# Patient Record
Sex: MALE | Race: BLACK OR AFRICAN AMERICAN | Employment: UNEMPLOYED | URBAN - METROPOLITAN AREA
[De-identification: names, ages, dates, MRNs, and addresses within clinical notes are randomized per-mention and may not be internally consistent; named-entity substitution may affect disease eponyms.]

---

## 2017-01-02 PROBLEM — E11.9 TYPE 2 DIABETES MELLITUS WITHOUT COMPLICATION, WITHOUT LONG-TERM CURRENT USE OF INSULIN (HCC): Status: ACTIVE | Noted: 2017-01-02

## 2017-06-19 ENCOUNTER — HOSPITAL ENCOUNTER (INPATIENT)
Age: 47
LOS: 2 days | Discharge: HOME | DRG: 247 | End: 2017-06-21
Attending: INTERNAL MEDICINE | Admitting: INTERNAL MEDICINE
Payer: MEDICARE

## 2017-06-19 DIAGNOSIS — E78.5 HYPERLIPIDEMIA LDL GOAL <70: ICD-10-CM

## 2017-06-19 DIAGNOSIS — I10 BENIGN ESSENTIAL HYPERTENSION: ICD-10-CM

## 2017-06-19 PROBLEM — I21.3 STEMI (ST ELEVATION MYOCARDIAL INFARCTION) (HCC): Status: ACTIVE | Noted: 2017-06-19

## 2017-06-19 LAB
ABO + RH BLD: NORMAL
ALBUMIN SERPL BCP-MCNC: 3.7 G/DL (ref 3.5–5)
ALBUMIN/GLOB SERPL: 1 {RATIO} (ref 1.2–3.5)
ALP SERPL-CCNC: 103 U/L (ref 50–136)
ALT SERPL-CCNC: 20 U/L (ref 12–65)
ANION GAP BLD CALC-SCNC: 11 MMOL/L (ref 7–16)
AST SERPL W P-5'-P-CCNC: 23 U/L (ref 15–37)
BACTERIA SPEC CULT: ABNORMAL
BACTERIA SPEC CULT: ABNORMAL
BASOPHILS # BLD AUTO: 0 K/UL (ref 0–0.2)
BASOPHILS # BLD AUTO: 0 K/UL (ref 0–0.2)
BASOPHILS # BLD: 0 % (ref 0–2)
BASOPHILS # BLD: 0 % (ref 0–2)
BILIRUB SERPL-MCNC: 0.6 MG/DL (ref 0.2–1.1)
BLOOD GROUP ANTIBODIES SERPL: NORMAL
BUN SERPL-MCNC: 16 MG/DL (ref 6–23)
CALCIUM SERPL-MCNC: 8.3 MG/DL (ref 8.3–10.4)
CHLORIDE SERPL-SCNC: 111 MMOL/L (ref 98–107)
CO2 SERPL-SCNC: 22 MMOL/L (ref 21–32)
COLLECTION COMMENT, COLCM: NORMAL
CREAT SERPL-MCNC: 1.12 MG/DL (ref 0.8–1.5)
DIFFERENTIAL METHOD BLD: ABNORMAL
DIFFERENTIAL METHOD BLD: ABNORMAL
EOSINOPHIL # BLD: 0 K/UL (ref 0–0.8)
EOSINOPHIL # BLD: 0 K/UL (ref 0–0.8)
EOSINOPHIL NFR BLD: 0 % (ref 0.5–7.8)
EOSINOPHIL NFR BLD: 1 % (ref 0.5–7.8)
ERYTHROCYTE [DISTWIDTH] IN BLOOD BY AUTOMATED COUNT: 14 % (ref 11.9–14.6)
ERYTHROCYTE [DISTWIDTH] IN BLOOD BY AUTOMATED COUNT: 14.1 % (ref 11.9–14.6)
ERYTHROCYTE [DISTWIDTH] IN BLOOD BY AUTOMATED COUNT: 14.1 % (ref 11.9–14.6)
GLOBULIN SER CALC-MCNC: 3.8 G/DL (ref 2.3–3.5)
GLUCOSE BLD STRIP.AUTO-MCNC: 102 MG/DL (ref 65–100)
GLUCOSE BLD STRIP.AUTO-MCNC: 111 MG/DL (ref 65–100)
GLUCOSE SERPL-MCNC: 92 MG/DL (ref 65–100)
HCT VFR BLD AUTO: 48.4 % (ref 41.1–50.3)
HCT VFR BLD AUTO: 48.7 % (ref 41.1–50.3)
HCT VFR BLD AUTO: 49.2 % (ref 41.1–50.3)
HGB BLD-MCNC: 16.6 G/DL (ref 13.6–17.2)
HGB BLD-MCNC: 16.8 G/DL (ref 13.6–17.2)
HGB BLD-MCNC: 17.1 G/DL (ref 13.6–17.2)
IMM GRANULOCYTES # BLD: 0 K/UL (ref 0–0.5)
IMM GRANULOCYTES NFR BLD AUTO: 0 % (ref 0–5)
IMM GRANULOCYTES NFR BLD AUTO: 0 % (ref 0–5)
LYMPHOCYTES # BLD AUTO: 10 % (ref 13–44)
LYMPHOCYTES # BLD AUTO: 11 % (ref 13–44)
LYMPHOCYTES # BLD: 1.3 K/UL (ref 0.5–4.6)
LYMPHOCYTES # BLD: 1.3 K/UL (ref 0.5–4.6)
MCH RBC QN AUTO: 30 PG (ref 26.1–32.9)
MCH RBC QN AUTO: 30 PG (ref 26.1–32.9)
MCH RBC QN AUTO: 30.1 PG (ref 26.1–32.9)
MCHC RBC AUTO-ENTMCNC: 34.3 G/DL (ref 31.4–35)
MCHC RBC AUTO-ENTMCNC: 34.5 G/DL (ref 31.4–35)
MCHC RBC AUTO-ENTMCNC: 34.8 G/DL (ref 31.4–35)
MCV RBC AUTO: 86.6 FL (ref 79.6–97.8)
MCV RBC AUTO: 87 FL (ref 79.6–97.8)
MCV RBC AUTO: 87.5 FL (ref 79.6–97.8)
MONOCYTES # BLD: 0.5 K/UL (ref 0.1–1.3)
MONOCYTES # BLD: 0.6 K/UL (ref 0.1–1.3)
MONOCYTES NFR BLD AUTO: 4 % (ref 4–12)
MONOCYTES NFR BLD AUTO: 5 % (ref 4–12)
NEUTS SEG # BLD: 10.2 K/UL (ref 1.7–8.2)
NEUTS SEG # BLD: 10.6 K/UL (ref 1.7–8.2)
NEUTS SEG NFR BLD AUTO: 84 % (ref 43–78)
NEUTS SEG NFR BLD AUTO: 85 % (ref 43–78)
PLATELET # BLD AUTO: 1 K/UL (ref 150–450)
PLATELET # BLD AUTO: 1 K/UL (ref 150–450)
PLATELET # BLD AUTO: 5 K/UL (ref 150–450)
PLATELET COMMENTS,PCOM: ABNORMAL
PLATELET COMMENTS,PCOM: ABNORMAL
PMV BLD AUTO: ABNORMAL FL (ref 10.8–14.1)
POTASSIUM SERPL-SCNC: 3.7 MMOL/L (ref 3.5–5.1)
PROT SERPL-MCNC: 7.5 G/DL (ref 6.3–8.2)
RBC # BLD AUTO: 5.53 M/UL (ref 4.23–5.67)
RBC # BLD AUTO: 5.6 M/UL (ref 4.23–5.67)
RBC # BLD AUTO: 5.68 M/UL (ref 4.23–5.67)
RBC MORPH BLD: ABNORMAL
RBC MORPH BLD: ABNORMAL
SERVICE CMNT-IMP: ABNORMAL
SODIUM SERPL-SCNC: 144 MMOL/L (ref 136–145)
SPECIMEN EXP DATE BLD: NORMAL
TROPONIN I SERPL-MCNC: 3.15 NG/ML (ref 0.02–0.05)
TROPONIN I SERPL-MCNC: 5.82 NG/ML (ref 0.02–0.05)
WBC # BLD AUTO: 12.1 K/UL (ref 4.3–11.1)
WBC # BLD AUTO: 12.4 K/UL (ref 4.3–11.1)
WBC # BLD AUTO: 12.5 K/UL (ref 4.3–11.1)
WBC MORPH BLD: ABNORMAL
WBC MORPH BLD: ABNORMAL

## 2017-06-19 PROCEDURE — 74011250637 HC RX REV CODE- 250/637: Performed by: INTERNAL MEDICINE

## 2017-06-19 PROCEDURE — 99152 MOD SED SAME PHYS/QHP 5/>YRS: CPT

## 2017-06-19 PROCEDURE — 74011250636 HC RX REV CODE- 250/636: Performed by: INTERNAL MEDICINE

## 2017-06-19 PROCEDURE — 74011250637 HC RX REV CODE- 250/637: Performed by: NURSE PRACTITIONER

## 2017-06-19 PROCEDURE — C1725 CATH, TRANSLUMIN NON-LASER: HCPCS

## 2017-06-19 PROCEDURE — 77030013131 HC IV BLD ST ICUM -A

## 2017-06-19 PROCEDURE — 77030019605

## 2017-06-19 PROCEDURE — 30233R1 TRANSFUSION OF NONAUTOLOGOUS PLATELETS INTO PERIPHERAL VEIN, PERCUTANEOUS APPROACH: ICD-10-PCS | Performed by: INTERNAL MEDICINE

## 2017-06-19 PROCEDURE — C1887 CATHETER, GUIDING: HCPCS

## 2017-06-19 PROCEDURE — B2111ZZ FLUOROSCOPY OF MULTIPLE CORONARY ARTERIES USING LOW OSMOLAR CONTRAST: ICD-10-PCS | Performed by: INTERNAL MEDICINE

## 2017-06-19 PROCEDURE — 77030004558 HC CATH ANGI DX SUPR TORQ CARD -A

## 2017-06-19 PROCEDURE — C1876 STENT, NON-COA/NON-COV W/DEL: HCPCS

## 2017-06-19 PROCEDURE — 36430 TRANSFUSION BLD/BLD COMPNT: CPT

## 2017-06-19 PROCEDURE — 99153 MOD SED SAME PHYS/QHP EA: CPT

## 2017-06-19 PROCEDURE — 77030012468 HC VLV BLEEDBK CNTRL ABBT -B

## 2017-06-19 PROCEDURE — 74011000258 HC RX REV CODE- 258: Performed by: INTERNAL MEDICINE

## 2017-06-19 PROCEDURE — 74011250636 HC RX REV CODE- 250/636

## 2017-06-19 PROCEDURE — 74011000250 HC RX REV CODE- 250: Performed by: INTERNAL MEDICINE

## 2017-06-19 PROCEDURE — 87641 MR-STAPH DNA AMP PROBE: CPT | Performed by: INTERNAL MEDICINE

## 2017-06-19 PROCEDURE — C1769 GUIDE WIRE: HCPCS

## 2017-06-19 PROCEDURE — 75810000275 HC EMERGENCY DEPT VISIT NO LEVEL OF CARE: Performed by: EMERGENCY MEDICINE

## 2017-06-19 PROCEDURE — 84484 ASSAY OF TROPONIN QUANT: CPT | Performed by: NURSE PRACTITIONER

## 2017-06-19 PROCEDURE — C1757 CATH, THROMBECTOMY/EMBOLECT: HCPCS

## 2017-06-19 PROCEDURE — P9035 PLATELET PHERES LEUKOREDUCED: HCPCS | Performed by: INTERNAL MEDICINE

## 2017-06-19 PROCEDURE — 65620000000 HC RM CCU GENERAL

## 2017-06-19 PROCEDURE — 86900 BLOOD TYPING SEROLOGIC ABO: CPT | Performed by: INTERNAL MEDICINE

## 2017-06-19 PROCEDURE — C1894 INTRO/SHEATH, NON-LASER: HCPCS

## 2017-06-19 PROCEDURE — 027136Z DILATION OF CORONARY ARTERY, TWO ARTERIES WITH THREE DRUG-ELUTING INTRALUMINAL DEVICES, PERCUTANEOUS APPROACH: ICD-10-PCS | Performed by: INTERNAL MEDICINE

## 2017-06-19 PROCEDURE — 77030002996 HC SUT SLK J&J -A

## 2017-06-19 PROCEDURE — 80053 COMPREHEN METABOLIC PANEL: CPT | Performed by: NURSE PRACTITIONER

## 2017-06-19 PROCEDURE — 85027 COMPLETE CBC AUTOMATED: CPT | Performed by: NURSE PRACTITIONER

## 2017-06-19 PROCEDURE — 92928 PRQ TCAT PLMT NTRAC ST 1 LES: CPT

## 2017-06-19 PROCEDURE — 85347 COAGULATION TIME ACTIVATED: CPT

## 2017-06-19 PROCEDURE — 82962 GLUCOSE BLOOD TEST: CPT

## 2017-06-19 PROCEDURE — B2151ZZ FLUOROSCOPY OF LEFT HEART USING LOW OSMOLAR CONTRAST: ICD-10-PCS | Performed by: INTERNAL MEDICINE

## 2017-06-19 PROCEDURE — 77030013687 HC GD NDL BARD -B

## 2017-06-19 PROCEDURE — 36415 COLL VENOUS BLD VENIPUNCTURE: CPT | Performed by: NURSE PRACTITIONER

## 2017-06-19 PROCEDURE — 76937 US GUIDE VASCULAR ACCESS: CPT

## 2017-06-19 PROCEDURE — 93458 L HRT ARTERY/VENTRICLE ANGIO: CPT

## 2017-06-19 PROCEDURE — 4A023N7 MEASUREMENT OF CARDIAC SAMPLING AND PRESSURE, LEFT HEART, PERCUTANEOUS APPROACH: ICD-10-PCS | Performed by: INTERNAL MEDICINE

## 2017-06-19 PROCEDURE — 77030004559 HC CATH ANGI DX SUPT CARD -B

## 2017-06-19 PROCEDURE — 74011636320 HC RX REV CODE- 636/320: Performed by: INTERNAL MEDICINE

## 2017-06-19 PROCEDURE — C1760 CLOSURE DEV, VASC: HCPCS

## 2017-06-19 PROCEDURE — 85025 COMPLETE CBC W/AUTO DIFF WBC: CPT | Performed by: INTERNAL MEDICINE

## 2017-06-19 PROCEDURE — 92941 PRQ TRLML REVSC TOT OCCL AMI: CPT

## 2017-06-19 RX ORDER — FENTANYL CITRATE 50 UG/ML
25-75 INJECTION, SOLUTION INTRAMUSCULAR; INTRAVENOUS
Status: DISCONTINUED | OUTPATIENT
Start: 2017-06-19 | End: 2017-06-20 | Stop reason: ALTCHOICE

## 2017-06-19 RX ORDER — ATROPINE SULFATE 0.1 MG/ML
0.5 INJECTION INTRAVENOUS AS NEEDED
Status: DISCONTINUED | OUTPATIENT
Start: 2017-06-19 | End: 2017-06-21 | Stop reason: HOSPADM

## 2017-06-19 RX ORDER — MORPHINE SULFATE 2 MG/ML
2 INJECTION, SOLUTION INTRAMUSCULAR; INTRAVENOUS
Status: DISCONTINUED | OUTPATIENT
Start: 2017-06-19 | End: 2017-06-21 | Stop reason: HOSPADM

## 2017-06-19 RX ORDER — NITROGLYCERIN 0.4 MG/1
0.4 TABLET SUBLINGUAL
Status: DISCONTINUED | OUTPATIENT
Start: 2017-06-19 | End: 2017-06-21 | Stop reason: HOSPADM

## 2017-06-19 RX ORDER — SODIUM CHLORIDE 9 MG/ML
75 INJECTION, SOLUTION INTRAVENOUS CONTINUOUS
Status: DISCONTINUED | OUTPATIENT
Start: 2017-06-19 | End: 2017-06-20 | Stop reason: ALTCHOICE

## 2017-06-19 RX ORDER — MORPHINE SULFATE 10 MG/ML
1-5 INJECTION, SOLUTION INTRAMUSCULAR; INTRAVENOUS
Status: DISCONTINUED | OUTPATIENT
Start: 2017-06-19 | End: 2017-06-20 | Stop reason: ALTCHOICE

## 2017-06-19 RX ORDER — LORATADINE 10 MG/1
10 TABLET ORAL
Status: DISCONTINUED | OUTPATIENT
Start: 2017-06-19 | End: 2017-06-21 | Stop reason: HOSPADM

## 2017-06-19 RX ORDER — SODIUM CHLORIDE 0.9 % (FLUSH) 0.9 %
5-10 SYRINGE (ML) INJECTION AS NEEDED
Status: DISCONTINUED | OUTPATIENT
Start: 2017-06-19 | End: 2017-06-21 | Stop reason: HOSPADM

## 2017-06-19 RX ORDER — SODIUM CHLORIDE 0.9 % (FLUSH) 0.9 %
5-10 SYRINGE (ML) INJECTION EVERY 8 HOURS
Status: DISCONTINUED | OUTPATIENT
Start: 2017-06-19 | End: 2017-06-21 | Stop reason: HOSPADM

## 2017-06-19 RX ORDER — GUAIFENESIN 100 MG/5ML
324 LIQUID (ML) ORAL ONCE
Status: ACTIVE | OUTPATIENT
Start: 2017-06-19 | End: 2017-06-20

## 2017-06-19 RX ORDER — INSULIN LISPRO 100 [IU]/ML
INJECTION, SOLUTION INTRAVENOUS; SUBCUTANEOUS
Status: DISCONTINUED | OUTPATIENT
Start: 2017-06-19 | End: 2017-06-21 | Stop reason: HOSPADM

## 2017-06-19 RX ORDER — SODIUM CHLORIDE 9 MG/ML
250 INJECTION, SOLUTION INTRAVENOUS AS NEEDED
Status: DISCONTINUED | OUTPATIENT
Start: 2017-06-19 | End: 2017-06-21 | Stop reason: HOSPADM

## 2017-06-19 RX ORDER — GABAPENTIN 300 MG/1
300 CAPSULE ORAL
Status: DISCONTINUED | OUTPATIENT
Start: 2017-06-19 | End: 2017-06-21 | Stop reason: HOSPADM

## 2017-06-19 RX ORDER — GUAIFENESIN 100 MG/5ML
81 LIQUID (ML) ORAL DAILY
Status: DISCONTINUED | OUTPATIENT
Start: 2017-06-20 | End: 2017-06-21 | Stop reason: HOSPADM

## 2017-06-19 RX ORDER — LIDOCAINE HYDROCHLORIDE 20 MG/ML
2-10 INJECTION, SOLUTION INFILTRATION; PERINEURAL
Status: DISCONTINUED | OUTPATIENT
Start: 2017-06-19 | End: 2017-06-20 | Stop reason: ALTCHOICE

## 2017-06-19 RX ORDER — ONDANSETRON 2 MG/ML
4 INJECTION INTRAMUSCULAR; INTRAVENOUS AS NEEDED
Status: DISCONTINUED | OUTPATIENT
Start: 2017-06-19 | End: 2017-06-21 | Stop reason: HOSPADM

## 2017-06-19 RX ORDER — HEPARIN SODIUM 200 [USP'U]/100ML
3 INJECTION, SOLUTION INTRAVENOUS CONTINUOUS
Status: DISCONTINUED | OUTPATIENT
Start: 2017-06-19 | End: 2017-06-20 | Stop reason: ALTCHOICE

## 2017-06-19 RX ORDER — ATORVASTATIN CALCIUM 40 MG/1
80 TABLET, FILM COATED ORAL DAILY
Status: DISCONTINUED | OUTPATIENT
Start: 2017-06-20 | End: 2017-06-21 | Stop reason: HOSPADM

## 2017-06-19 RX ORDER — HEPARIN SODIUM 10000 [USP'U]/ML
1000-5000 INJECTION, SOLUTION INTRAVENOUS; SUBCUTANEOUS
Status: DISCONTINUED | OUTPATIENT
Start: 2017-06-19 | End: 2017-06-20 | Stop reason: ALTCHOICE

## 2017-06-19 RX ORDER — MIDAZOLAM HYDROCHLORIDE 1 MG/ML
.5-2 INJECTION, SOLUTION INTRAMUSCULAR; INTRAVENOUS
Status: DISCONTINUED | OUTPATIENT
Start: 2017-06-19 | End: 2017-06-20 | Stop reason: ALTCHOICE

## 2017-06-19 RX ORDER — ACETAMINOPHEN 325 MG/1
650 TABLET ORAL
Status: DISCONTINUED | OUTPATIENT
Start: 2017-06-19 | End: 2017-06-21 | Stop reason: HOSPADM

## 2017-06-19 RX ORDER — LOSARTAN POTASSIUM 50 MG/1
50 TABLET ORAL DAILY
Status: DISCONTINUED | OUTPATIENT
Start: 2017-06-20 | End: 2017-06-19

## 2017-06-19 RX ORDER — CARVEDILOL 25 MG/1
25 TABLET ORAL 2 TIMES DAILY WITH MEALS
Status: DISCONTINUED | OUTPATIENT
Start: 2017-06-19 | End: 2017-06-19

## 2017-06-19 RX ORDER — PRASUGREL 10 MG/1
60 TABLET, FILM COATED ORAL ONCE
Status: COMPLETED | OUTPATIENT
Start: 2017-06-19 | End: 2017-06-19

## 2017-06-19 RX ADMIN — HEPARIN SODIUM 3 UNITS/HR: 200 INJECTION, SOLUTION INTRAVENOUS at 13:22

## 2017-06-19 RX ADMIN — PRASUGREL HYDROCHLORIDE 60 MG: 10 TABLET, FILM COATED ORAL at 14:43

## 2017-06-19 RX ADMIN — LORATADINE 10 MG: 10 TABLET ORAL at 22:58

## 2017-06-19 RX ADMIN — ATROPINE SULFATE 0.5 MG: 0.1 INJECTION, SOLUTION INTRAVENOUS at 13:45

## 2017-06-19 RX ADMIN — ONDANSETRON 4 MG: 2 INJECTION INTRAMUSCULAR; INTRAVENOUS at 13:43

## 2017-06-19 RX ADMIN — MIDAZOLAM HYDROCHLORIDE 2 MG: 1 INJECTION, SOLUTION INTRAMUSCULAR; INTRAVENOUS at 13:27

## 2017-06-19 RX ADMIN — IOPAMIDOL 250 ML: 755 INJECTION, SOLUTION INTRAVENOUS at 14:45

## 2017-06-19 RX ADMIN — FENTANYL CITRATE 25 MCG: 50 INJECTION, SOLUTION INTRAMUSCULAR; INTRAVENOUS at 13:27

## 2017-06-19 RX ADMIN — GABAPENTIN 300 MG: 300 CAPSULE ORAL at 22:58

## 2017-06-19 RX ADMIN — HEPARIN SODIUM 3000 UNITS: 10000 INJECTION, SOLUTION INTRAVENOUS; SUBCUTANEOUS at 13:28

## 2017-06-19 RX ADMIN — HEPARIN SODIUM 2000 UNITS: 10000 INJECTION, SOLUTION INTRAVENOUS; SUBCUTANEOUS at 13:43

## 2017-06-19 RX ADMIN — Medication 10 ML: at 22:58

## 2017-06-19 RX ADMIN — MIDAZOLAM HYDROCHLORIDE 2 MG: 1 INJECTION, SOLUTION INTRAMUSCULAR; INTRAVENOUS at 14:42

## 2017-06-19 RX ADMIN — HEPARIN SODIUM 3000 UNITS: 10000 INJECTION, SOLUTION INTRAVENOUS; SUBCUTANEOUS at 14:01

## 2017-06-19 RX ADMIN — ABCIXIMAB 26 MG: 2 INJECTION, SOLUTION INTRAVENOUS at 13:43

## 2017-06-19 RX ADMIN — LIDOCAINE HYDROCHLORIDE 140 MG: 20 INJECTION, SOLUTION INFILTRATION; PERINEURAL at 13:30

## 2017-06-19 RX ADMIN — SODIUM CHLORIDE 75 ML/HR: 900 INJECTION, SOLUTION INTRAVENOUS at 19:27

## 2017-06-19 RX ADMIN — MORPHINE SULFATE 2 MG: 10 INJECTION INTRAMUSCULAR; INTRAVENOUS; SUBCUTANEOUS at 17:08

## 2017-06-19 RX ADMIN — DEXTROSE 0.12 MCG/KG/MIN: 5 SOLUTION INTRAVENOUS at 13:49

## 2017-06-19 RX ADMIN — CARVEDILOL 25 MG: 25 TABLET, FILM COATED ORAL at 17:17

## 2017-06-19 RX ADMIN — MORPHINE SULFATE 2 MG: 10 INJECTION INTRAMUSCULAR; INTRAVENOUS; SUBCUTANEOUS at 14:17

## 2017-06-19 RX ADMIN — MORPHINE SULFATE 2 MG: 10 INJECTION INTRAMUSCULAR; INTRAVENOUS; SUBCUTANEOUS at 14:41

## 2017-06-19 RX ADMIN — PROMETHAZINE HYDROCHLORIDE 25 MG: 25 INJECTION, SOLUTION INTRAMUSCULAR; INTRAVENOUS at 19:27

## 2017-06-19 RX ADMIN — MORPHINE SULFATE 2 MG: 10 INJECTION INTRAMUSCULAR; INTRAVENOUS; SUBCUTANEOUS at 14:07

## 2017-06-19 RX ADMIN — Medication 10 ML: at 14:00

## 2017-06-19 RX ADMIN — ONDANSETRON 4 MG: 2 INJECTION INTRAMUSCULAR; INTRAVENOUS at 18:56
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
## 2017-06-19 NOTE — PROGRESS NOTES
Came in to assess pt post cath site and found the patient to have blood saturated on the bobby. Dressing removed and manual pressure held until hemostasis achieved. Site redressed and sandbag placed to reinforce pressure.   Will continue to monitor

## 2017-06-19 NOTE — IP AVS SNAPSHOT
Jeremías Montilla 
 
 
 [address] [facility] 
[phone] Patient: Gianluca Velasquez. MRN: [id number] You are allergic to the following Allergen Reactions Lisinopril Swelling Ragweed Not Reported This Time Reopro (Abciximab) Other (comments) Thrombocytopenia Recent Documentation Height Weight BMI Smoking Status 1.905 m 105.9 kg 29.19 kg/m2 Current Every Day Smoker Unresulted Labs Order Current Status HIT PROFILE In process Emergency Contacts Name Discharge Info Relation Home Work Mobile Karlee Fox  Mother [14] [phone] Nancy Poole  Other Relative [6] [phone] About your hospitalization You were admitted on:  June 19, 2017 You last received care in the:  [facility] 3 TELEMETRY You were discharged on:  June 21, 2017 Unit phone number:  [phone] Why you were hospitalized Your primary diagnosis was:  Stemi (St Elevation Myocardial Infarction) (Hcc) Your diagnoses also included:  Benign Essential Hypertension, Diabetes (Hcc), Hyperlipidemia Ldl Goal <70, Thrombocytopenia (Hcc), Gastrointestinal Hemorrhage With Hematemesis Providers Seen During Your Hospitalizations Provider Role Specialty Primary office phone Aubrey Walters MD Attending Provider Cardiology [phone] Your Primary Care Physician (PCP) Primary Care Physician Office Phone Office Fax [facility] [phone] Follow-up Information Follow up With Details Comments Contact Info Rosi Kennedy MD   [facility] [id number] [phone] Anamaria Wallace MD On 6/26/2017 Follow up on June 26th at 10:00am [address] [place] [address]) [address] [place] [address] [address] Cardiology  A [facility] [address] 
[phone] Your Appointments  Monday June 26, 2017 10:00 AM EDT  
 TRANSITIONAL CARE MANAGEMENT with Mable Womack MD  
[facility] Cardiology ([address]) [address]  
[address] Uday Montero [address]  
[phone] Current Discharge Medication List  
  
START taking these medications Dose & Instructions Dispensing Information Comments Morning Noon Evening Bedtime  
 aspirin 81 mg chewable tablet Your next dose is:  Thursday morning Dose:  81 mg Take 1 Tab by mouth daily. Refills:  0  
     
   
   
   
  
 nitroglycerin 0.4 mg SL tablet Commonly known as:  NITROSTAT Notes to Patient:  As needed for chest pain and chest pressure Dose:  0.4 mg  
1 Tab by SubLINGual route every five (5) minutes as needed for Chest Pain. Quantity:  2 Bottle Refills:  4  
     
   
   
   
  
 pantoprazole 40 mg tablet Commonly known as:  PROTONIX Your next dose is: Tonight at dinner Dose:  40 mg Take 1 Tab by mouth Before breakfast and dinner. Quantity:  60 Tab Refills:  6  
     
   
   
  
   
  
 prasugrel 10 mg tablet Commonly known as:  EFFIENT Your next dose is:  Thursday morning Dose:  10 mg Take 1 Tab by mouth daily. Quantity:  30 Tab Refills:  6 CONTINUE these medications which have CHANGED Dose & Instructions Dispensing Information Comments Morning Noon Evening Bedtime  
 atorvastatin 80 mg tablet Commonly known as:  LIPITOR What changed:   
- medication strength 
- how much to take Your next dose is:  Thursday morning Dose:  80 mg Take 1 Tab by mouth daily. Quantity:  30 Tab Refills:  11  
     
   
   
   
  
 carvedilol 3.125 mg tablet Commonly known as:  Chato Pulse What changed:   
- medication strength 
- how much to take Your next dose is: Today with dinner Dose:  3.125 mg Take 1 Tab by mouth two (2) times daily (with meals). Quantity:  60 Tab Refills:  3 CONTINUE these medications which have NOT CHANGED Dose & Instructions Dispensing Information Comments Morning Noon Evening Bedtime  
 canagliflozin 300 mg tablet Commonly known as:  Tanner [place] Your next dose is:  Thursday morning Dose:  300 mg Take 1 Tab by mouth Daily (before breakfast). Quantity:  30 Tab Refills:  3  
     
   
   
   
  
 cetirizine 10 mg tablet Commonly known as:  ZYRTEC Notes to Patient: Tonight before bed Dose:  10 mg Take 1 Tab by mouth nightly. Quantity:  30 Tab Refills:  0  
     
   
   
   
  
  
 furosemide 80 mg tablet Commonly known as:  LASIX Your next dose is:  Thursday morning Dose:  80 mg Take 1 Tab by mouth daily. Quantity:  30 Tab Refills:  3  
     
   
   
   
  
 gabapentin 300 mg capsule Commonly known as:  NEURONTIN Your next dose is: Tonight before bed Dose:  300 mg Take 1 Cap by mouth nightly. Quantity:  30 Cap Refills:  1 SITagliptin-metFORMIN 100-1,000 mg Tm24 Commonly known as:  JANUMET XR Your next dose is:  Thursday morning Dose:  1 Tab Take 1 Tab by mouth daily. Quantity:  30 Tab Refills:  3 ZANTAC 150 mg tablet Generic drug:  raNITIdine Your next dose is: Today before dinner Dose:  150 mg Take 1 Tab by mouth two (2) times a day. Refills:  0 STOP taking these medications   
 amLODIPine 10 mg tablet Commonly known as:  Emily Valentin Notes to Patient:  STOP TAKING THIS MEDICATION ! ! !  
   
  
 losartan 50 mg tablet Commonly known as:  COZAAR Notes to Patient:  STOP TAKING THIS MEDICATION ! ! !  
   
  
  
  
Where to Get Your Medications Information on where to get these meds will be given to you by the nurse or doctor. ! Ask your nurse or doctor about these medications  
  atorvastatin 80 mg tablet  
 carvedilol 3.125 mg tablet nitroglycerin 0.4 mg SL tablet  
 pantoprazole 40 mg tablet  
 prasugrel 10 mg tablet Discharge Instructions Percutaneous Coronary Intervention: What to Expect at [facility] Your Recovery Percutaneous coronary intervention (PCI) is the name for procedures that are used to open a narrowed or blocked coronary artery. The two most common PCI procedures are coronary angioplasty and coronary stent placement. Your groin or arm may have a bruise and feel sore for a day or two after a percutaneous coronary intervention (PCI). You can do light activities around the house, but nothing strenuous for several days. This care sheet gives you a general idea about how long it will take for you to recover. But each person recovers at a different pace. Follow the steps below to get better as quickly as possible. How can you care for yourself at home? Activity · If the doctor gave you a sedative: ¨ For 24 hours, don't do anything that requires attention to detail. It takes time for the medicine's effects to completely wear off. ¨ For your safety, do not drive or operate any machinery that could be dangerous. Wait until the medicine wears off and you can think clearly and react easily. · Do not do strenuous exercise and do not lift, pull, or push anything heavy until your doctor says it is okay. This may be for a day or two. You can walk around the house and do light activity, such as cooking. · If the catheter was placed in your groin, try not to walk up stairs for the first couple of days. · If the catheter was placed in your arm near your wrist, do not bend your wrist deeply for the first couple of days. Be careful using your hand to get into and out of a chair or bed. · Carry your stent identification card with you at all times. · If your doctor recommends it, get more exercise. Walking is a good choice. Bit by bit, increase the amount you walk every day.  Try for at least 30 minutes on most days of the week. Diet · Drink plenty of fluids to help your body flush out the dye. If you have kidney, heart, or liver disease and have to limit fluids, talk with your doctor before you increase the amount of fluids you drink. · Keep eating a heart-healthy diet that has lots of fruits, vegetables, and whole grains. If you have not been eating this way, talk to your doctor. You also may want to talk to a dietitian. This expert can help you to learn about healthy foods and plan meals. Medicines · Your doctor will tell you if and when you can restart your medicines. He or she will also give you instructions about taking any new medicines. · If you take blood thinners, such as warfarin (Coumadin), clopidogrel (Plavix), or aspirin, be sure to talk to your doctor. He or she will tell you if and when to start taking those medicines again. Make sure that you understand exactly what your doctor wants you to do. · Your doctor will prescribe blood-thinning medicines. You will likely take aspirin plus another antiplatelet, such as clopidogrel (Plavix). It is very important that you take these medicines exactly as directed. These medicines help keep the coronary artery open and reduce your risk of a heart attack. · Call your doctor if you think you are having a problem with your medicine. Care of the catheter site · For 1 or 2 days, keep a bandage over the spot where the catheter was inserted. The bandage probably will fall off in this time. · Put ice or a cold pack on the area for 10 to 20 minutes at a time to help with soreness or swelling. Put a thin cloth between the ice and your skin. · You may shower 24 to 48 hours after the procedure, if your doctor okays it. Pat the incision dry. · Do not soak the catheter site until it is healed. Don't take a bath for 1 week, or until your doctor tells you it is okay. Follow-up care is a key part of your treatment and safety.  Be sure to make and go to all appointments, and call your doctor if you are having problems. It's also a good idea to know your test results and keep a list of the medicines you take. When should you call for help? Call 911 anytime you think you may need emergency care. For example, call if: 
· You passed out (lost consciousness). · You have severe trouble breathing. · You have sudden chest pain and shortness of breath, or you cough up blood. · You have symptoms of a heart attack, such as: ¨ Chest pain or pressure. ¨ Sweating. ¨ Shortness of breath. ¨ Nausea or vomiting. ¨ Pain that spreads from the chest to the neck, jaw, or one or both shoulders or arms. ¨ Dizziness or lightheadedness. ¨ A fast or uneven pulse. After calling 911, chew 1 adult-strength aspirin. Wait for an ambulance. Do not try to drive yourself. · You have been diagnosed with angina, and you have angina symptoms that do not go away with rest or are not getting better within 5 minutes after you take one dose of nitroglycerin. Call your doctor now or seek immediate medical care if: 
· You are bleeding from the area where the catheter was put in your artery. · You have a fast-growing, painful lump at the catheter site. · You have signs of infection, such as: 
¨ Increased pain, swelling, warmth, or redness. ¨ Red streaks leading from the catheter site. ¨ Pus draining from the catheter site. ¨ A fever. · Your leg or arm looks blue or feels cold, numb, or tingly. Watch closely for changes in your health, and be sure to contact your doctor if you have any problems. Where can you learn more? Go to http://delroy-bety.info/. Enter C549 in the search box to learn more about \"Percutaneous Coronary Intervention: What to Expect at Home. \" Current as of: January 13, 2017 Content Version: 11.3 © 1078-7347 [employer].  Care instructions adapted under license by [address] (which disclaims liability or warranty for this information). If you have questions about a medical condition or this instruction, always ask your healthcare professional. [address] any warranty or liability for your use of this information. Heart Attack: Care Instructions Your Care Instructions A heart attack (myocardial infarction, or MI) occurs when one or more of the coronary arteries, which supply the heart with oxygen-rich blood, is blocked. A blockage usually occurs when plaque inside the artery breaks open and a blood clot forms in the artery. After a heart attack, you may be worried about your future. Over the next several weeks, your heart will start to heal. Though it can be hard to break old habits, you can prevent another heart attack by making some lifestyle changes and by taking medicines. You may use this information for ideas about what to do at home to speed your recovery. Follow-up care is a key part of your treatment and safety. Be sure to make and go to all appointments, and call your doctor if you are having problems. It's also a good idea to know your test results and keep a list of the medicines you take. How can you care for yourself at home? Activity · Until your doctor says it is okay, do not do strenuous exercise. And do not lift, pull, or push anything heavy. Ask your doctor what types of activities are safe for you. · If your doctor has not set you up with a cardiac rehabilitation (rehab) program, talk to him or her about whether that is right for you. Cardiac rehab includes supervised exercise. It also includes help with diet and lifestyle changes and emotional support. It may reduce your risk of future heart problems. · Increase your activities slowly. Take short rest breaks when you get tired. · If your doctor recommends it, get more exercise.  Walking is a good choice. Bit by bit, increase the amount you walk every day. Try for at least 30 minutes on most days of the week. You also may want to swim, bike, or do other activities. Talk with your doctor before you start an exercise program to make sure it is safe for you. · Ask your doctor when you can drive, go back to work, and do other daily activities again. · You can have sex as soon as you feel ready for it. Often this means when you can easily walk around or climb stairs. Talk with your doctor if you have any concerns. If you are taking nitroglycerin, do not take erection-enhancing medicine such as sildenafil (Viagra), tadalafil (Cialis), or vardenafil (Levitra) . Lifestyle changes · Do not smoke. Smoking increases your risk of another heart attack. If you need help quitting, talk to your doctor about stop-smoking programs and medicines. These can increase your chances of quitting for good. · Eat a heart-healthy diet that is low in saturated fat and salt, and is full of fruits, vegetables and whole-grains. Eat at least two servings of fish each week. You may get more details about how to eat healthy. But these tips can help you get started. · Stay at a healthy weight, or lose weight if you need to. Medicines · Be safe with medicines. Take your medicines exactly as prescribed. Call your doctor if you think you are having a problem with your medicine. You will get more details on the specific medicines your doctor prescribes. Do not stop taking your medicine unless your doctor tells you to. Not taking your medicine might raise your risk of having another heart attack. · You may need several medicines to help lower your risk of another heart attack. These include: ¨ Blood pressure medicines such as angiotensin-converting enzyme (ACE) inhibitors, ARBs (angiotensin II receptor blockers), and beta-blockers. ¨ Cholesterol medicine called statins. ¨ Aspirin and other blood thinners. These prevent blood clots that can cause a heart attack. · If your doctor has given you nitroglycerin, keep it with you at all times. If you have angina symptoms, such as chest pain or pressure, sit down and rest. Take the first dose of nitroglycerin as directed. If symptoms get worse or are not getting better within 5 minutes, call 911 right away. Stay on the phone. The emergency  will tell you what to do. · Do not take any over-the-counter medicines, vitamins, or herbal products without talking to your doctor first. 
Staying healthy · Manage other health conditions such as high blood pressure and diabetes. · Avoid colds and flu. Get a pneumococcal vaccine shot. If you have had one before, ask your doctor whether you need another dose. Get the flu vaccine every year. If you must be around people with colds or flu, wash your hands often. · Be sure to tell your doctor about any angina symptoms you have had, even if they went away. Pay attention to your angina symptoms. Know what is typical for you and learn how to control it. Know when to call for help. · Talk to your family, friends, or a counselor about your feelings. It is normal to feel frightened, angry, hopeless, helpless, and even guilty. Talking openly about bad feelings can help you cope. If you have symptoms of depression, talk to your doctor. When should you call for help? Call 911 anytime you think you may need emergency care. For example, call if: 
· You have symptoms of a heart attack. These may include: ¨ Chest pain or pressure, or a strange feeling in the chest. 
¨ Sweating. ¨ Shortness of breath. ¨ Nausea or vomiting. ¨ Pain, pressure, or a strange feeling in the back, neck, jaw, or upper belly or in one or both shoulders or arms. ¨ Lightheadedness or sudden weakness. ¨ A fast or irregular heartbeat.  
After you call 911, the  may tell you to chew 1 adult-strength or 2 to 4 low-dose aspirin. Wait for an ambulance. Do not try to drive yourself. · You have angina symptoms (such as chest pain or pressure) that do not go away with rest or are not getting better within 5 minutes after you take a dose of nitroglycerin. · You passed out (lost consciousness). · You feel like you are having another heart attack. Call your doctor now or seek immediate medical care if: 
· You are having angina symptoms, such as chest pain or pressure, more often than usual, or the symptoms are different or worse than usual. 
· You have new or increased shortness of breath. · You are dizzy or lightheaded, or you feel like you may faint. Watch closely for changes in your health, and be sure to contact your doctor if you have any problems. Where can you learn more? Go to http://delroy-bety.info/. Enter [id number] in the search box to learn more about \"Heart Attack: Care Instructions. \" Current as of: August 8, 2016 Content Version: 11.3 © 1711-7680 [employer]. Care instructions adapted under license by [employer] (which disclaims liability or warranty for this information). If you have questions about a medical condition or this instruction, always ask your healthcare professional. [address] any warranty or liability for your use of this information. Reducing Heart Attack Risk With Daily Medicine: Care Instructions Your Care Instructions Heart disease is the number one cause of death. If you are at risk for heart disease, there are many medicines that can reduce your risk. These include: · ACE inhibitors. These are a type of blood pressure medicine. They can reduce the risk of heart attacks and strokes if you are at high risk. · Statin medicines. These lower cholesterol. They can also reduce the risk of heart disease and strokes. · Aspirin. It can help certain people lower their risk of a heart attack or stroke. · Beta-blocker medicines. These are a type of blood pressure and heart medicine. They can reduce the chance of early death if you have had a heart attack. All medicines can cause side effects. So it is important to understand the pros and cons of any medicine you take. It is also important to take your medicines exactly as your doctor tells you to. Follow-up care is a key part of your treatment and safety. Be sure to make and go to all appointments, and call your doctor if you are having problems. It's also a good idea to know your test results and keep a list of the medicines you take. ACE inhibitors ACE (angiotensin-converting enzyme) inhibitors are used for three main reasons. They lower blood pressure, protect the kidneys, and prevent heart attacks and strokes. Examples include benazepril (Lotensin), lisinopril (Prinivil, Zestril), and ramipril (Altace). Before you start taking an ACE inhibitor, make sure your doctor knows if: 
· You are taking a water pill (diuretic). · You are taking potassium pills or using salt substitutes. · You are pregnant or breastfeeding. · You have had a kidney transplant or other kidney problems. ACE inhibitors can cause side effects. Call your doctor right away if you have: · Trouble breathing. · Swelling in your face, head, neck, or tongue. · Dizziness or lightheadedness. · A dry cough. Statins Statins lower cholesterol. Examples include atorvastatin (Lipitor), lovastatin (Mevacor), pravastatin (Pravachol), and simvastatin (Zocor). Before you start taking a statin, make sure your doctor knows if: 
· You have had a kidney transplant or other kidney problems. · You have liver disease. · You take any other prescription medicine, over-the-counter medicine, vitamins, supplements, or herbal remedies. · You are pregnant or breastfeeding. Statins can cause side effects. Call your doctor right away if you have: · New, severe muscle aches. · Brown urine. Aspirin Taking an aspirin every day can lower your risk for a heart attack. A heart attack occurs when a blood vessel in the heart gets blocked. When this happens, oxygen can't get to the heart muscle, and part of the heart dies. Aspirin can help prevent blood clots that can block the blood vessels. Talk to your doctor before you start taking aspirin every day. He or she may recommend that you take one low-dose aspirin (81 mg) tablet each day, with a meal and a full glass of water. Taking aspirin isn't right for everyone, because it can cause serious bleeding. And you may not be able to use aspirin if you: 
· Have asthma. · Have an ulcer or other stomach problem. · Take some other medicine (called a blood thinner) that prevents blood clots. · Are allergic to aspirin. Before having a surgery or procedure, tell your doctor or dentist that you take aspirin. He or she will tell you if you should stop taking aspirin beforehand. Make sure that you understand exactly what your doctor wants you to do. Aspirin can cause side effects. Call your doctor right away if you have: · Unusual bleeding or bruising. · Nausea, vomiting, or heartburn. · Black or bloody stools. Beta-blockers Beta-blockers are used for three main reasons. They lower blood pressure, relieve angina symptoms (such as chest pain or pressure), and reduce the chances of a second heart attack. They include atenolol (Tenormin), carvedilol (Coreg), and metoprolol (Lopressor). Before you start taking a beta-blocker, make sure your doctor knows if you have: · Severe asthma or frequent asthma attacks. · A very slow pulse (less than 55 beats a minute). Beta-blockers can cause side effects. Call your doctor right away if you have: · Wheezing or trouble breathing. · Dizziness or lightheadedness. · Asthma that gets worse. When should you call for help? Call 911 anytime you think you may need emergency care. For example, call if: · You passed out (lost consciousness). Call your doctor now or seek immediate medical care if: 
· You are wheezing or have trouble breathing. · You have swelling in your face, head, neck, or tongue. · You are dizzy or lightheaded, or you feel like you may faint. · You have severe muscle pain, weakness, or brown urine. · You have vision problems. · You have new bruises or blood spots under your skin. · Your stools are black and tarlike or have streaks of blood. Watch closely for changes in your health, and be sure to contact your doctor if: 
· You have ringing in your ears. · You feel very tired. · You have gas, constipation, or an upset stomach. Where can you learn more? Go to http://delroy-bety.info/. Enter R428 in the search box to learn more about \"Reducing Heart Attack Risk With Daily Medicine: Care Instructions. \" Current as of: September 21, 2016 Content Version: 11.3 © 4370-4659 [employer]. Care instructions adapted under license by [employer] (which disclaims liability or warranty for this information). If you have questions about a medical condition or this instruction, always ask your healthcare professional. [address] any warranty or liability for your use of this information. Heart-Healthy Diet: Care Instructions Your Care Instructions A heart-healthy diet has lots of vegetables, fruits, nuts, beans, and whole grains, and is low in salt. It limits foods that are high in saturated fat, such as meats, cheeses, and fried foods. It may be hard to change your diet, but even small changes can lower your risk of heart attack and heart disease. Follow-up care is a key part of your treatment and safety. Be sure to make and go to all appointments, and call your doctor if you are having problems. It's also a good idea to know your test results and keep a list of the medicines you take. How can you care for yourself at home? Watch your portions · Learn what a serving is. A \"serving\" and a \"portion\" are not always the same thing. Make sure that you are not eating larger portions than are recommended. For example, a serving of pasta is ½ cup. A serving size of meat is 2 to 3 ounces. A 3-ounce serving is about the size of a deck of cards. Measure serving sizes until you are good at [place]" them. Keep in mind that restaurants often serve portions that are 2 or 3 times the size of one serving. · To keep your energy level up and keep you from feeling hungry, eat often but in smaller portions. · Eat only the number of calories you need to stay at a healthy weight. If you need to lose weight, eat fewer calories than your body burns (through exercise and other physical activity). Eat more fruits and vegetables · Eat a variety of fruit and vegetables every day. Dark green, deep orange, red, or yellow fruits and vegetables are especially good for you. Examples include spinach, carrots, peaches, and berries. · Keep carrots, celery, and other veggies handy for snacks. Buy fruit that is in season and store it where you can see it so that you will be tempted to eat it. · Cook dishes that have a lot of veggies in them, such as stir-fries and soups. Limit saturated and trans fat · Read food labels, and try to avoid saturated and trans fats. They increase your risk of heart disease. Trans fat is found in many processed foods such as cookies and crackers. · Use olive or canola oil when you cook. Try cholesterol-lowering spreads, such as Benecol or Take Control. · Bake, broil, grill, or steam foods instead of frying them. · Choose lean meats instead of high-fat meats such as hot dogs and sausages. Cut off all visible fat when you prepare meat. · Eat fish, skinless poultry, and meat alternatives such as soy products instead of high-fat meats.  Soy products, such as tofu, may be especially good for your heart. · Choose low-fat or fat-free milk and dairy products. Eat fish · Eat at least two servings of fish a week. Certain fish, such as salmon and tuna, contain omega-3 fatty acids, which may help reduce your risk of heart attack. Eat foods high in fiber · Eat a variety of grain products every day. Include whole-grain foods that have lots of fiber and nutrients. Examples of whole-grain foods include oats, whole wheat bread, and brown rice. · Buy whole-grain breads and cereals, instead of white bread or pastries. Limit salt and sodium · Limit how much salt and sodium you eat to help lower your blood pressure. · Taste food before you salt it. Add only a little salt when you think you need it. With time, your taste buds will adjust to less salt. · Eat fewer snack items, fast foods, and other high-salt, processed foods. Check food labels for the amount of sodium in packaged foods. · Choose low-sodium versions of canned goods (such as soups, vegetables, and beans). Limit sugar · Limit drinks and foods with added sugar. These include candy, desserts, and soda pop. Limit alcohol · Limit alcohol to no more than 2 drinks a day for men and 1 drink a day for women. Too much alcohol can cause health problems. When should you call for help? Watch closely for changes in your health, and be sure to contact your doctor if: 
· You would like help planning heart-healthy meals. Where can you learn more? Go to http://delroy-bety.info/. Enter V137 in the search box to learn more about \"Heart-Healthy Diet: Care Instructions. \" Current as of: April 3, 2017 Content Version: 11.3 © 6143-6644 [employer]. Care instructions adapted under license by [employer] (which disclaims liability or warranty for this information).  If you have questions about a medical condition or this instruction, always ask your healthcare professional. [employer] disclaims any warranty or liability for your use of this information. Discharge Orders Procedure Order Date Status Priority Quantity Spec Type Associated Dx REFERRAL TO CARDIAC REHAB 06/21/17 0825 Normal Routine 1    
 CBC WITH AUTOMATED DIFF 06/21/17 0848 Future Routine 1 Blood ACO Transitions of Care Introducing [employer] offers a voluntary care coordination program to provide high quality service and care to [facility] fee-for-service beneficiaries. Tatyana Abt was designed to help you enhance your health and well-being through the following services: ? Transitions of Care  support for individuals who are transitioning from one care setting to another (example: Hospital to home). ? Chronic and Complex Care Coordination  support for individuals and caregivers of those with serious or chronic illnesses or with more than one chronic (ongoing) condition and those who take a number of different medications. If you meet specific medical criteria, a [facility] [address] may call you directly to coordinate your care with your primary care physician and your other care providers. For questions about the [facility] programs, please, contact your physicians office. For general questions or additional information about Accountable Care Organizations: 
Please visit www.medicare.gov/acos. html or call 1-800-MEDICARE (8-944.328.5203) TTY users should call 3-985.555.6149. Subtext Announcement We are excited to announce that we are making your provider's discharge notes available to you in Subtext. You will see these notes when they are completed and signed by the physician that discharged you from your recent hospital stay.   If you have any questions or concerns about any information you see in Subtext, please call the [facility] Department where you were seen or reach out to your Primary Care Provider for more information about your plan of care. Introducing [facility]! Dequan Fowler introduces HelpMeNow patient portal. Now you can access parts of your medical record, email your doctor's office, and request medication refills online. 1. In your internet browser, go to [email or web address] 2. Click on the First Time User? Click Here link in the Sign In box. You will see the New Member Sign Up page. 3. Enter your HelpMeNow Access Code exactly as it appears below. You will not need to use this code after youve completed the sign-up process. If you do not sign up before the expiration date, you must request a new code. · HelpMeNow Access Code: [email or web address] Expires: 8/3/2017  8:23 AM 
 
4. Enter the last four digits of your Social Security Number (xxxx) and Date of Birth (mm/dd/yyyy) as indicated and click Submit. You will be taken to the next sign-up page. 5. Create a HelpMeNow ID. This will be your HelpMeNow login ID and cannot be changed, so think of one that is secure and easy to remember. 6. Create a HelpMeNow password. You can change your password at any time. 7. Enter your Password Reset Question and Answer. This can be used at a later time if you forget your password. 8. Enter your e-mail address. You will receive e-mail notification when new information is available in [address] 9. Click Sign Up. You can now view and download portions of your medical record. 10. Click the Download Summary menu link to download a portable copy of your medical information. If you have questions, please visit the Frequently Asked Questions section of the HelpMeNow website. Remember, HelpMeNow is NOT to be used for urgent needs. For medical emergencies, dial 911. Now available from your iPhone and Android! General Information Please provide this summary of care documentation to your next provider. Patient Signature:  ____________________________________________________________ Date:  ____________________________________________________________  
  
Ruma Iba Provider Signature:  ____________________________________________________________ Date:  ____________________________________________________________

## 2017-06-19 NOTE — PROGRESS NOTES
Spiritual Care visit. Crisis; stemi. Responded to stemi page. Patient taken directly to cath lab. Family was not present.     Visit by Perfecto Reyes M.Ed., Th.B. ,Staff

## 2017-06-19 NOTE — PROCEDURES
Brief Cardiac Procedure Note    Patient: Roby Bergman MRN: [id number]  SSN: [id number]    YOB: 1970  Age: 42 y.o. Sex: male      Date of Procedure: 6/19/2017     Pre-procedure Diagnosis: STEMI    Post-procedure Diagnosis: Coronary artery disease    Procedure: Left Heart Catheterization with PCI    Brief Description of Procedure: As above    Performed By: Terrence Solorio MD     Assistants: None    Anesthesia: Moderate Sedation    Estimated Blood Loss: Less than 10 mL      Specimens: None    Implants: None    Findings: Obstructive CAD. PCI of prox RCA with Ultra 5 x28 and 5 x 18. Post dilated to 5.5. dRCA Integrity 4 x 15 mm. Post dilated to 4.5. Complications: None    Recommendations: Continue medical therapy.     Signed By: Terrence Solorio MD     June 19, 2017

## 2017-06-19 NOTE — PROGRESS NOTES
TRANSFER - IN REPORT:    Verbal report received from Shanna) on [employer].  being received from cath lab(unit) for routine progression of care      Report consisted of patients Situation, Background, Assessment and   Recommendations(SBAR). Information from the following report(s) Kardex, OR Summary, Procedure Summary, MAR and Cardiac Rhythm sr was reviewed with the receiving nurse. Opportunity for questions and clarification was provided. Assessment completed upon patients arrival to unit and care assumed.

## 2017-06-19 NOTE — IP AVS SNAPSHOT
Current Discharge Medication List  
  
START taking these medications Dose & Instructions Dispensing Information Comments Morning Noon Evening Bedtime  
 aspirin 81 mg chewable tablet Your next dose is:  Thursday morning Dose:  81 mg Take 1 Tab by mouth daily. Refills:  0  
     
   
   
   
  
 nitroglycerin 0.4 mg SL tablet Commonly known as:  NITROSTAT Notes to Patient:  As needed for chest pain and chest pressure Dose:  0.4 mg  
1 Tab by SubLINGual route every five (5) minutes as needed for Chest Pain. Quantity:  2 Bottle Refills:  4  
     
   
   
   
  
 pantoprazole 40 mg tablet Commonly known as:  PROTONIX Your next dose is: Tonight at dinner Dose:  40 mg Take 1 Tab by mouth Before breakfast and dinner. Quantity:  60 Tab Refills:  6  
     
   
   
  
   
  
 prasugrel 10 mg tablet Commonly known as:  EFFIENT Your next dose is:  Thursday morning Dose:  10 mg Take 1 Tab by mouth daily. Quantity:  30 Tab Refills:  6 CONTINUE these medications which have CHANGED Dose & Instructions Dispensing Information Comments Morning Noon Evening Bedtime  
 atorvastatin 80 mg tablet Commonly known as:  LIPITOR What changed:   
- medication strength 
- how much to take Your next dose is:  Thursday morning Dose:  80 mg Take 1 Tab by mouth daily. Quantity:  30 Tab Refills:  11  
     
   
   
   
  
 carvedilol 3.125 mg tablet Commonly known as:  Taylor Delvalle What changed:   
- medication strength 
- how much to take Your next dose is: Today with dinner Dose:  3.125 mg Take 1 Tab by mouth two (2) times daily (with meals). Quantity:  60 Tab Refills:  3 CONTINUE these medications which have NOT CHANGED Dose & Instructions Dispensing Information Comments Morning Noon Evening Bedtime  
 canagliflozin 300 mg tablet Commonly known as:  Janelle Edouard Your next dose is:  Thursday morning Dose:  300 mg Take 1 Tab by mouth Daily (before breakfast). Quantity:  30 Tab Refills:  3  
     
   
   
   
  
 cetirizine 10 mg tablet Commonly known as:  ZYRTEC Notes to Patient: Tonight before bed Dose:  10 mg Take 1 Tab by mouth nightly. Quantity:  30 Tab Refills:  0  
     
   
   
   
  
  
 furosemide 80 mg tablet Commonly known as:  LASIX Your next dose is:  Thursday morning Dose:  80 mg Take 1 Tab by mouth daily. Quantity:  30 Tab Refills:  3  
     
   
   
   
  
 gabapentin 300 mg capsule Commonly known as:  NEURONTIN Your next dose is: Tonight before bed Dose:  300 mg Take 1 Cap by mouth nightly. Quantity:  30 Cap Refills:  1 SITagliptin-metFORMIN 100-1,000 mg Tm24 Commonly known as:  JANUMET XR Your next dose is:  Thursday morning Dose:  1 Tab Take 1 Tab by mouth daily. Quantity:  30 Tab Refills:  3 ZANTAC 150 mg tablet Generic drug:  raNITIdine Your next dose is: Today before dinner Dose:  150 mg Take 1 Tab by mouth two (2) times a day. Refills:  0 STOP taking these medications   
 amLODIPine 10 mg tablet Commonly known as:  Parvin Medici Notes to Patient:  STOP TAKING THIS MEDICATION ! ! !  
   
  
 losartan 50 mg tablet Commonly known as:  COZAAR Notes to Patient:  STOP TAKING THIS MEDICATION ! ! !  
   
  
  
  
Where to Get Your Medications Information on where to get these meds will be given to you by the nurse or doctor. ! Ask your nurse or doctor about these medications  
  atorvastatin 80 mg tablet  
 carvedilol 3.125 mg tablet  
 nitroglycerin 0.4 mg SL tablet  
 pantoprazole 40 mg tablet  
 prasugrel 10 mg tablet

## 2017-06-19 NOTE — PROGRESS NOTES
Bedside shift change report given to Laila Garcia  (oncoming nurse) by Mary Jane Crowder  (offgoing nurse). Report included the following information SBAR, Kardex, Procedure Summary, Intake/Output, MAR, Recent Results and Med Rec Status. Dual site assessment of right groin performed. No bleeding/hematoma noted. Pt reports slight chest discomfort, but states it is much better than prior to Cath. Family at bedside. Vs and cardiac monitoring in place. Education on sheath site care reinforced with pt. Vs and cardiac monitoring in place.

## 2017-06-19 NOTE — PROCEDURES
Sandhya Rib[address] 46       Name:  Marii Ram   MR#:  [id number]   :  1970   Account #:  [de-identified]   Date of Adm:  2017       DATE OF PROCEDURE: 2017    PROCEDURES: Left heart catheterization, selective coronary   arteriography, left ventriculogram via the right femoral   approach. INDICATION: Ongoing chest pain with inferior ST elevation. Emergent cardiac catheterization was arranged. TECHNICAL FACTORS: After informed consent was obtained, the   patient was brought emergently to the cardiac catheterization   lab. The right femoral area was prepped and draped in the usual   sterile fashion. Using [facility] ultrasound, a 6-Canadian venous   sheath was placed in the right common femoral vein in case a   pacemaker was needed given his inferior infarction, bradycardia,   and a 6-Canadian arterial sheath was placed in the right common   femoral artery. Damaris Fuelling catheter was used to selectively engage   the ostium of the left main coronary artery and a JR4 guide was   used to selectively engage the ostium of the right coronary   artery. Selective injections in various projections were   performed. The patient was then referred for PCI of his right   coronary artery. Please see procedure note for details. CONTRAST: Isovue, 250 mL. SEDATION: The patient received a total of 4 mg of Versed and 25   mcg of fentanyl, and Versed 6 mg of morphine. Sedation start   time was 1:24, with completion time of the procedure 2:38. Sedation was administered by Edwin Hurd RN, under my   supervision. HEMODYNAMIC RESULTS:   1. Aortic pressure was 102/73 with a mean of 84.   2. Left ventricular end-diastolic pressure was 13.   3. There was no significant gradient across the aortic valve. ANGIOGRAPHIC RESULTS:     1. Left main coronary artery is a large caliber vessel, 20%   distal stenosis. 2. LAD: Large caliber vessel.  Significant ectasia in the   proximal to mid vessel with 20% luminal irregularities. Distal   vessel contains a 50% eccentric stenosis. 3. First diagonal artery: Small caliber vessel. Again, has   ectasia in the mid segment, 60% ostial and proximal stenosis. 4. Second diagonal artery: Small caliber vessel. Ectatic in the   mid segment, 30% ostial stenosis. 5. Ramus intermediate: Medium caliber vessel. Significant   ectasia throughout the vessel with 20% to 30% luminal   irregularities. Distal vessel is very small caliber, but patent. 6. Left circumflex is a medium to large caliber nondominant   vessel. Diffuse ectasia throughout the vessel, 20% luminal   irregularities. 7. First obtuse marginal: A small to medium caliber vessel. Significantly ectatic in the proximal segment with 70% proximal   stenosis. 8. Second obtuse marginal artery: Medium to large caliber   vessel. Patent. 9. Right coronary artery: Large caliber vessel. Occluded in the   proximal segment. Extensive thrombus noted. 10. Left ventriculogram performed in LAI projection shows mildly   reduced LV systolic function, EF 12% and severe inferior   hypokinesis. CONCLUSION: Multivessel coronary artery disease with significant   coronary ectasia diffusely diseased arteries. The culprit of his   active inferior myocardial infarction is his occluded right   coronary artery. PLAN: Proceed with emergent PCI of the RCA. PERCUTANEOUS CORONARY INTERVENTION NOTE      LESION: Proximal RCA. Prestenosis 100% with ONOFRE 0 flow. Post-  stenosis 0% with ONOFRE-3 flow. TECHNICAL FACTORS: A JR4 guide was used to selectively engage   the ostium of the right coronary artery. The patient had   received intravenous heparin and ACT was documented to be 208. Additional 5000 units of heparin was given upon his arrival, an   additional 3000 units was given after ACT was resulted.  A   Whisper extra support wire was ultimately positioned across the   occlusion and a Priority 1 aspiration catheter was then utilized   for aspiration. Through this Reopro was administered   intracoronary. With this, there was restoration of ONOFRE-3 flow. TREK 2.5 x 15 mm balloon was then positioned and deployed to 12   atmospheres. Following predilatation, it was obvious the patient   had a very ectatic and large caliber right coronary artery with   a mcmahon's crook appearance. I did not feel also have adequate   support with a JR4 guide. Therefore, the wire and guide was   removed. The 6-Malawian sheath was exchanged for a 7-Malawian   system. A 7-Malawian AL1 guide was then used to selectively engage   the ostium of right coronary artery. With a moderate amount of   difficulty and a FineCross catheter, I was able to enter the   distal vessel with the Whisper extra support wire. Over the   [address] this was exchanged for an [employer] Star wire. At this   point, the proximal vessel was dilated with a 4.0 x 20 mm TREK   balloon to 12 atmospheres. Following this, a 5.0 x 28 mm Ultra   stent was positioned and deployed at 14 atmospheres. I was   concerned that this did not cover far enough left proximally to   avoid problems with torsion of the vessel once the wire was   pulled back and therefore, a second 5.0 x 18 mm Ultra bare metal   stent was positioned in the proximal vessel extending from the   prior stented segment. This deployed at 14 atmospheres and then   the stent balloon was advanced to cover the overlap with 16   atmospheres. At this point, a 5.5 x 20 mm aviator over-the-wire   balloon was positioned and deployed to 14 atmospheres for   appropriate postdilatation. There is excellent angiographic   result within the proximal right coronary artery. It was noted   the patient had a high-grade 80% mid RCA stent stenosis. At that   overall this was then addressed. At this point, an Integrity 4.0 x 15 mm stent was advanced, but   could not cross the prior stented proximal vessel.  A 2.5 x 15 mm   TREK balloon was then advanced and using this I was able to   telescope a GuideLiner into the mid RCA. Over this, the balloon   was deployed to 6 atmospheres for predilatation. The Integrity   4.0 x 15 mm bare metal stent was then positioned and deployed at   16 atmospheres. Following this, a 4.5 x 15 mm NC Quantum Drumore   was positioned and deployed to 16 atmospheres. Following   postdilatation, there was excellent angiographic result, no   residual stenosis. The wire was removed and AL1 catheter was   removed. At this point, a JR4 catheter was readvanced and used   to engage the right coronary artery to ensure the ostium had not   been affected by aggressive guide selection. This showed that   the artery was widely patent. At this point, the JR4 catheter   was removed. A right common femoral angiogram showed that this sheath was   contained in the right common femoral artery. There was no   significant atherosclerosis. An 8-Citizen of [place] Angio-Seal vascular   closure device was deployed by standard technique with excellent   hemostasis. No complications were encountered. CONCLUSIONS:    1. Successful complex percutaneous coronary intervention of the   right coronary artery due to the large vessel size and ectasia,   a 5.0 x 28 and 5.0 x 18 mm Ultra bare metal stents were placed   in the proximal right coronary artery and postdilated to 5.5 mm   in diameter. The mid vessel was then stented with an Integrity   4.0 x 15 mm bare metal stent, which was post-dilated to 4.5 mm   in diameter. 2. Successful deployment of an 8-Citizen of [place] Angio-Seal vascular   closure device. PLAN: Monitor the patient closely in the postprocedural setting.         MD SHERIDAN Quintero / Ngoc Diaz   D:  06/19/2017   17:54   T:  06/19/2017   18:23   Job #:  [id number]

## 2017-06-19 NOTE — PROGRESS NOTES
Came in room and found pt nauseated and diaphoretic. Pt chest pain about a 2 on the pain scale. Pulled up EKG 12 lead on the monitor and st segments were not any more elevated than upon presentation to the er or ccu. Pt then vomitted approximately 300 ml of blood tinge emesis. He then stated he felt better. Paged Dr. Simon Yeh. Repeating CBC and stopping reopro and call if platelet less than [phone],436. Sheath to remain in place for access per telephone order. Hr drops to 49 and [place] aware. Brandy Cheadle RN at bedside now ([phone]) and report given. Sheath assesed for hematoma and none present. Critical platelets results called from lab  Brandy Cheadle paging Dr. Simon Yeh. Awaiting orders.

## 2017-06-19 NOTE — ROUTINE PROCESS
TRANSFER - OUT REPORT:    STEMI  Dr. Kashif Curran  RFA/RFV access    Versed 4mg, morphine 6mg, fentanyl 25mcg, heparin 8000 units IV, zofran 4mg, effient 60mg 1/2 amp atropine  Reopro bolus and gtt @ 17ml/hr. Gtt to run for 12 hours per Dr. Kashif Curran  3 stents in RCA  Angioseal to RFA  6FR sheath RFV sutured in right groin. May pull sheath in 4 hours per Dr. Ray Zimmer report given to Loretta Hager RN(name) on Teofilo Smack.  being transferred to ccu(unit) for routine progression of care       Report consisted of patients Situation, Background, Assessment and   Recommendations(SBAR). Information from the following report(s) Procedure Summary was reviewed with the receiving nurse. Lines:       Opportunity for questions and clarification was provided.       Patient transported with:   [place]

## 2017-06-19 NOTE — H&P
[facility] CARDIOLOGY History &Physical                 Primary Cardiologist: WILLIAN    Primary Care Physician: Dr. Ana Ward    Admitting Physician: Dr. Chela Sandoval:     Patient is a 42 y.o. male with prior h/o HTN, HLP, and DM. He reports intermittent chest pain over last several weeks that got worse today. EMS arrived at [facility]. EKG in field STEMI and STEMI protocol initiated and sent to cath lab urgently for intervention. Past Medical History:   Diagnosis Date    Arthritis     Benign essential hypertension     CAD (coronary artery disease)     MI in 12/07, neg cath    Chronic pain     Dilated cardiomyopathy (HCC)     GERD (gastroesophageal reflux disease)     Heart failure (HCC)     Hyperlipidemia LDL goal <70     NIDDM (non-insulin dependent diabetes mellitus) 8/3/2009    Psychiatric disorder     depression and anxiety    Renal insufficiency       Past Surgical History:   Procedure Laterality Date    CARDIAC SURG PROCEDURE UNLIST      cardiac cath    HX ORTHOPAEDIC      right femur ORIF surg    HX WISDOM TEETH EXTRACTION        Allergies   Allergen Reactions    Lisinopril Swelling    Ragweed Not Reported This Time     Social History   Substance Use Topics    Smoking status: Current Every Day Smoker     Packs/day: 0.25     Types: Cigarettes    Smokeless tobacco: Never Used    Alcohol use No      FH:   Family History   Problem Relation Age of Onset    Heart Disease Father     Diabetes Mother     Hypertension Mother     Hypertension Father         Review of Systems   Constitution: Negative for diaphoresis, weakness and malaise/fatigue. HENT: Negative for congestion and headaches. Cardiovascular: Positive for chest pain. Negative for claudication, cyanosis, dyspnea on exertion, irregular heartbeat, leg swelling, near-syncope, orthopnea, palpitations, paroxysmal nocturnal dyspnea and syncope.    Respiratory: Negative for cough, shortness of breath and wheezing. Endocrine: Negative for cold intolerance and heat intolerance. Hematologic/Lymphatic: Does not bruise/bleed easily. Skin: Negative for nail changes. Neurological: Negative for dizziness. ROS      Objective: There were no vitals taken for this visit. Physical Exam:  General: Well Developed, Well Nourished, No Acute Distress  HEENT: pupils equal and round, no abnormalities noted  Neck: supple, no JVD, no carotid bruits  Heart: S1S2 with RRR without murmurs or gallops  Lungs: Clear throughout auscultation bilaterally without adventitious sounds  Abd: soft, nontender, nondistended, with good bowel sounds  Ext: warm, no edema, calves supple/nontender, pulses 2+ bilaterally  Skin: warm and dry  Psychiatric: Normal mood and affect  Neurologic: Alert and oriented X 3      ECG: STEMI in inferior leads    Data Review: ordered  No results for input(s): NA, K, MG, BUN, CREA, GLU, WBC, HGB, HCT, PLT, INR, CHOL, LDLC, HDL, TNIPOC, TROIQ, HGBEXT, HCTEXT, PLTEXT, HGBEXT, HCTEXT, PLTEXT in the last 72 hours. No lab exists for component: TGL, HDLC, INREXT, INREXT      CXR: ordered    Assessment/Plan:   Principal Problem:    STEMI (ST elevation myocardial infarction) ([facility] [address] (6/19/2017)- admit to CCU; Will continue DAPT,  high Intensity statin, echo. Continue home med BB and ARB if B/P improves (borderline BPs in cath lab). Active Problems:    Benign essential hypertension (8/3/2009)- mild hypotension; will follow       Diabetes ([facility] (8/3/2009)- hold metformin, add SSI coverage      Hyperlipidemia LDL goal <70 ()- see above changed home Lipitor dose to high intensity. Alisia Patrick NP  6/19/2017  1:34 PM     [facility] CARDIOLOGY     6/19/2017     2:56 PM    I have personally seen and examined Annabelle Still with  Madi Umaña NP.   I agree and confirm findings in history, physical exam, and assessment/plan as outlined above with following pertinent additions/exceptions:   Patient presents with chest pain and inferior STEMI. Multiple CV risk factors. Rates pain 10/10. Associated dyspnea and lightheadedness. PE:  CV: RRR  L: CTA bilaterally. E: normal femoral pulse. ASS/Plan:  Emergent LHC.   Has received IV heparin, ASA and NTG prior to arrival.       Lai Lozada MD

## 2017-06-20 PROBLEM — D69.6 THROMBOCYTOPENIA (HCC): Status: ACTIVE | Noted: 2017-06-20

## 2017-06-20 PROBLEM — K92.0 GASTROINTESTINAL HEMORRHAGE WITH HEMATEMESIS: Status: ACTIVE | Noted: 2017-06-20

## 2017-06-20 LAB
ACT BLD: 307 SECS (ref 70–128)
ANION GAP BLD CALC-SCNC: 11 MMOL/L (ref 7–16)
BLD PROD TYP BPU: NORMAL
BLD PROD TYP BPU: NORMAL
BPU ID: NORMAL
BPU ID: NORMAL
BUN SERPL-MCNC: 15 MG/DL (ref 6–23)
CALCIUM SERPL-MCNC: 8.4 MG/DL (ref 8.3–10.4)
CHLORIDE SERPL-SCNC: 113 MMOL/L (ref 98–107)
CHOLEST SERPL-MCNC: 168 MG/DL
CO2 SERPL-SCNC: 23 MMOL/L (ref 21–32)
CREAT SERPL-MCNC: 1.21 MG/DL (ref 0.8–1.5)
ERYTHROCYTE [DISTWIDTH] IN BLOOD BY AUTOMATED COUNT: 14.2 % (ref 11.9–14.6)
ERYTHROCYTE [DISTWIDTH] IN BLOOD BY AUTOMATED COUNT: 14.2 % (ref 11.9–14.6)
ERYTHROCYTE [DISTWIDTH] IN BLOOD BY AUTOMATED COUNT: 14.3 % (ref 11.9–14.6)
ERYTHROCYTE [DISTWIDTH] IN BLOOD BY AUTOMATED COUNT: 14.3 % (ref 11.9–14.6)
GLUCOSE BLD STRIP.AUTO-MCNC: 156 MG/DL (ref 65–100)
GLUCOSE BLD STRIP.AUTO-MCNC: 83 MG/DL (ref 65–100)
GLUCOSE BLD STRIP.AUTO-MCNC: 97 MG/DL (ref 65–100)
GLUCOSE BLD STRIP.AUTO-MCNC: 98 MG/DL (ref 65–100)
GLUCOSE SERPL-MCNC: 93 MG/DL (ref 65–100)
HCT VFR BLD AUTO: 43.4 % (ref 41.1–50.3)
HCT VFR BLD AUTO: 44.2 % (ref 41.1–50.3)
HCT VFR BLD AUTO: 44.6 % (ref 41.1–50.3)
HCT VFR BLD AUTO: 44.8 % (ref 41.1–50.3)
HDLC SERPL-MCNC: 32 MG/DL (ref 40–60)
HDLC SERPL: 5.3 {RATIO}
HGB BLD-MCNC: 14.6 G/DL (ref 13.6–17.2)
HGB BLD-MCNC: 14.7 G/DL (ref 13.6–17.2)
HGB BLD-MCNC: 15 G/DL (ref 13.6–17.2)
HGB BLD-MCNC: 15 G/DL (ref 13.6–17.2)
LDLC SERPL CALC-MCNC: 119.6 MG/DL
LIPID PROFILE,FLP: ABNORMAL
MCH RBC QN AUTO: 29.4 PG (ref 26.1–32.9)
MCH RBC QN AUTO: 29.5 PG (ref 26.1–32.9)
MCH RBC QN AUTO: 29.6 PG (ref 26.1–32.9)
MCH RBC QN AUTO: 30.1 PG (ref 26.1–32.9)
MCHC RBC AUTO-ENTMCNC: 33 G/DL (ref 31.4–35)
MCHC RBC AUTO-ENTMCNC: 33 G/DL (ref 31.4–35)
MCHC RBC AUTO-ENTMCNC: 33.5 G/DL (ref 31.4–35)
MCHC RBC AUTO-ENTMCNC: 34.6 G/DL (ref 31.4–35)
MCV RBC AUTO: 87.1 FL (ref 79.6–97.8)
MCV RBC AUTO: 88.2 FL (ref 79.6–97.8)
MCV RBC AUTO: 89.2 FL (ref 79.6–97.8)
MCV RBC AUTO: 89.5 FL (ref 79.6–97.8)
PLATELET # BLD AUTO: 66 K/UL (ref 150–450)
PLATELET # BLD AUTO: 68 K/UL (ref 150–450)
PLATELET # BLD AUTO: 72 K/UL (ref 150–450)
PLATELET # BLD AUTO: 82 K/UL (ref 150–450)
PMV BLD AUTO: 9.6 FL (ref 10.8–14.1)
PMV BLD AUTO: 9.8 FL (ref 10.8–14.1)
PMV BLD AUTO: 9.8 FL (ref 10.8–14.1)
PMV BLD AUTO: 9.9 FL (ref 10.8–14.1)
POTASSIUM SERPL-SCNC: 3.9 MMOL/L (ref 3.5–5.1)
RBC # BLD AUTO: 4.94 M/UL (ref 4.23–5.67)
RBC # BLD AUTO: 4.98 M/UL (ref 4.23–5.67)
RBC # BLD AUTO: 5 M/UL (ref 4.23–5.67)
RBC # BLD AUTO: 5.08 M/UL (ref 4.23–5.67)
SODIUM SERPL-SCNC: 147 MMOL/L (ref 136–145)
STATUS OF UNIT,%ST: NORMAL
STATUS OF UNIT,%ST: NORMAL
TRIGL SERPL-MCNC: 82 MG/DL (ref 35–150)
UNIT DIVISION, %UDIV: 0
UNIT DIVISION, %UDIV: 0
VLDLC SERPL CALC-MCNC: 16.4 MG/DL (ref 6–23)
WBC # BLD AUTO: 10.4 K/UL (ref 4.3–11.1)
WBC # BLD AUTO: 9.3 K/UL (ref 4.3–11.1)
WBC # BLD AUTO: 9.4 K/UL (ref 4.3–11.1)
WBC # BLD AUTO: 9.9 K/UL (ref 4.3–11.1)

## 2017-06-20 PROCEDURE — 74011250636 HC RX REV CODE- 250/636: Performed by: INTERNAL MEDICINE

## 2017-06-20 PROCEDURE — 74011250637 HC RX REV CODE- 250/637: Performed by: INTERNAL MEDICINE

## 2017-06-20 PROCEDURE — 80061 LIPID PANEL: CPT | Performed by: NURSE PRACTITIONER

## 2017-06-20 PROCEDURE — 80048 BASIC METABOLIC PNL TOTAL CA: CPT | Performed by: NURSE PRACTITIONER

## 2017-06-20 PROCEDURE — 74011250637 HC RX REV CODE- 250/637: Performed by: NURSE PRACTITIONER

## 2017-06-20 PROCEDURE — 74011636637 HC RX REV CODE- 636/637: Performed by: NURSE PRACTITIONER

## 2017-06-20 PROCEDURE — 82962 GLUCOSE BLOOD TEST: CPT

## 2017-06-20 PROCEDURE — 36415 COLL VENOUS BLD VENIPUNCTURE: CPT | Performed by: INTERNAL MEDICINE

## 2017-06-20 PROCEDURE — 65620000000 HC RM CCU GENERAL

## 2017-06-20 PROCEDURE — 74011250636 HC RX REV CODE- 250/636: Performed by: NURSE PRACTITIONER

## 2017-06-20 PROCEDURE — C8929 TTE W OR WO FOL WCON,DOPPLER: HCPCS

## 2017-06-20 PROCEDURE — 85027 COMPLETE CBC AUTOMATED: CPT | Performed by: INTERNAL MEDICINE

## 2017-06-20 PROCEDURE — 74011000250 HC RX REV CODE- 250: Performed by: INTERNAL MEDICINE

## 2017-06-20 PROCEDURE — 86022 PLATELET ANTIBODIES: CPT | Performed by: INTERNAL MEDICINE

## 2017-06-20 RX ORDER — MUPIROCIN 20 MG/G
OINTMENT TOPICAL EVERY 12 HOURS
Status: DISCONTINUED | OUTPATIENT
Start: 2017-06-20 | End: 2017-06-21 | Stop reason: HOSPADM

## 2017-06-20 RX ORDER — PRASUGREL 10 MG/1
10 TABLET, FILM COATED ORAL DAILY
Status: DISCONTINUED | OUTPATIENT
Start: 2017-06-20 | End: 2017-06-21 | Stop reason: HOSPADM

## 2017-06-20 RX ORDER — ALPRAZOLAM 0.5 MG/1
1 TABLET ORAL
Status: DISCONTINUED | OUTPATIENT
Start: 2017-06-20 | End: 2017-06-21 | Stop reason: HOSPADM

## 2017-06-20 RX ORDER — MAG HYDROX/ALUMINUM HYD/SIMETH 200-200-20
30 SUSPENSION, ORAL (FINAL DOSE FORM) ORAL
Status: DISCONTINUED | OUTPATIENT
Start: 2017-06-20 | End: 2017-06-21 | Stop reason: HOSPADM

## 2017-06-20 RX ORDER — PANTOPRAZOLE SODIUM 40 MG/1
40 TABLET, DELAYED RELEASE ORAL 2 TIMES DAILY
Status: DISCONTINUED | OUTPATIENT
Start: 2017-06-20 | End: 2017-06-21 | Stop reason: HOSPADM

## 2017-06-20 RX ADMIN — PANTOPRAZOLE SODIUM 40 MG: 40 TABLET, DELAYED RELEASE ORAL at 17:20

## 2017-06-20 RX ADMIN — LORATADINE 10 MG: 10 TABLET ORAL at 22:09

## 2017-06-20 RX ADMIN — Medication 10 ML: at 22:09

## 2017-06-20 RX ADMIN — SODIUM CHLORIDE 75 ML/HR: 900 INJECTION, SOLUTION INTRAVENOUS at 01:02

## 2017-06-20 RX ADMIN — ALUMINUM HYDROXIDE, MAGNESIUM HYDROXIDE, AND SIMETHICONE 30 ML: 200; 200; 20 SUSPENSION ORAL at 16:49

## 2017-06-20 RX ADMIN — PANTOPRAZOLE SODIUM 40 MG: 40 TABLET, DELAYED RELEASE ORAL at 09:22

## 2017-06-20 RX ADMIN — INSULIN LISPRO 2 UNITS: 100 INJECTION, SOLUTION INTRAVENOUS; SUBCUTANEOUS at 22:08

## 2017-06-20 RX ADMIN — ASPIRIN 81 MG 81 MG: 81 TABLET ORAL at 09:22

## 2017-06-20 RX ADMIN — SODIUM CHLORIDE 75 ML/HR: 900 INJECTION, SOLUTION INTRAVENOUS at 07:12

## 2017-06-20 RX ADMIN — MUPIROCIN: 20 OINTMENT TOPICAL at 21:00

## 2017-06-20 RX ADMIN — PERFLUTREN 1 ML: 6.52 INJECTION, SUSPENSION INTRAVENOUS at 10:47

## 2017-06-20 RX ADMIN — Medication 10 ML: at 16:50

## 2017-06-20 RX ADMIN — Medication 2 MG: at 16:49

## 2017-06-20 RX ADMIN — ALPRAZOLAM 1 MG: 0.5 TABLET ORAL at 17:20

## 2017-06-20 RX ADMIN — ATORVASTATIN CALCIUM 80 MG: 40 TABLET, FILM COATED ORAL at 09:22

## 2017-06-20 RX ADMIN — MUPIROCIN: 20 OINTMENT TOPICAL at 12:21

## 2017-06-20 RX ADMIN — GABAPENTIN 300 MG: 300 CAPSULE ORAL at 22:09

## 2017-06-20 RX ADMIN — Medication 10 ML: at 07:12

## 2017-06-20 RX ADMIN — PRASUGREL HYDROCHLORIDE 10 MG: 10 TABLET, FILM COATED ORAL at 09:22

## 2017-06-20 NOTE — PROGRESS NOTES
Verbal and bedside report received from [address]. Pt alert and oriented, denies pain or distress. Tolerating clear liquids. Sinus bradycardia- rate in 50s- on monitor. Call light within reach, activity restrictions and reportable sx reviewed.

## 2017-06-20 NOTE — PROGRESS NOTES
Pt's venous sheath pulled at 1100. Manual pressure for 5 minutes, no bleeding noted. Pt now up, walked to sink, gave self a hibiclens bath and brushed his teeth. Pt with VSS, no syncope/shortness of breath/distress with ambulation. He is now eating lunch and tolerating well. Pt encouraged to continue to call for assistance when he needs to get up so that we can help him with management of monitor cables.

## 2017-06-20 NOTE — PROGRESS NOTES
[facility] CARDIOLOGY PROGRESS NOTE           6/20/2017 6:59 AM    Admit Date: 6/19/2017      Subjective:   Patient with profound thrombocytopenia after Reopro administration. Required 2 units of platelets. Last Platelet count 67B. Had bloody emiesis last night. Hgb stable this AM. He denies complaints. No chest pain or dyspnea. BP low. ROS:  Cardiovascular:  As noted above  Pulmonary:  No dyspnea  Neuro: No weakness   GI: Bloody emesis last night. Denies nausea currently. Heme:  No history of thrombocytopenia. Objective:      Vitals:    06/20/17 0401 06/20/17 0431 06/20/17 0501 06/20/17 0531   BP: 106/67 113/63 98/61 101/60   Pulse: (!) 58 (!) 57 (!) 55 (!) 58   Resp:  15 16 14   Temp:       SpO2: 99% 100% 99% 100%   Weight:           Physical Exam:  General-No Acute Distress  Neck- supple, no JVD  CV- regular rate and rhythm no MRG  Lung- clear bilaterally  Abd- soft, nontender, nondistended  Ext- no edema bilaterally. Right femoral venous sheath in place. Skin- warm and dry  Neur:  A and oreinted X3. No weakness   Psych:  Normal mood and affect. Data Review:   Recent Labs      06/20/17   0445  06/20/17   0150  06/19/17   1954   06/19/17   1747   NA  147*   --    --    --   144   K  3.9   --    --    --   3.7   BUN  15   --    --    --   16   CREA  1.21   --    --    --   1.12   GLU  93   --    --    --   92   WBC   --   10.4  12.5*   < >  12.1*   HGB   --   14.7  16.6   < >  17.1   HCT   --   44.6  48.4   < >  49.2   PLT   --   82*  1*   < >  5*   TRIGL  82   --    --    --    --    HDL  32*   --    --    --    --     < > = values in this interval not displayed. Lab Results   Component Value Date/Time    TROIQ 5.82 ([facility]) 06/19/2017 07:54 PM    TROIQ 3.15 ([facility]) 06/19/2017 05:47 PM       Assessment/Plan:     Principal Problem:    STEMI (ST elevation myocardial infarction) ([facility] [address] (6/19/2017)  S/P complex PCI of RCA. POD # 1. Echo today. ASA and Effient. Active Problems:    Benign essential hypertension (8/3/2009)  BP low. Holding home medications. Will need to follow BP and restat B-blocker as stabilized. Diabetes ([facility] [address] (8/3/2009)  SSI. Hyperlipidemia LDL goal <70 ()  On Lipitor 80 mg PO QHS. Thrombocytopenia ([facility] [address] (6/20/2017)  Profound. Likely due to reopro. Reopro stopped. Check CBC q 6 hours. Check HIT panel. Gastrointestinal hemorrhage with hematemesis (6/20/2017)  No active bleeding with correction of thrombocytopenia. Start Protonix BID. GI consult in case recurrence. Hgb stable. CBC every 6 hours.                  Delia Juan MD  6/20/2017 6:59 AM

## 2017-06-20 NOTE — PROGRESS NOTES
Pt reporting nausea, heartburn, and restlessness. VSS. Mylanta given with some immediate relief of reflux sx. Morphine also given which seemed to ease the pain further. No rhythm changes noted. Pt now in sinus rhythm in the 60s-70s.

## 2017-06-20 NOTE — PROGRESS NOTES
Discussed pt lab results with Dr. Medardo Harding. Will transfuse 2 units platelets and recheck CBC one hour after transfusion completion. If platelets still below 25,000 will transfuse two more units. CBC checks Q6H afterwards.

## 2017-06-20 NOTE — INTERDISCIPLINARY ROUNDS
Interdisciplinary team rounds were held 6/20/2017 with the following team members:Care Management, Nursing, Nurse Practitioner, Nutrition, Pastoral Care, Pharmacy, Physical Therapy, Physician, Respiratory Therapy, Wound Care and Clinical Coordinator and the patient. Plan of care discussed. See clinical pathway and/or care plan for interventions and desired outcomes.

## 2017-06-20 NOTE — PROGRESS NOTES
LEAPFROG PROTOCOL NOTE    Santino Lindsey.  6/19/2017    The patient is currently in the critical care setting managed by Dr. Lorena Joel with inferior MI/RCA occlusion s/p PCI with bare metal stents x 3. The patient's chart is reviewed and the patient is discussed with the staff. Patient is currently hemodynamically stable. Patient has no needs identified for Intensivist management in the critical care setting at this time. Please notify us if can be of assistance. No charge billed to the patient. Thank you.     Vandana Yun MD

## 2017-06-20 NOTE — CONSULTS
[facility] Consult Note       Primary GI Physician: Dr. Deanna Castañeda    Referring Physician: dr. Poppy Oglesby Date:  6/20/2017    Admit Date:  6/19/2017    Chief Complaint:  hematemesis    Subjective:     History of Present Illness:  Patient is a 42 y.o. male with PMH of CAD s/p stents, Dilated cardiomyopathy, CHF, DM2, GERD, who is seen in consultation at the request of Dr. Jenny Thurman for hematemsis. Pt reports long hx (since childhood) of GERD with substernal burning several times a week resulting in burning chest pain relieved with Zantac 150mg (uses prn due to cost). He had EGD 2010 by Dr. Naya Torrez with grade B esophagitis but without barretts. He was admitted 6/19 with inferior MI/RCA occlusion s/p PCI with bare metal stents x 3. He developed profound thrombocytopenia after Reopro administration and Required 2 units of platelets. Plt count dropped to 1k and is now 72k. Had bloody emiesis last night x1 but hgb stable. He denies diarrhea, constipation, melena, or further N/V. He denies  chest pain or dyspnea currently. PMH:  Past Medical History:   Diagnosis Date    Arthritis     Benign essential hypertension     CAD (coronary artery disease)     MI in 12/07, neg cath    Chronic pain     Dilated cardiomyopathy (HCC)     GERD (gastroesophageal reflux disease)     Heart failure (HCC)     Hyperlipidemia LDL goal <70     NIDDM (non-insulin dependent diabetes mellitus) 8/3/2009    Psychiatric disorder     depression and anxiety    Renal insufficiency        PSH:  Past Surgical History:   Procedure Laterality Date    CARDIAC SURG PROCEDURE UNLIST      cardiac cath    HX ORTHOPAEDIC      right femur ORIF surg    HX WISDOM TEETH EXTRACTION         Allergies: Allergies   Allergen Reactions    Lisinopril Swelling    Ragweed Not Reported This Time       Home Medications:  Prior to Admission medications    Medication Sig Start Date End Date Taking?  Authorizing Provider   furosemide (LASIX) 80 mg tablet Take 1 Tab by mouth daily. 5/8/17   Jonnie Mendoza MD   carvedilol (COREG) 25 mg tablet Take 1 Tab by mouth two (2) times daily (with meals). 5/8/17   Jonnie Mendoza MD   SITagliptin-metFORMIN (JANUMET XR) 100-1,000 mg TM24 Take 1 Tab by mouth daily. 5/8/17   Jonnie Mendoza MD   losartan (COZAAR) 50 mg tablet Take 1 Tab by mouth daily. 5/8/17   Jonnie Mendoza MD   amLODIPine (NORVASC) 10 mg tablet Take 1 Tab by mouth daily. 5/8/17   Jonnie Mendoza MD   atorvastatin (LIPITOR) 10 mg tablet Take 1 Tab by mouth daily. 5/8/17   Jonnie Mendoza MD   canagliflozin [facility]) 300 mg tablet Take 1 Tab by mouth Daily (before breakfast). 5/8/17   Jonnie Mendoza MD   gabapentin (NEURONTIN) 300 mg capsule Take 1 Cap by mouth nightly. 5/8/17   Jonnie Mendoza MD   cetirizine (ZYRTEC) 10 mg tablet Take 1 Tab by mouth nightly.  3/28/15   WAYNE Black       Hospital Medications:  Current Facility-Administered Medications   Medication Dose Route Frequency    pantoprazole (PROTONIX) tablet 40 mg  40 mg Oral BID    prasugrel (EFFIENT) tablet 10 mg  10 mg Oral DAILY    mupirocin (BACTROBAN) 2 % ointment   Both Nostrils Q12H    gabapentin (NEURONTIN) capsule 300 mg  300 mg Oral QHS    loratadine (CLARITIN) tablet 10 mg  10 mg Oral QHS    sodium chloride (NS) flush 5-10 mL  5-10 mL IntraVENous Q8H    sodium chloride (NS) flush 5-10 mL  5-10 mL IntraVENous PRN    aspirin chewable tablet 81 mg  81 mg Oral DAILY    nitroglycerin (NITROSTAT) tablet 0.4 mg  0.4 mg SubLINGual Q5MIN PRN    morphine injection 2 mg  2 mg IntraVENous Q4H PRN    acetaminophen (TYLENOL) tablet 650 mg  650 mg Oral Q4H PRN    insulin lispro (HUMALOG) injection   SubCUTAneous AC&HS    atorvastatin (LIPITOR) tablet 80 mg  80 mg Oral DAILY    midazolam (VERSED) injection 0.5-2 mg  0.5-2 mg IntraVENous ONCE PRN    lidocaine (XYLOCAINE) 20 mg/mL (2 %) injection  mg  2-10 mL IntraDERMal Multiple  heparin (PF) 2 units/ml in NS infusion  3 Units/hr IntraarTERial CONTINUOUS    fentaNYL citrate (PF) injection 25-75 mcg  25-75 mcg IntraVENous Multiple    heparin (porcine) 10,000 unit/mL injection 1,000-5,000 Units  1,000-5,000 Units IntraVENous Multiple    atropine injection 0.5 mg  0.5 mg IntraVENous PRN    morphine injection 1-5 mg  1-5 mg IntraVENous Multiple    ondansetron (ZOFRAN) injection 4 mg  4 mg IntraVENous PRN    0.9% sodium chloride infusion  75 mL/hr IntraVENous CONTINUOUS    promethazine (PHENERGAN) with saline injection 25 mg  25 mg IntraVENous Q6H PRN    0.9% sodium chloride infusion 250 mL  250 mL IntraVENous PRN       Social History:  Social History   Substance Use Topics    Smoking status: Current Every Day Smoker     Packs/day: 0.25     Types: Cigarettes    Smokeless tobacco: Never Used    Alcohol use No       Pt denies any history of drug use, blood transfusions, or tattoos. Family History:  Family History   Problem Relation Age of Onset    Heart Disease Father     Diabetes Mother     Hypertension Mother     Hypertension Father        Review of Systems:  A detailed 10 system ROS is obtained, with pertinent positives as listed above. All others are negative. Diet:  NPO    Objective:     Physical Exam:  Vitals:  Visit Vitals    /60    Pulse (!) 58    Temp 98.1 °F (36.7 °C)    Resp 14    Wt 110.2 kg (242 lb 15.2 oz)    SpO2 100%    BMI 30.37 kg/m2     Gen:  Pt is alert, cooperative, no acute distress  Skin:  Extremities and face reveal no rashes. Tattoos noted. HEENT: Sclerae anicteric. Extra-occular muscles are intact. No oral ulcers. No abnormal pigmentation of the lips. The neck is supple. Cardiovascular: Regular rate and rhythm. No murmurs, gallops, or rubs. Respiratory:  Comfortable breathing with no accessory muscle use. Clear breath sounds anteriorly with no wheezes, rales, or rhonchi. GI:  Abdomen nondistended, soft, and nontender.   Normal active bowel sounds. No enlargement of the liver or spleen. No masses palpable. Rectal:  Deferred  Musculoskeletal:  No pitting edema of the lower legs. Neurological:  Gross memory appears intact. Patient is alert and oriented. Psychiatric:  Mood appears appropriate with judgement intact. Laboratory:    Recent Labs      06/20/17   0717  06/20/17   0445  06/20/17   0150  06/19/17   1954  06/19/17   1845  06/19/17   1747   WBC  9.9   --   10.4  12.5*  12.4*  12.1*   HGB  14.6   --   14.7  16.6  16.8  17.1   HCT  44.2   --   44.6  48.4  48.7  49.2   PLT  72*   --   82*  1*  1*  5*   MCV  89.5   --   89.2  87.5  87.0  86.6   NA   --   147*   --    --    --   144   K   --   3.9   --    --    --   3.7   CL   --   113*   --    --    --   111*   CO2   --   23   --    --    --   22   BUN   --   15   --    --    --   16   CREA   --   1.21   --    --    --   1.12   CA   --   8.4   --    --    --   8.3   GLU   --   93   --    --    --   92   AP   --    --    --    --    --   103   SGOT   --    --    --    --    --   23   ALT   --    --    --    --    --   20   TBILI   --    --    --    --    --   0.6   ALB   --    --    --    --    --   3.7   TP   --    --    --    --    --   7.5          Assessment:     Principal Problem:    STEMI (ST elevation myocardial infarction) ([facility] (6/19/2017)    Active Problems:    Benign essential hypertension (8/3/2009)      Diabetes [facility] (8/3/2009)      Hyperlipidemia LDL goal <70 ()      Thrombocytopenia (HCC) (6/20/2017)      Gastrointestinal hemorrhage with hematemesis (6/20/2017)    43 yo male with long hx of GERD and hx of grade B esophagitis on EGD 2010 who was admitted with inferior MI with subsequent stent placement. Developed severe thombocytopenia with Reopro which has been discontinued. S/p transfusion of 2 units platelets and plt count now 72k. Reports one episode hematemesis without drop in hgb or further issues.     Plan:     - continue protonix 40mg BID for now (pt states this increased his BP in the past)  - at time of discharge, will need prescription of Zantac 150mg BID  - will sign off and  schedule outpatient follow up. Please call sooner if needed. Antoni Dsouza      Patient is seen and examined in collaboration with Dr. Scott Abraham. Assessment and plan as per Dr. Uziel Gracia. Will avoid endoscopy unless he rebleeds  Call if needed    PT SEEN AND EXAMINED AND PLAN DISCUSSED AND IMPLEMENTED.   Yadiel Paniagua MD

## 2017-06-21 VITALS
TEMPERATURE: 98.7 F | RESPIRATION RATE: 16 BRPM | SYSTOLIC BLOOD PRESSURE: 128 MMHG | BODY MASS INDEX: 29.03 KG/M2 | HEIGHT: 75 IN | WEIGHT: 233.5 LBS | HEART RATE: 75 BPM | OXYGEN SATURATION: 99 % | DIASTOLIC BLOOD PRESSURE: 88 MMHG

## 2017-06-21 LAB
ANION GAP BLD CALC-SCNC: 10 MMOL/L (ref 7–16)
BASOPHILS # BLD AUTO: 0 K/UL (ref 0–0.2)
BASOPHILS # BLD: 1 % (ref 0–2)
BUN SERPL-MCNC: 14 MG/DL (ref 6–23)
CALCIUM SERPL-MCNC: 8 MG/DL (ref 8.3–10.4)
CHLORIDE SERPL-SCNC: 112 MMOL/L (ref 98–107)
CO2 SERPL-SCNC: 22 MMOL/L (ref 21–32)
CREAT SERPL-MCNC: 1.06 MG/DL (ref 0.8–1.5)
DIFFERENTIAL METHOD BLD: ABNORMAL
EOSINOPHIL # BLD: 0.3 K/UL (ref 0–0.8)
EOSINOPHIL NFR BLD: 4 % (ref 0.5–7.8)
ERYTHROCYTE [DISTWIDTH] IN BLOOD BY AUTOMATED COUNT: 14 % (ref 11.9–14.6)
GLUCOSE BLD STRIP.AUTO-MCNC: 83 MG/DL (ref 65–100)
GLUCOSE SERPL-MCNC: 81 MG/DL (ref 65–100)
HCT VFR BLD AUTO: 43.9 % (ref 41.1–50.3)
HGB BLD-MCNC: 14.6 G/DL (ref 13.6–17.2)
IMM GRANULOCYTES # BLD: 0 K/UL (ref 0–0.5)
IMM GRANULOCYTES NFR BLD AUTO: 0.2 % (ref 0–5)
LYMPHOCYTES # BLD AUTO: 33 % (ref 13–44)
LYMPHOCYTES # BLD: 2.9 K/UL (ref 0.5–4.6)
MAGNESIUM SERPL-MCNC: 2.3 MG/DL (ref 1.8–2.4)
MCH RBC QN AUTO: 29.3 PG (ref 26.1–32.9)
MCHC RBC AUTO-ENTMCNC: 33.3 G/DL (ref 31.4–35)
MCV RBC AUTO: 88 FL (ref 79.6–97.8)
MONOCYTES # BLD: 1 K/UL (ref 0.1–1.3)
MONOCYTES NFR BLD AUTO: 12 % (ref 4–12)
NEUTS SEG # BLD: 4.4 K/UL (ref 1.7–8.2)
NEUTS SEG NFR BLD AUTO: 50 % (ref 43–78)
PLATELET # BLD AUTO: 69 K/UL (ref 150–450)
PMV BLD AUTO: 10 FL (ref 10.8–14.1)
POTASSIUM SERPL-SCNC: 3.5 MMOL/L (ref 3.5–5.1)
RBC # BLD AUTO: 4.99 M/UL (ref 4.23–5.67)
SODIUM SERPL-SCNC: 144 MMOL/L (ref 136–145)
WBC # BLD AUTO: 8.6 K/UL (ref 4.3–11.1)

## 2017-06-21 PROCEDURE — 36415 COLL VENOUS BLD VENIPUNCTURE: CPT | Performed by: INTERNAL MEDICINE

## 2017-06-21 PROCEDURE — 83735 ASSAY OF MAGNESIUM: CPT | Performed by: INTERNAL MEDICINE

## 2017-06-21 PROCEDURE — 82962 GLUCOSE BLOOD TEST: CPT

## 2017-06-21 PROCEDURE — 80048 BASIC METABOLIC PNL TOTAL CA: CPT | Performed by: INTERNAL MEDICINE

## 2017-06-21 PROCEDURE — 74011250637 HC RX REV CODE- 250/637: Performed by: INTERNAL MEDICINE

## 2017-06-21 PROCEDURE — 85025 COMPLETE CBC W/AUTO DIFF WBC: CPT | Performed by: INTERNAL MEDICINE

## 2017-06-21 PROCEDURE — 74011250637 HC RX REV CODE- 250/637: Performed by: NURSE PRACTITIONER

## 2017-06-21 RX ORDER — CARVEDILOL 3.12 MG/1
3.12 TABLET ORAL 2 TIMES DAILY WITH MEALS
Status: DISCONTINUED | OUTPATIENT
Start: 2017-06-21 | End: 2017-06-21 | Stop reason: HOSPADM

## 2017-06-21 RX ORDER — RANITIDINE 150 MG/1
150 TABLET, FILM COATED ORAL 2 TIMES DAILY
Status: SHIPPED | COMMUNITY
Start: 2017-06-21 | End: 2017-07-12

## 2017-06-21 RX ORDER — ATORVASTATIN CALCIUM 80 MG/1
80 TABLET, FILM COATED ORAL DAILY
Qty: 30 TAB | Refills: 11 | Status: SHIPPED | OUTPATIENT
Start: 2017-06-21 | End: 2018-01-12 | Stop reason: SDUPTHER

## 2017-06-21 RX ORDER — PANTOPRAZOLE SODIUM 40 MG/1
40 TABLET, DELAYED RELEASE ORAL
Qty: 60 TAB | Refills: 6 | Status: SHIPPED | OUTPATIENT
Start: 2017-06-21 | End: 2018-01-12 | Stop reason: SDUPTHER

## 2017-06-21 RX ORDER — NITROGLYCERIN 0.4 MG/1
0.4 TABLET SUBLINGUAL
Qty: 2 BOTTLE | Refills: 4 | Status: SHIPPED | OUTPATIENT
Start: 2017-06-21 | End: 2019-11-05 | Stop reason: SDUPTHER

## 2017-06-21 RX ORDER — GUAIFENESIN 100 MG/5ML
81 LIQUID (ML) ORAL DAILY
Status: SHIPPED | COMMUNITY
Start: 2017-06-21

## 2017-06-21 RX ORDER — PRASUGREL 10 MG/1
10 TABLET, FILM COATED ORAL DAILY
Qty: 30 TAB | Refills: 6 | Status: SHIPPED | OUTPATIENT
Start: 2017-06-21 | End: 2018-01-12 | Stop reason: SDUPTHER

## 2017-06-21 RX ORDER — CARVEDILOL 3.12 MG/1
3.12 TABLET ORAL 2 TIMES DAILY WITH MEALS
Qty: 60 TAB | Refills: 3 | Status: SHIPPED | OUTPATIENT
Start: 2017-06-21 | End: 2017-09-12 | Stop reason: SDUPTHER

## 2017-06-21 RX ADMIN — MUPIROCIN: 20 OINTMENT TOPICAL at 09:03

## 2017-06-21 RX ADMIN — ATORVASTATIN CALCIUM 80 MG: 40 TABLET, FILM COATED ORAL at 09:02

## 2017-06-21 RX ADMIN — CARVEDILOL 3.12 MG: 3.12 TABLET, FILM COATED ORAL at 09:03

## 2017-06-21 RX ADMIN — Medication 10 ML: at 06:25

## 2017-06-21 RX ADMIN — ASPIRIN 81 MG 81 MG: 81 TABLET ORAL at 09:03

## 2017-06-21 RX ADMIN — PANTOPRAZOLE SODIUM 40 MG: 40 TABLET, DELAYED RELEASE ORAL at 09:03

## 2017-06-21 RX ADMIN — PRASUGREL HYDROCHLORIDE 10 MG: 10 TABLET, FILM COATED ORAL at 09:03

## 2017-06-21 NOTE — PROGRESS NOTES
Patient arrived to room 317, heart monitor applied, NSR noted on monitor, VSS. Pt oriented to room, educated on call bell, instructed to call for assistance - call bell within reach. Dual skin assessment completed with secondary RN: R groin site with dressing intact, sacrum intact, all other skin noted to be intact - no breakdown noted.

## 2017-06-21 NOTE — PROGRESS NOTES
TRANSFER - OUT REPORT:    [facility]  [facility] to [address] [place] [address] for routine progression of care       Report consisted of patients Situation, Background, Assessment and   Recommendations(SBAR). Information from the following report(s) SBAR, Kardex, Procedure Summary, Intake/Output, MAR, Recent Results, Med Rec Status and Cardiac Rhythm of NSR was reviewed with the receiving nurse. Lines:   Peripheral IV 06/19/17 Left Arm (Active)   Site Assessment Clean, dry, & intact 6/21/2017  3:00 AM   Phlebitis Assessment 0 6/21/2017  3:00 AM   Infiltration Assessment 0 6/21/2017  3:00 AM   Dressing Status Clean, dry, & intact 6/21/2017  3:00 AM   Dressing Type Transparent;Tape 6/21/2017  3:00 AM   Hub Color/Line Status Capped; Patent 6/21/2017  3:00 AM        Opportunity for questions and clarification was provided.       Patient transported with:   Monitor  Tech

## 2017-06-21 NOTE — DISCHARGE SUMMARY
[facility] Cardiology Discharge Summary     Patient ID:  Annabelle [place]  [id number]  42 y.o.  1970    Admit date: 6/19/2017    Discharge date:  6/21/2017    Admitting Physician: Jeronimo Peterson MD     Discharge Physician: WAYNE Gonzalez/Dr. Rebecca Alfaro    Admission Diagnoses: STEMI (ST elevation myocardial infarction) [facility])    Discharge Diagnoses:   Patient Active Problem List    Diagnosis Date Noted    Thrombocytopenia ([facility] [address] 06/20/2017    Gastrointestinal hemorrhage with hematemesis 06/20/2017    STEMI (ST elevation myocardial infarction) ([facility] [address] 06/19/2017    Type 2 diabetes mellitus without complication, without long-term current use of insulin ([facility] [address] 01/02/2017    Hyperlipidemia LDL goal <70     Chronic pain     Benign essential hypertension 08/03/2009    Diabetes ([facility] [address] 08/03/2009    GERD (gastroesophageal reflux disease) 08/03/2009       Cardiology Procedures this admission:  Left heart catheterization with PCI  EchoCardiogram  Consults: GI    Hospital Course: Patient presented to the emergency department of [facility] via EMS with complaints of chest pain. He had noted intermittent chest pain over the past several weeks that got worse the day of presentation. EMS was summoned. EKG showed ST elevation. STEMI was called and he was transported emergently to [facility]. He was taken to the cath lab for emergent cardiac catheterization. Culprit was found to be an occluded proximal RCA that was stented with a 5 x 28mm Ultra BMS and a 5 x 18mm Ultra BMS with 0% residual stenosis. He was noted to have an 80% stenosis of the mid-distal RCA that was stented with a 4 x 15mm Integrity BMS with 0% residual stenosis. He tolerated the procedure well. He was monitored closely in the CCU. An echocardiogram was performed with report as follows:   -  Left ventricle: The ventricle was mildly dilated. Systolic function was normal. Ejection fraction was estimated in the range of 50 % to 55 %.  There were no regional wall motion abnormalities. There was mild hypokinesis of the entire inferior wall(s). Wall thickness was at the upper limits of normal.  -  Right ventricle: The size was at the upper limits of normal.  -  Left atrium: The atrium was mildly dilated. -  Right atrium: The atrium was mildly dilated. -  Mitral valve: There was mild regurgitation. He developed severe thrombocytopenia on Reopro. He was transfused with 2 units of platelets. He had one episode of hematemesis. His Hgb remained stable. He was seen by GI. He was started on BID PPI. EGD was not felt warranted unless he had recurrent hematemesis or drop in Hgb. He felt well and denied any further chest pain. His home meds were held due to low BP. He was transferred to telemetry for further monitoring. His Hgb and platelet count remained stable. The morning of 6/21/2017 patient was up feeling well without any complaints of chest pain or shortness of breath. Patient's right groin cath site was clean, dry and intact without hematoma or bruit. Patient's labs were stable. Patient was seen and examined by Dr. Susanne Toscano and determined stable and ready for discharge. Patient was instructed on the importance of medication compliance including taking aspirin and Effient everyday without missing a dose. After receiving drug eluting stents, the patient will need to be on dual anti-platelet therapy for at least 1 month. For maximized medical therapy of CAD, patient will continue use of BB and statin as well. ARB was held during admission due to low BP. This may be resumed on outpatient basis if BP allows. The patient will have close transitional care follow up with [address] [place] [address] [address] Linda Camacho on June 26th at 10:00am [facility]) and has been referred to cardiac rehab. **He will have repeat CBC prior to follow up appointment. [facility] will [facility] as directed.      DISPOSITION: The patient is being discharged home in stable condition on a low saturated fat, low cholesterol and low salt diet. The patient is instructed to advance activities as tolerated to the limit of fatigue or shortness of breath. The patient is instructed to avoid all heavy lifting, straining, stooping or squatting for 3-5 days. The patient is instructed to watch the cath site for bleeding/oozing; if seen, the patient is instructed to apply firm pressure with a clean cloth and call [facility] at [phone]. The patient is instructed to watch for signs of infection which include: increasing area of redness, fever/hot to touch or purulent drainage at the catheterization site. The patient is instructed not to soak in a bathtub for 7-10 days, but is cleared to shower. The patient is instructed to call the office or return to the ER for immediate evaluation for any shortness of breath or chest pain not relieved by NTG. Discharge Exam:   Visit Vitals    /88 (BP 1 Location: Right arm, BP Patient Position: At rest)    Pulse 75    Temp 98.7 °F (37.1 °C)    Resp 16    Ht 6' 3\" (1.905 m)    Wt 105.9 kg (233 lb 8 oz)    SpO2 99%    BMI 29.19 kg/m2       Patient has been seen by Dr. Lissette Whipple: see his progress note for exam details.     Recent Results (from the past 24 hour(s))   GLUCOSE, POC    Collection Time: 06/20/17  8:38 AM   Result Value Ref Range    Glucose (POC) 97 65 - 100 mg/dL   GLUCOSE, POC    Collection Time: 06/20/17 11:43 AM   Result Value Ref Range    Glucose (POC) 98 65 - 100 mg/dL   CBC W/O DIFF    Collection Time: 06/20/17  1:01 PM   Result Value Ref Range    WBC 9.3 4.3 - 11.1 K/uL    RBC 5.08 4.23 - 5.67 M/uL    HGB 15.0 13.6 - 17.2 g/dL    HCT 44.8 41.1 - 50.3 %    MCV 88.2 79.6 - 97.8 FL    MCH 29.5 26.1 - 32.9 PG    MCHC 33.5 31.4 - 35.0 g/dL    RDW 14.3 11.9 - 14.6 %    PLATELET 68 (L) 397 - 450 K/uL    MPV 9.6 (L) 10.8 - 14.1 FL   GLUCOSE, POC    Collection Time: 06/20/17  4:54 PM   Result Value Ref Range    Glucose (POC) 83 65 - 100 mg/dL   CBC W/O DIFF    Collection Time: 06/20/17  7:02 PM   Result Value Ref Range    WBC 9.4 4.3 - 11.1 K/uL    RBC 4.98 4.23 - 5.67 M/uL    HGB 15.0 13.6 - 17.2 g/dL    HCT 43.4 41.1 - 50.3 %    MCV 87.1 79.6 - 97.8 FL    MCH 30.1 26.1 - 32.9 PG    MCHC 34.6 31.4 - 35.0 g/dL    RDW 14.2 11.9 - 14.6 %    PLATELET 66 (L) 020 - 450 K/uL    MPV 9.8 (L) 10.8 - 14.1 FL   GLUCOSE, POC    Collection Time: 06/20/17 10:08 PM   Result Value Ref Range    Glucose (POC) 156 (H) 65 - 758 mg/dL   METABOLIC PANEL, BASIC    Collection Time: 06/21/17  3:35 AM   Result Value Ref Range    Sodium 144 136 - 145 mmol/L    Potassium 3.5 3.5 - 5.1 mmol/L    Chloride 112 (H) 98 - 107 mmol/L    CO2 22 21 - 32 mmol/L    Anion gap 10 7 - 16 mmol/L    Glucose 81 65 - 100 mg/dL    BUN 14 6 - 23 MG/DL    Creatinine 1.06 0.8 - 1.5 MG/DL    GFR est AA >60 >60 ml/min/1.73m2    GFR est non-AA >60 >60 ml/min/1.73m2    Calcium 8.0 (L) 8.3 - 10.4 MG/DL   MAGNESIUM    Collection Time: 06/21/17  3:35 AM   Result Value Ref Range    Magnesium 2.3 1.8 - 2.4 mg/dL   CBC WITH AUTOMATED DIFF    Collection Time: 06/21/17  3:35 AM   Result Value Ref Range    WBC 8.6 4.3 - 11.1 K/uL    RBC 4.99 4.23 - 5.67 M/uL    HGB 14.6 13.6 - 17.2 g/dL    HCT 43.9 41.1 - 50.3 %    MCV 88.0 79.6 - 97.8 FL    MCH 29.3 26.1 - 32.9 PG    MCHC 33.3 31.4 - 35.0 g/dL    RDW 14.0 11.9 - 14.6 %    PLATELET 69 (L) 168 - 450 K/uL    MPV 10.0 (L) 10.8 - 14.1 FL    DF AUTOMATED      NEUTROPHILS 50 43 - 78 %    LYMPHOCYTES 33 13 - 44 %    MONOCYTES 12 4.0 - 12.0 %    EOSINOPHILS 4 0.5 - 7.8 %    BASOPHILS 1 0.0 - 2.0 %    IMMATURE GRANULOCYTES 0.2 0.0 - 5.0 %    ABS. NEUTROPHILS 4.4 1.7 - 8.2 K/UL    ABS. LYMPHOCYTES 2.9 0.5 - 4.6 K/UL    ABS. MONOCYTES 1.0 0.1 - 1.3 K/UL    ABS. EOSINOPHILS 0.3 0.0 - 0.8 K/UL    ABS. BASOPHILS 0.0 0.0 - 0.2 K/UL    ABS. IMM.  GRANS. 0.0 0.0 - 0.5 K/UL   GLUCOSE, POC    Collection Time: 06/21/17  7:27 AM   Result Value Ref Range    Glucose (POC) 83 65 - 100 mg/dL         Patient Instructions:   Current Discharge Medication List      START taking these medications    Details   aspirin 81 mg chewable tablet Take 1 Tab by mouth daily. prasugrel (EFFIENT) 10 mg tablet Take 1 Tab by mouth daily. Qty: 30 Tab, Refills: 6      pantoprazole (PROTONIX) 40 mg tablet Take 1 Tab by mouth Before breakfast and dinner. Qty: 60 Tab, Refills: 6      nitroglycerin (NITROSTAT) 0.4 mg SL tablet 1 Tab by SubLINGual route every five (5) minutes as needed for Chest Pain. Qty: 2 Bottle, Refills: 4         CONTINUE these medications which have CHANGED    Details   carvedilol (COREG) 3.125 mg tablet Take 1 Tab by mouth two (2) times daily (with meals). Qty: 60 Tab, Refills: 3    Associated Diagnoses: Benign essential hypertension      atorvastatin (LIPITOR) 80 mg tablet Take 1 Tab by mouth daily. Qty: 30 Tab, Refills: 11    Associated Diagnoses: Hyperlipidemia LDL goal <70         CONTINUE these medications which have NOT CHANGED    Details   raNITIdine (ZANTAC) 150 mg tablet Take 1 Tab by mouth two (2) times a day. furosemide (LASIX) 80 mg tablet Take 1 Tab by mouth daily. Qty: 30 Tab, Refills: 3    Associated Diagnoses: Benign essential hypertension      SITagliptin-metFORMIN (JANUMET XR) 100-1,000 mg TM24 Take 1 Tab by mouth daily. Qty: 30 Tab, Refills: 3    Associated Diagnoses: Type 2 diabetes mellitus without complication, without long-term current use of insulin ([facility])      canagliflozin (INVOKANA) 300 mg tablet Take 1 Tab by mouth Daily (before breakfast). Qty: 30 Tab, Refills: 3    Associated Diagnoses: Type 2 diabetes mellitus without complication, without long-term current use of insulin ([facility])      gabapentin (NEURONTIN) 300 mg capsule Take 1 Cap by mouth nightly. Qty: 30 Cap, Refills: 1    Associated Diagnoses: Other chronic pain      cetirizine (ZYRTEC) 10 mg tablet Take 1 Tab by mouth nightly.   Qty: 30 Tab, Refills: 0         STOP taking these medications losartan (COZAAR) 50 mg tablet Comments:   Reason for Stopping:         amLODIPine (NORVASC) 10 mg tablet Comments:   Reason for Stopping:                 Signed:  Elana Kraus PA-C  6/21/2017  8:34 AM

## 2017-06-21 NOTE — PROGRESS NOTES
Bedside shift change report given to Servnado Poole RN (oncoming nurse) by Max Sanchez RN (offgoing nurse). Report included the following information SBAR, Kardex, Procedure Summary, Intake/Output, MAR, Recent Results, Med Rec Status and Cardiac Rhythm of NSR.

## 2017-06-21 NOTE — PROGRESS NOTES
TRANSFER - IN REPORT:    Verbal report received from Martha Madison RN on Kaykay Betancur. being received from CCU for routine progression of care      Report consisted of patients Situation, Background, Assessment and Recommendations(SBAR). Information from the following report(s) SBAR, Kardex, Procedure Summary, Intake/Output, MAR and Recent Results was reviewed with the receiving nurse. Opportunity for questions and clarification was provided. Assessment completed upon patients arrival to unit and care assumed.

## 2017-06-21 NOTE — PROGRESS NOTES
[facility] CARDIOLOGY PROGRESS NOTE           6/21/2017 6:59 AM    Admit Date: 6/19/2017      Subjective:   Patient feels well. No chest pain or dyspnea. Platelet count stable. ROS:  Cardiovascular:  As noted above  Pulmonary:  No dyspnea      Objective:      Vitals:    06/20/17 2300 06/21/17 0300 06/21/17 0626 06/21/17 0730   BP: 119/66 108/69  128/88   Pulse: 65 63  75   Resp: 18 16  16   Temp: 98.9 °F (37.2 °C) 98.9 °F (37.2 °C)  98.7 °F (37.1 °C)   SpO2: 100% 100%  99%   Weight:   110.1 kg (242 lb 11.6 oz) 105.9 kg (233 lb 8 oz)   Height:    6' 3\" (1.905 m)       Physical Exam:  General-No Acute Distress  Neck- supple, no JVD  CV- regular rate and rhythm no MRG  Lung- clear bilaterally  Abd- soft, nontender, nondistended  Ext- no edema bilaterally. Right femoral venous sheath in place. Skin- warm and dry  Neur:  A and oreinted X3. No weakness         Data Review:   Recent Labs      06/21/17   0335  06/20/17   1902   06/20/17   0445   NA  144   --    --   147*   K  3.5   --    --   3.9   MG  2.3   --    --    --    BUN  14   --    --   15   CREA  1.06   --    --   1.21   GLU  81   --    --   93   WBC  8.6  9.4   < >   --    HGB  14.6  15.0   < >   --    HCT  43.9  43.4   < >   --    PLT  69*  66*   < >   --    TRIGL   --    --    --   82   HDL   --    --    --   32*    < > = values in this interval not displayed. No results found for: ANGELITO Sands    Assessment/Plan:     Principal Problem:    STEMI (ST elevation myocardial infarction) ([facility] [address] (6/19/2017)  S/P complex PCI of RCA. POD # 2. ASA and Effient. Active Problems:    Benign essential hypertension (8/3/2009)  BP low. Holding home medications. Coreg 3.125 mg BID. Diabetes ([facility] [address] (8/3/2009)  SSI. Hyperlipidemia LDL goal <70 ()  On Lipitor 80 mg PO QHS. Thrombocytopenia ([facility] [address] (6/20/2017)  Stable. Due to reopro. HIT panel pending. Will evaluate at follow up.        Gastrointestinal hemorrhage with hematemesis (6/20/2017)  No active bleeding with correction of thrombocytopenia. NO recurrence. Outpatient GI followup. Protonix 40 a day at discharge.                   Eden Pastor MD  6/21/2017 6:59 AM

## 2017-06-22 ENCOUNTER — PATIENT OUTREACH (OUTPATIENT)
Dept: CASE MANAGEMENT | Age: 47
End: 2017-06-22

## 2017-06-22 LAB
HEPARIN INDUCED PLT,XHIPA: NEGATIVE
HIT INTERPRETATION,XINTPR: NEGATIVE
HIT PROFILE,XHITT: NORMAL
OPTICAL DENSITY READ,XHITAO: 0.2 ABS

## 2017-06-22 NOTE — PROGRESS NOTES
This note will not be viewable in [address] [facility]  Initial outreach attempt unsuccessful. Left message for call back. Will attempt 2nd outreach within 24 hrs.

## 2017-06-23 ENCOUNTER — HOSPITAL ENCOUNTER (OUTPATIENT)
Dept: LAB | Age: 47
Discharge: HOME | End: 2017-06-23
Payer: MEDICARE

## 2017-06-23 ENCOUNTER — PATIENT OUTREACH (OUTPATIENT)
Dept: CASE MANAGEMENT | Age: 47
End: 2017-06-23

## 2017-06-23 LAB
BASOPHILS # BLD AUTO: 0 K/UL (ref 0–0.2)
BASOPHILS # BLD: 0 % (ref 0–2)
DIFFERENTIAL METHOD BLD: ABNORMAL
EOSINOPHIL # BLD: 0.2 K/UL (ref 0–0.8)
EOSINOPHIL NFR BLD: 3 % (ref 0.5–7.8)
ERYTHROCYTE [DISTWIDTH] IN BLOOD BY AUTOMATED COUNT: 13.7 % (ref 11.9–14.6)
HCT VFR BLD AUTO: 45.9 % (ref 41.1–50.3)
HGB BLD-MCNC: 16.1 G/DL (ref 13.6–17.2)
IMM GRANULOCYTES # BLD: 0 K/UL (ref 0–0.5)
IMM GRANULOCYTES NFR BLD AUTO: 0.3 % (ref 0–5)
LYMPHOCYTES # BLD AUTO: 25 % (ref 13–44)
LYMPHOCYTES # BLD: 2 K/UL (ref 0.5–4.6)
MCH RBC QN AUTO: 30 PG (ref 26.1–32.9)
MCHC RBC AUTO-ENTMCNC: 35.1 G/DL (ref 31.4–35)
MCV RBC AUTO: 85.6 FL (ref 79.6–97.8)
MONOCYTES # BLD: 0.6 K/UL (ref 0.1–1.3)
MONOCYTES NFR BLD AUTO: 7 % (ref 4–12)
NEUTS SEG # BLD: 5 K/UL (ref 1.7–8.2)
NEUTS SEG NFR BLD AUTO: 65 % (ref 43–78)
PLATELET # BLD AUTO: 102 K/UL (ref 150–450)
PMV BLD AUTO: 9.6 FL (ref 10.8–14.1)
RBC # BLD AUTO: 5.36 M/UL (ref 4.23–5.67)
WBC # BLD AUTO: 7.8 K/UL (ref 4.3–11.1)

## 2017-06-23 PROCEDURE — 36415 COLL VENOUS BLD VENIPUNCTURE: CPT | Performed by: PHYSICIAN ASSISTANT

## 2017-06-23 PROCEDURE — 85025 COMPLETE CBC W/AUTO DIFF WBC: CPT | Performed by: PHYSICIAN ASSISTANT

## 2017-06-23 NOTE — PROGRESS NOTES
This note will not be viewable in [address] [facility]  Left message to call back. 2nd outreach attempt unsuccessful. Will schedule 3rd outreach attempt within 5 business days.

## 2017-06-28 ENCOUNTER — PATIENT OUTREACH (OUTPATIENT)
Dept: CASE MANAGEMENT | Age: 47
End: 2017-06-28

## 2017-07-06 ENCOUNTER — APPOINTMENT (OUTPATIENT)
Dept: GENERAL RADIOLOGY | Age: 47
End: 2017-07-06
Attending: EMERGENCY MEDICINE
Payer: MEDICARE

## 2017-07-06 ENCOUNTER — HOSPITAL ENCOUNTER (EMERGENCY)
Age: 47
Discharge: HOME | End: 2017-07-06
Attending: STUDENT IN AN ORGANIZED HEALTH CARE EDUCATION/TRAINING PROGRAM
Payer: MEDICARE

## 2017-07-06 VITALS
SYSTOLIC BLOOD PRESSURE: 119 MMHG | TEMPERATURE: 98.5 F | HEIGHT: 75 IN | OXYGEN SATURATION: 98 % | RESPIRATION RATE: 16 BRPM | WEIGHT: 237 LBS | HEART RATE: 76 BPM | DIASTOLIC BLOOD PRESSURE: 86 MMHG | BODY MASS INDEX: 29.47 KG/M2

## 2017-07-06 DIAGNOSIS — R07.9 CHEST PAIN, UNSPECIFIED TYPE: Primary | ICD-10-CM

## 2017-07-06 DIAGNOSIS — R77.8 ELEVATED TROPONIN: ICD-10-CM

## 2017-07-06 LAB
ALBUMIN SERPL BCP-MCNC: 3.7 G/DL (ref 3.5–5)
ALBUMIN/GLOB SERPL: 0.9 {RATIO} (ref 1.2–3.5)
ALP SERPL-CCNC: 107 U/L (ref 50–136)
ALT SERPL-CCNC: 31 U/L (ref 12–65)
ANION GAP BLD CALC-SCNC: 8 MMOL/L (ref 7–16)
AST SERPL W P-5'-P-CCNC: 22 U/L (ref 15–37)
ATRIAL RATE: 75 BPM
BASOPHILS # BLD AUTO: 0.1 K/UL (ref 0–0.2)
BASOPHILS # BLD: 1 % (ref 0–2)
BILIRUB SERPL-MCNC: 0.4 MG/DL (ref 0.2–1.1)
BUN SERPL-MCNC: 17 MG/DL (ref 6–23)
CALCIUM SERPL-MCNC: 8.5 MG/DL (ref 8.3–10.4)
CALCULATED P AXIS, ECG09: 52 DEGREES
CALCULATED R AXIS, ECG10: -24 DEGREES
CALCULATED T AXIS, ECG11: -42 DEGREES
CHLORIDE SERPL-SCNC: 107 MMOL/L (ref 98–107)
CO2 SERPL-SCNC: 26 MMOL/L (ref 21–32)
CREAT SERPL-MCNC: 1.75 MG/DL (ref 0.8–1.5)
DIAGNOSIS, 93000: NORMAL
DIFFERENTIAL METHOD BLD: ABNORMAL
EOSINOPHIL # BLD: 0.3 K/UL (ref 0–0.8)
EOSINOPHIL NFR BLD: 3 % (ref 0.5–7.8)
ERYTHROCYTE [DISTWIDTH] IN BLOOD BY AUTOMATED COUNT: 13.7 % (ref 11.9–14.6)
GLOBULIN SER CALC-MCNC: 3.9 G/DL (ref 2.3–3.5)
GLUCOSE SERPL-MCNC: 60 MG/DL (ref 65–100)
HCT VFR BLD AUTO: 47.1 % (ref 41.1–50.3)
HGB BLD-MCNC: 16.2 G/DL (ref 13.6–17.2)
IMM GRANULOCYTES # BLD: 0 K/UL (ref 0–0.5)
IMM GRANULOCYTES NFR BLD AUTO: 0.2 % (ref 0–5)
LYMPHOCYTES # BLD AUTO: 38 % (ref 13–44)
LYMPHOCYTES # BLD: 3.7 K/UL (ref 0.5–4.6)
MCH RBC QN AUTO: 29.9 PG (ref 26.1–32.9)
MCHC RBC AUTO-ENTMCNC: 34.4 G/DL (ref 31.4–35)
MCV RBC AUTO: 87.1 FL (ref 79.6–97.8)
MONOCYTES # BLD: 0.6 K/UL (ref 0.1–1.3)
MONOCYTES NFR BLD AUTO: 6 % (ref 4–12)
NEUTS SEG # BLD: 5 K/UL (ref 1.7–8.2)
NEUTS SEG NFR BLD AUTO: 52 % (ref 43–78)
P-R INTERVAL, ECG05: 176 MS
PLATELET # BLD AUTO: 256 K/UL (ref 150–450)
PMV BLD AUTO: 8.9 FL (ref 10.8–14.1)
POTASSIUM SERPL-SCNC: 3.6 MMOL/L (ref 3.5–5.1)
PROT SERPL-MCNC: 7.6 G/DL (ref 6.3–8.2)
Q-T INTERVAL, ECG07: 380 MS
QRS DURATION, ECG06: 90 MS
QTC CALCULATION (BEZET), ECG08: 424 MS
RBC # BLD AUTO: 5.41 M/UL (ref 4.23–5.67)
SODIUM SERPL-SCNC: 141 MMOL/L (ref 136–145)
TROPONIN I BLD-MCNC: 0.12 NG/ML (ref 0–0.08)
TROPONIN I SERPL-MCNC: 0.69 NG/ML (ref 0.02–0.05)
TROPONIN I SERPL-MCNC: 0.74 NG/ML (ref 0.02–0.05)
VENTRICULAR RATE, ECG03: 75 BPM
WBC # BLD AUTO: 9.7 K/UL (ref 4.3–11.1)

## 2017-07-06 PROCEDURE — 84484 ASSAY OF TROPONIN QUANT: CPT | Performed by: EMERGENCY MEDICINE

## 2017-07-06 PROCEDURE — 74011250637 HC RX REV CODE- 250/637: Performed by: STUDENT IN AN ORGANIZED HEALTH CARE EDUCATION/TRAINING PROGRAM

## 2017-07-06 PROCEDURE — 80053 COMPREHEN METABOLIC PANEL: CPT | Performed by: EMERGENCY MEDICINE

## 2017-07-06 PROCEDURE — 93005 ELECTROCARDIOGRAM TRACING: CPT | Performed by: STUDENT IN AN ORGANIZED HEALTH CARE EDUCATION/TRAINING PROGRAM

## 2017-07-06 PROCEDURE — 99285 EMERGENCY DEPT VISIT HI MDM: CPT | Performed by: STUDENT IN AN ORGANIZED HEALTH CARE EDUCATION/TRAINING PROGRAM

## 2017-07-06 PROCEDURE — 93005 ELECTROCARDIOGRAM TRACING: CPT | Performed by: EMERGENCY MEDICINE

## 2017-07-06 PROCEDURE — 71020 XR CHEST PA LAT: CPT

## 2017-07-06 PROCEDURE — 85025 COMPLETE CBC W/AUTO DIFF WBC: CPT | Performed by: EMERGENCY MEDICINE

## 2017-07-06 RX ORDER — GUAIFENESIN 100 MG/5ML
324 LIQUID (ML) ORAL
Status: COMPLETED | OUTPATIENT
Start: 2017-07-06 | End: 2017-07-06

## 2017-07-06 RX ADMIN — ASPIRIN 81 MG 324 MG: 81 TABLET ORAL at 18:15

## 2017-07-06 NOTE — Clinical Note
Please arrange follow-up with your primary cardiologist as discussed. Return immediately if your symptoms begin to worsen or you have any concerns or questions.

## 2017-07-06 NOTE — ED TRIAGE NOTES
Patient complains of sharp pains to left side of chest that began today. States he had stents placed 2 weeks ago.

## 2017-07-06 NOTE — ED PROVIDER NOTES
Patient is a 42 y.o. male presenting with chest pain. The history is provided by the patient. No  was used. Chest Pain (Angina)    This is a new problem. The current episode started 3 to 5 hours ago. The problem has not changed since onset. The problem occurs constantly. The pain is associated with normal activity. The pain is present in the left side. The pain is at a severity of 3/10. The pain is mild. The quality of the pain is described as intermittent and sharp. The pain does not radiate. Associated symptoms include back pain. Pertinent negatives include no abdominal pain, no claudication, no cough, no diaphoresis, no dizziness, no exertional chest pressure, no fever, no headaches, no hemoptysis, no irregular heartbeat, no leg pain, no lower extremity edema, no malaise/fatigue, no nausea, no near-syncope, no numbness, no orthopnea, no palpitations, no PND, no shortness of breath, no sputum production, no vomiting and no weakness. He has tried nothing for the symptoms. The treatment provided no relief. Risk factors include cardiac disease, dyslipidemia, smoking/tobacco exposure, male gender and hypertension. His past medical history is significant for HTN. Procedural history includes cardiac catheterization, echocardiogram and cardiac stents.        Past Medical History:   Diagnosis Date    Arthritis     Benign essential hypertension     CAD (coronary artery disease)     MI in 12/07, 6/2017    Chronic pain     Dilated cardiomyopathy (HCC)     GERD (gastroesophageal reflux disease)     Heart failure (HCC)     Hyperlipidemia LDL goal <70     NIDDM (non-insulin dependent diabetes mellitus) 8/3/2009    Psychiatric disorder     depression and anxiety    Renal insufficiency        Past Surgical History:   Procedure Laterality Date    CARDIAC SURG PROCEDURE UNLIST      cardiac cath    HX CORONARY STENT PLACEMENT  06/2017    RCA    HX ORTHOPAEDIC      right femur ORIF surg    HX WISDOM TEETH EXTRACTION           Family History:   Problem Relation Age of Onset    Heart Disease Father     Hypertension Father     Diabetes Mother     Hypertension Mother        Social History     Social History    Marital status: SINGLE     Spouse name: N/A    Number of children: N/A    Years of education: N/A     Occupational History    Cook Retired     Disability     Social History Main Topics    Smoking status: Former Smoker     Packs/day: 0.25     Types: Cigarettes     Quit date: 6/19/2017    Smokeless tobacco: Never Used    Alcohol use No    Drug use: No      Comment: occasional; quit cocainein 2007 - used for 10 years    Sexual activity: Not on file     Other Topics Concern    Not on file     Social History Narrative         ALLERGIES: Lisinopril; Ragweed; and Reopro [abciximab]    Review of Systems   Constitutional: Negative for chills, diaphoresis, fever and malaise/fatigue. HENT: Negative for congestion, sneezing and sore throat. Eyes: Negative for visual disturbance. Respiratory: Negative for cough, hemoptysis, sputum production, chest tightness, shortness of breath and wheezing. Cardiovascular: Positive for chest pain. Negative for palpitations, orthopnea, claudication, leg swelling, PND and near-syncope. Gastrointestinal: Negative for abdominal pain, blood in stool, diarrhea, nausea and vomiting. Endocrine: Negative for polyuria. Genitourinary: Negative for difficulty urinating, dysuria, flank pain, hematuria and urgency. Musculoskeletal: Positive for back pain. Negative for myalgias, neck pain and neck stiffness. Skin: Negative for color change and rash. Neurological: Negative for dizziness, syncope, speech difficulty, weakness, light-headedness, numbness and headaches. Psychiatric/Behavioral: Negative for behavioral problems. All other systems reviewed and are negative.       Vitals:    07/06/17 1709   BP: 104/79   Pulse: 76   Resp: 16   Temp: 98.3 °F (36.8 °C) SpO2: 97%   Weight: 107.5 kg (237 lb)   Height: 6' 3\" (1.905 m)            Physical Exam   Constitutional: He is oriented to person, place, and time. He appears well-developed and well-nourished. No distress. Alert and oriented to person, place and time. No acute distress. Speaks in clear, fluent sentences. HENT:   Head: Normocephalic and atraumatic. Nose: Nose normal.   Eyes: Conjunctivae and EOM are normal. Pupils are equal, round, and reactive to light. Neck: Normal range of motion. Neck supple. No JVD present. No tracheal deviation present. Cardiovascular: Normal rate, regular rhythm, S1 normal, S2 normal, normal heart sounds and intact distal pulses. Exam reveals no gallop, no distant heart sounds and no friction rub. No murmur heard. Pulmonary/Chest: Effort normal and breath sounds normal. No accessory muscle usage or stridor. No tachypnea and no bradypnea. No respiratory distress. He has no decreased breath sounds. He has no wheezes. He has no rhonchi. He has no rales. He exhibits no tenderness. Abdominal: Soft. Normal appearance. He exhibits no distension and no mass. There is no hepatosplenomegaly, splenomegaly or hepatomegaly. There is no tenderness. There is no rigidity, no rebound, no guarding, no CVA tenderness, no tenderness at McBurney's point and negative Rayo's sign. Musculoskeletal: Normal range of motion. He exhibits no edema, tenderness or deformity. Neurological: He is alert and oriented to person, place, and time. No cranial nerve deficit. Skin: Skin is warm and dry. No rash noted. He is not diaphoretic. Psychiatric: He has a normal mood and affect. His behavior is normal.   Nursing note and vitals reviewed. MDM  Number of Diagnoses or Management Options  Diagnosis management comments: EKG obtained on arrival shows a normal sinus rhythm with rate of 75 beats a minute. There are deep T-wave inversions in leads 2 and 3 aVF.   T-wave inversions in V5 and 6 as well.  T waves appear slightly peaked in V2. There are no recent EKGs viewable and patient's chart aside from a tracing obtained in June 2014. Due to the changes listed above is new since this tracing. Of note patient recently underwent cardiac stent placement for an inferior wall MI on 6/19/2017. Elevated troponin noted. Spoke with Dr. Lali Tijerina of the on-call cardiology service who performed patient's calf on 6/20/2017. Discussed EKG findings and elevation of patient's troponin, Dr. Zaid Sarabia agrees that this is likely the progression of the patient's known inferior wall STEMI, recommends observation and repeat 3 hour troponin.   Repeat EKG appears unchanged with comparison to EKG obtained on arrival.       Amount and/or Complexity of Data Reviewed  Clinical lab tests: ordered and reviewed  Tests in the radiology section of CPT®: ordered and reviewed  Tests in the medicine section of CPT®: ordered and reviewed  Independent visualization of images, tracings, or specimens: yes    Risk of Complications, Morbidity, and/or Mortality  Presenting problems: moderate  Diagnostic procedures: low  Management options: moderate    Patient Progress  Patient progress: stable    ED Course       Procedures

## 2017-07-07 LAB
ATRIAL RATE: 74 BPM
CALCULATED P AXIS, ECG09: 60 DEGREES
CALCULATED R AXIS, ECG10: -28 DEGREES
CALCULATED T AXIS, ECG11: -44 DEGREES
DIAGNOSIS, 93000: NORMAL
P-R INTERVAL, ECG05: 178 MS
Q-T INTERVAL, ECG07: 366 MS
QRS DURATION, ECG06: 88 MS
QTC CALCULATION (BEZET), ECG08: 406 MS
VENTRICULAR RATE, ECG03: 74 BPM

## 2017-07-07 NOTE — DISCHARGE INSTRUCTIONS
Chest Pain: Care Instructions  Your Care Instructions  There are many things that can cause chest pain. Some are not serious and will get better on their own in a few days. But some kinds of chest pain need more testing and treatment. Your doctor may have recommended a follow-up visit in the next 8 to 12 hours. If you are not getting better, you may need more tests or treatment. Even though your doctor has released you, you still need to watch for any problems. The doctor carefully checked you, but sometimes problems can develop later. If you have new symptoms or if your symptoms do not get better, get medical care right away. If you have worse or different chest pain or pressure that lasts more than 5 minutes or you passed out (lost consciousness), call 911 or seek other emergency help right away. A medical visit is only one step in your treatment. Even if you feel better, you still need to do what your doctor recommends, such as going to all suggested follow-up appointments and taking medicines exactly as directed. This will help you recover and help prevent future problems. How can you care for yourself at home? · Rest until you feel better. · Take your medicine exactly as prescribed. Call your doctor if you think you are having a problem with your medicine. · Do not drive after taking a prescription pain medicine. When should you call for help? Call 911 if:  · You passed out (lost consciousness). · You have severe difficulty breathing. · You have symptoms of a heart attack. These may include:  ¨ Chest pain or pressure, or a strange feeling in your chest.  ¨ Sweating. ¨ Shortness of breath. ¨ Nausea or vomiting. ¨ Pain, pressure, or a strange feeling in your back, neck, jaw, or upper belly or in one or both shoulders or arms. ¨ Lightheadedness or sudden weakness. ¨ A fast or irregular heartbeat.   After you call 911, the  may tell you to chew 1 adult-strength or 2 to 4 low-dose aspirin. Wait for an ambulance. Do not try to drive yourself. Call your doctor today if:  · You have any trouble breathing. · Your chest pain gets worse. · You are dizzy or lightheaded, or you feel like you may faint. · You are not getting better as expected. · You are having new or different chest pain. Where can you learn more? Go to http://delroy-bety.info/. Enter A120 in the search box to learn more about \"Chest Pain: Care Instructions. \"  Current as of: March 20, 2017  Content Version: 11.3  © 1028-0900 [employer]. Care instructions adapted under license by [employer] (which disclaims liability or warranty for this information). If you have questions about a medical condition or this instruction, always ask your healthcare professional. [address] any warranty or liability for your use of this information.

## 2017-08-01 ENCOUNTER — HOSPITAL ENCOUNTER (OUTPATIENT)
Dept: CARDIAC REHAB | Age: 47
Discharge: HOME | End: 2017-08-01

## 2017-08-08 ENCOUNTER — HOSPITAL ENCOUNTER (OUTPATIENT)
Dept: CARDIAC REHAB | Age: 47
Discharge: HOME | End: 2017-08-08
Payer: MEDICARE

## 2017-08-08 VITALS — HEIGHT: 75 IN | BODY MASS INDEX: 30.04 KG/M2 | WEIGHT: 241.6 LBS

## 2017-08-08 PROCEDURE — 93798 PHYS/QHP OP CAR RHAB W/ECG: CPT

## 2017-08-10 ENCOUNTER — HOSPITAL ENCOUNTER (OUTPATIENT)
Dept: CARDIAC REHAB | Age: 47
Discharge: HOME | End: 2017-08-10
Payer: MEDICARE

## 2017-08-10 PROCEDURE — 93798 PHYS/QHP OP CAR RHAB W/ECG: CPT

## 2017-08-15 ENCOUNTER — APPOINTMENT (OUTPATIENT)
Dept: CARDIAC REHAB | Age: 47
End: 2017-08-15
Payer: MEDICARE

## 2017-08-17 ENCOUNTER — APPOINTMENT (OUTPATIENT)
Dept: CARDIAC REHAB | Age: 47
End: 2017-08-17
Payer: MEDICARE

## 2017-08-22 ENCOUNTER — HOSPITAL ENCOUNTER (OUTPATIENT)
Dept: CARDIAC REHAB | Age: 47
End: 2017-08-22
Payer: MEDICARE

## 2017-08-24 ENCOUNTER — HOSPITAL ENCOUNTER (OUTPATIENT)
Dept: CARDIAC REHAB | Age: 47
End: 2017-08-24
Payer: MEDICARE

## 2017-08-29 ENCOUNTER — APPOINTMENT (OUTPATIENT)
Dept: CARDIAC REHAB | Age: 47
End: 2017-08-29
Payer: MEDICARE

## 2017-08-31 ENCOUNTER — APPOINTMENT (OUTPATIENT)
Dept: CARDIAC REHAB | Age: 47
End: 2017-08-31
Payer: MEDICARE

## 2017-12-20 ENCOUNTER — HOSPITAL ENCOUNTER (EMERGENCY)
Age: 47
Discharge: HOME | End: 2017-12-20
Attending: EMERGENCY MEDICINE
Payer: MEDICARE

## 2017-12-20 VITALS
HEIGHT: 75 IN | RESPIRATION RATE: 20 BRPM | SYSTOLIC BLOOD PRESSURE: 134 MMHG | BODY MASS INDEX: 31.08 KG/M2 | HEART RATE: 80 BPM | TEMPERATURE: 98.4 F | DIASTOLIC BLOOD PRESSURE: 78 MMHG | OXYGEN SATURATION: 96 % | WEIGHT: 250 LBS

## 2017-12-20 DIAGNOSIS — K21.9 GASTROESOPHAGEAL REFLUX DISEASE WITHOUT ESOPHAGITIS: Primary | ICD-10-CM

## 2017-12-20 LAB
ALBUMIN SERPL-MCNC: 3.7 G/DL (ref 3.5–5)
ALBUMIN/GLOB SERPL: 0.9 {RATIO} (ref 1.2–3.5)
ALP SERPL-CCNC: 111 U/L (ref 50–136)
ALT SERPL-CCNC: 37 U/L (ref 12–65)
ANION GAP SERPL CALC-SCNC: 13 MMOL/L (ref 7–16)
AST SERPL-CCNC: 25 U/L (ref 15–37)
ATRIAL RATE: 71 BPM
BASOPHILS # BLD: 0 K/UL (ref 0–0.2)
BASOPHILS NFR BLD: 1 % (ref 0–2)
BILIRUB SERPL-MCNC: 0.5 MG/DL (ref 0.2–1.1)
BUN SERPL-MCNC: 13 MG/DL (ref 6–23)
CALCIUM SERPL-MCNC: 8.9 MG/DL (ref 8.3–10.4)
CALCULATED P AXIS, ECG09: 57 DEGREES
CALCULATED R AXIS, ECG10: -19 DEGREES
CALCULATED T AXIS, ECG11: -11 DEGREES
CHLORIDE SERPL-SCNC: 111 MMOL/L (ref 98–107)
CO2 SERPL-SCNC: 20 MMOL/L (ref 21–32)
CREAT SERPL-MCNC: 1.29 MG/DL (ref 0.8–1.5)
DIAGNOSIS, 93000: NORMAL
DIFFERENTIAL METHOD BLD: ABNORMAL
EOSINOPHIL # BLD: 0.3 K/UL (ref 0–0.8)
EOSINOPHIL NFR BLD: 4 % (ref 0.5–7.8)
ERYTHROCYTE [DISTWIDTH] IN BLOOD BY AUTOMATED COUNT: 14.2 % (ref 11.9–14.6)
GLOBULIN SER CALC-MCNC: 3.9 G/DL (ref 2.3–3.5)
GLUCOSE SERPL-MCNC: 159 MG/DL (ref 65–100)
HCT VFR BLD AUTO: 49.7 % (ref 41.1–50.3)
HGB BLD-MCNC: 16.8 G/DL (ref 13.6–17.2)
IMM GRANULOCYTES # BLD: 0 K/UL (ref 0–0.5)
IMM GRANULOCYTES NFR BLD AUTO: 0 % (ref 0–5)
LIPASE SERPL-CCNC: 260 U/L (ref 73–393)
LYMPHOCYTES # BLD: 2.9 K/UL (ref 0.5–4.6)
LYMPHOCYTES NFR BLD: 34 % (ref 13–44)
MCH RBC QN AUTO: 30.3 PG (ref 26.1–32.9)
MCHC RBC AUTO-ENTMCNC: 33.8 G/DL (ref 31.4–35)
MCV RBC AUTO: 89.5 FL (ref 79.6–97.8)
MONOCYTES # BLD: 0.5 K/UL (ref 0.1–1.3)
MONOCYTES NFR BLD: 6 % (ref 4–12)
NEUTS SEG # BLD: 4.7 K/UL (ref 1.7–8.2)
NEUTS SEG NFR BLD: 55 % (ref 43–78)
P-R INTERVAL, ECG05: 194 MS
PLATELET # BLD AUTO: 206 K/UL (ref 150–450)
PMV BLD AUTO: 9.3 FL (ref 10.8–14.1)
POTASSIUM SERPL-SCNC: 4.1 MMOL/L (ref 3.5–5.1)
PROT SERPL-MCNC: 7.6 G/DL (ref 6.3–8.2)
Q-T INTERVAL, ECG07: 376 MS
QRS DURATION, ECG06: 90 MS
QTC CALCULATION (BEZET), ECG08: 408 MS
RBC # BLD AUTO: 5.55 M/UL (ref 4.23–5.67)
SODIUM SERPL-SCNC: 144 MMOL/L (ref 136–145)
TROPONIN I BLD-MCNC: 0.01 NG/ML (ref 0.02–0.05)
VENTRICULAR RATE, ECG03: 71 BPM
WBC # BLD AUTO: 8.4 K/UL (ref 4.3–11.1)

## 2017-12-20 PROCEDURE — 83690 ASSAY OF LIPASE: CPT | Performed by: EMERGENCY MEDICINE

## 2017-12-20 PROCEDURE — 84484 ASSAY OF TROPONIN QUANT: CPT

## 2017-12-20 PROCEDURE — 85025 COMPLETE CBC W/AUTO DIFF WBC: CPT | Performed by: EMERGENCY MEDICINE

## 2017-12-20 PROCEDURE — 80053 COMPREHEN METABOLIC PANEL: CPT | Performed by: EMERGENCY MEDICINE

## 2017-12-20 PROCEDURE — 93005 ELECTROCARDIOGRAM TRACING: CPT | Performed by: NURSE PRACTITIONER

## 2017-12-20 PROCEDURE — 99284 EMERGENCY DEPT VISIT MOD MDM: CPT | Performed by: NURSE PRACTITIONER

## 2017-12-20 NOTE — DISCHARGE INSTRUCTIONS

## 2017-12-20 NOTE — ED TRIAGE NOTES
Pt states upper left abdominal pain and vomiting since Saturday. Pt states the pain woke him up around 03:30am this morning.

## 2017-12-20 NOTE — ED NOTES
I have reviewed discharge instructions with the patient. The patient verbalized understanding. Patient left ED via Discharge Method: ambulatory to Home with (insert name of family/friend, self,). Opportunity for questions and clarification provided. Patient given 0 scripts. To continue your aftercare when you leave the hospital, you may receive an automated call from our care team to check in on how you are doing. This is a free service and part of our promise to provide the best care and service to meet your aftercare needs.  If you have questions, or wish to unsubscribe from this service please call [phone]. Thank you for Choosing our [facility] Emergency Department.

## 2017-12-20 NOTE — ED PROVIDER NOTES
HPI Comments: Patient presents with epigastric pain that started 3 days ago. He states pain has been a chronic pain after eating but is usually relieved by Zantac 150 and dietary restrictions. He states 3 days ago pain changed and has been waking him from his sleep. He states nausea and vomiting this morning approx 0330. He denies shortness of breath, chest pain, and currently denies abdominal pain. He states he is concerned about his heart. Patient is a 41 y.o. male presenting with abdominal pain. The history is provided by the patient. Abdominal Pain    This is a new problem. The current episode started more than 2 days ago. The problem occurs constantly. The problem has not changed since onset. The pain is associated with vomiting. The pain is located in the epigastric region. The quality of the pain is burning. The patient is experiencing no pain. Associated symptoms include nausea and vomiting. Pertinent negatives include no anorexia, no fever, no belching, no diarrhea, no flatus, no hematochezia, no melena, no constipation, no dysuria, no frequency, no hematuria, no headaches, no arthralgias, no myalgias, no trauma, no chest pain, no testicular pain and no back pain. The pain is worsened by eating. The pain is relieved by nothing. His past medical history is significant for GERD.         Past Medical History:   Diagnosis Date    Arthritis     Benign essential hypertension     CAD (coronary artery disease)     MI in 12/07, 6/2017    Chronic pain     Dilated cardiomyopathy (HCC)     GERD (gastroesophageal reflux disease)     Heart failure (HCC)     Hyperlipidemia LDL goal <70     NIDDM (non-insulin dependent diabetes mellitus) 8/3/2009    Psychiatric disorder     depression and anxiety    Renal insufficiency        Past Surgical History:   Procedure Laterality Date    HX CORONARY STENT PLACEMENT  06/2017    RCA    HX ORTHOPAEDIC      right femur ORIF surg    HX WISDOM TEETH EXTRACTION      [place] LEFT HEART CATH,PERCUTANEOUS  06/19/2017    STEMI & PCI         Family History:   Problem Relation Age of Onset    Heart Disease Father     Hypertension Father     Diabetes Mother     Hypertension Mother        Social History     Social History    Marital status: SINGLE     Spouse name: N/A    Number of children: N/A    Years of education: N/A     Occupational History    [employer] Retired     Disability     Social History Main Topics    Smoking status: Former Smoker     Packs/day: 0.25     Years: 29.00     Types: Cigarettes     Quit date: 6/19/2017    Smokeless tobacco: Never Used    Alcohol use No    Drug use: No      Comment: occasional; quit cocainein 2007 - used for 10 years    Sexual activity: Not on file     Other Topics Concern    Not on file     Social History Narrative         ALLERGIES: Lisinopril; Ragweed; and Reopro [abciximab]    Review of Systems   Constitutional: Negative for chills and fever. Respiratory: Negative for cough and shortness of breath. Cardiovascular: Negative for chest pain. Gastrointestinal: Positive for abdominal pain, nausea and vomiting. Negative for anorexia, constipation, diarrhea, flatus, hematochezia and melena. Genitourinary: Negative for dysuria, frequency, hematuria and testicular pain. Musculoskeletal: Negative for arthralgias, back pain and myalgias. Skin: Negative for color change and wound. Neurological: Negative for dizziness, weakness and headaches. Vitals:    12/20/17 0720   BP: 148/78   Pulse: 80   Resp: 20   Temp: 98.4 °F (36.9 °C)   SpO2: 96%   Weight: 113.4 kg (250 lb)   Height: 6' 3\" (1.905 m)            Physical Exam   Constitutional: He is oriented to person, place, and time. He appears well-developed and well-nourished. No distress. Cardiovascular: Normal rate, regular rhythm and normal heart sounds. No murmur heard. Pulmonary/Chest: Effort normal and breath sounds normal. No respiratory distress. He has no wheezes. Abdominal: Soft. Bowel sounds are normal. He exhibits no distension. There is no tenderness. Unable to reproduce epigastric pain. Musculoskeletal: Normal range of motion. Neurological: He is alert and oriented to person, place, and time. Skin: Skin is warm and dry. He is not diaphoretic. Psychiatric: He has a normal mood and affect. His behavior is normal.   Nursing note and vitals reviewed. Recent Results (from the past 12 hour(s))   CBC WITH AUTOMATED DIFF    Collection Time: 12/20/17  7:26 AM   Result Value Ref Range    WBC 8.4 4.3 - 11.1 K/uL    RBC 5.55 4.23 - 5.67 M/uL    HGB 16.8 13.6 - 17.2 g/dL    HCT 49.7 41.1 - 50.3 %    MCV 89.5 79.6 - 97.8 FL    MCH 30.3 26.1 - 32.9 PG    MCHC 33.8 31.4 - 35.0 g/dL    RDW 14.2 11.9 - 14.6 %    PLATELET 996 737 - 075 K/uL    MPV 9.3 (L) 10.8 - 14.1 FL    DF AUTOMATED      NEUTROPHILS 55 43 - 78 %    LYMPHOCYTES 34 13 - 44 %    MONOCYTES 6 4.0 - 12.0 %    EOSINOPHILS 4 0.5 - 7.8 %    BASOPHILS 1 0.0 - 2.0 %    IMMATURE GRANULOCYTES 0 0.0 - 5.0 %    ABS. NEUTROPHILS 4.7 1.7 - 8.2 K/UL    ABS. LYMPHOCYTES 2.9 0.5 - 4.6 K/UL    ABS. MONOCYTES 0.5 0.1 - 1.3 K/UL    ABS. EOSINOPHILS 0.3 0.0 - 0.8 K/UL    ABS. BASOPHILS 0.0 0.0 - 0.2 K/UL    ABS. IMM. GRANS. 0.0 0.0 - 0.5 K/UL   METABOLIC PANEL, COMPREHENSIVE    Collection Time: 12/20/17  7:26 AM   Result Value Ref Range    Sodium 144 136 - 145 mmol/L    Potassium 4.1 3.5 - 5.1 mmol/L    Chloride 111 (H) 98 - 107 mmol/L    CO2 20 (L) 21 - 32 mmol/L    Anion gap 13 7 - 16 mmol/L    Glucose 159 (H) 65 - 100 mg/dL    BUN 13 6 - 23 MG/DL    Creatinine 1.29 0.8 - 1.5 MG/DL    GFR est AA >60 >60 ml/min/1.73m2    GFR est non-AA >60 >60 ml/min/1.73m2    Calcium 8.9 8.3 - 10.4 MG/DL    Bilirubin, total 0.5 0.2 - 1.1 MG/DL    ALT (SGPT) 37 12 - 65 U/L    AST (SGOT) 25 15 - 37 U/L    Alk.  phosphatase 111 50 - 136 U/L    Protein, total 7.6 6.3 - 8.2 g/dL    Albumin 3.7 3.5 - 5.0 g/dL    Globulin 3.9 (H) 2.3 - 3.5 g/dL A-G Ratio 0.9 (L) 1.2 - 3.5     LIPASE    Collection Time: 12/20/17  7:26 AM   Result Value Ref Range    Lipase 260 73 - 393 U/L   EKG, 12 LEAD, INITIAL    Collection Time: 12/20/17  9:21 AM   Result Value Ref Range    Ventricular Rate 71 BPM    Atrial Rate 71 BPM    P-R Interval 194 ms    QRS Duration 90 ms    Q-T Interval 376 ms    QTC Calculation (Bezet) 408 ms    Calculated P Axis 57 degrees    Calculated R Axis -19 degrees    Calculated T Axis -11 degrees    Diagnosis       Normal sinus rhythm with sinus arrhythmia  Normal ECG  When compared with ECG of 06-JUL-2017 20:45,  T wave inversion less evident in Inferior leads  T wave inversion no longer evident in Lateral leads     POC TROPONIN-I    Collection Time: 12/20/17  9:32 AM   Result Value Ref Range    Troponin-I (POC) 0.01 (L) 0.02 - 0.05 ng/ml     9:30 AM-discussed patient with Dr. García Berrios. ECG and lab results that are available also reviewed. MDM  Number of Diagnoses or Management Options  Gastroesophageal reflux disease without esophagitis:   Diagnosis management comments: No acute findings noted on lab results. AST and ALT show no concerns for cholecystitis. ECG negative for acute MI. No need for radiology studies at this time.         Amount and/or Complexity of Data Reviewed  Clinical lab tests: ordered and reviewed  Discuss the patient with other providers: yes (zavala)    Patient Progress  Patient progress: stable    ED Course       Procedures

## 2019-05-02 PROBLEM — K92.0 GASTROINTESTINAL HEMORRHAGE WITH HEMATEMESIS: Status: RESOLVED | Noted: 2017-06-20 | Resolved: 2019-05-02

## 2019-11-05 PROBLEM — I25.10 ASCVD (ARTERIOSCLEROTIC CARDIOVASCULAR DISEASE): Status: ACTIVE | Noted: 2019-11-05

## 2020-07-11 ENCOUNTER — HOSPITAL ENCOUNTER (EMERGENCY)
Age: 50
Discharge: HOME | End: 2020-07-11
Attending: EMERGENCY MEDICINE
Payer: MEDICARE

## 2020-07-11 VITALS
HEART RATE: 88 BPM | RESPIRATION RATE: 18 BRPM | DIASTOLIC BLOOD PRESSURE: 81 MMHG | TEMPERATURE: 98.6 F | BODY MASS INDEX: 30.29 KG/M2 | WEIGHT: 236 LBS | OXYGEN SATURATION: 100 % | HEIGHT: 74 IN | SYSTOLIC BLOOD PRESSURE: 127 MMHG

## 2020-07-11 DIAGNOSIS — K52.9 GASTROENTERITIS, ACUTE: Primary | ICD-10-CM

## 2020-07-11 LAB
ALBUMIN SERPL-MCNC: 3.8 G/DL (ref 3.5–5)
ALBUMIN/GLOB SERPL: 0.9 {RATIO} (ref 1.2–3.5)
ALP SERPL-CCNC: 99 U/L (ref 50–136)
ALT SERPL-CCNC: 27 U/L (ref 12–65)
ANION GAP SERPL CALC-SCNC: 8 MMOL/L (ref 7–16)
AST SERPL-CCNC: 14 U/L (ref 15–37)
BACTERIA URNS QL MICRO: 0 /HPF
BASOPHILS # BLD: 0 K/UL (ref 0–0.2)
BASOPHILS NFR BLD: 1 % (ref 0–2)
BILIRUB SERPL-MCNC: 0.5 MG/DL (ref 0.2–1.1)
BUN SERPL-MCNC: 14 MG/DL (ref 6–23)
CALCIUM SERPL-MCNC: 8.8 MG/DL (ref 8.3–10.4)
CASTS URNS QL MICRO: NORMAL /LPF
CHLORIDE SERPL-SCNC: 111 MMOL/L (ref 98–107)
CO2 SERPL-SCNC: 24 MMOL/L (ref 21–32)
CREAT SERPL-MCNC: 1.33 MG/DL (ref 0.8–1.5)
DIFFERENTIAL METHOD BLD: ABNORMAL
EOSINOPHIL # BLD: 0.2 K/UL (ref 0–0.8)
EOSINOPHIL NFR BLD: 2 % (ref 0.5–7.8)
EPI CELLS #/AREA URNS HPF: 0 /HPF
ERYTHROCYTE [DISTWIDTH] IN BLOOD BY AUTOMATED COUNT: 13.8 % (ref 11.9–14.6)
GLOBULIN SER CALC-MCNC: 4.1 G/DL (ref 2.3–3.5)
GLUCOSE SERPL-MCNC: 117 MG/DL (ref 65–100)
HCT VFR BLD AUTO: 51.6 % (ref 41.1–50.3)
HGB BLD-MCNC: 16.4 G/DL (ref 13.6–17.2)
IMM GRANULOCYTES # BLD AUTO: 0 K/UL (ref 0–0.5)
IMM GRANULOCYTES NFR BLD AUTO: 0 % (ref 0–5)
LIPASE SERPL-CCNC: 78 U/L (ref 73–393)
LYMPHOCYTES # BLD: 1.9 K/UL (ref 0.5–4.6)
LYMPHOCYTES NFR BLD: 23 % (ref 13–44)
MCH RBC QN AUTO: 29.2 PG (ref 26.1–32.9)
MCHC RBC AUTO-ENTMCNC: 31.8 G/DL (ref 31.4–35)
MCV RBC AUTO: 91.8 FL (ref 79.6–97.8)
MONOCYTES # BLD: 1 K/UL (ref 0.1–1.3)
MONOCYTES NFR BLD: 12 % (ref 4–12)
NEUTS SEG # BLD: 5.2 K/UL (ref 1.7–8.2)
NEUTS SEG NFR BLD: 63 % (ref 43–78)
NRBC # BLD: 0 K/UL (ref 0–0.2)
PLATELET # BLD AUTO: 191 K/UL (ref 150–450)
PMV BLD AUTO: 8.7 FL (ref 9.4–12.3)
POTASSIUM SERPL-SCNC: 3.4 MMOL/L (ref 3.5–5.1)
PROT SERPL-MCNC: 7.9 G/DL (ref 6.3–8.2)
RBC # BLD AUTO: 5.62 M/UL (ref 4.23–5.6)
RBC #/AREA URNS HPF: NORMAL /HPF
SODIUM SERPL-SCNC: 143 MMOL/L (ref 136–145)
WBC # BLD AUTO: 8.2 K/UL (ref 4.3–11.1)
WBC URNS QL MICRO: 0 /HPF

## 2020-07-11 PROCEDURE — 80053 COMPREHEN METABOLIC PANEL: CPT

## 2020-07-11 PROCEDURE — 81015 MICROSCOPIC EXAM OF URINE: CPT

## 2020-07-11 PROCEDURE — 99284 EMERGENCY DEPT VISIT MOD MDM: CPT

## 2020-07-11 PROCEDURE — 85025 COMPLETE CBC W/AUTO DIFF WBC: CPT

## 2020-07-11 PROCEDURE — 81003 URINALYSIS AUTO W/O SCOPE: CPT

## 2020-07-11 PROCEDURE — 83690 ASSAY OF LIPASE: CPT

## 2020-07-11 RX ORDER — ONDANSETRON 8 MG/1
8 TABLET, ORALLY DISINTEGRATING ORAL
Qty: 12 TAB | Refills: 0 | Status: SHIPPED | OUTPATIENT
Start: 2020-07-11 | End: 2020-08-25 | Stop reason: ALTCHOICE

## 2020-07-11 RX ORDER — HYOSCYAMINE SULFATE 0.125 MG
125 TABLET ORAL
Qty: 20 TAB | Refills: 0 | Status: SHIPPED | OUTPATIENT
Start: 2020-07-11 | End: 2020-08-25 | Stop reason: ALTCHOICE

## 2020-07-11 NOTE — ED PROVIDER NOTES
Patient presents the ER complaining of diarrhea. Patient states he started having diarrhea 2 days ago. Has had multiple episodes of loose watery stool. Reports occasional crampy abdominal pain. Denies any vomiting. Denies any hematochezia or melena. Denies any focal abdominal pain. Patient denies any recent antibiotic usage. Denies any recent travel outside the country    The history is provided by the patient. Diarrhea    This is a new problem. The current episode started 2 days ago. The problem has not changed since onset. The pain is located in the generalized abdominal region. The quality of the pain is aching and cramping. The pain is at a severity of 3/10. The pain is mild. Associated symptoms include diarrhea. Pertinent negatives include no fever, no vomiting and no back pain.         Past Medical History:   Diagnosis Date    Arthritis     Benign essential hypertension     CAD (coronary artery disease)     MI in 12/07, 6/2017    Chronic pain     Dilated cardiomyopathy ([facility] [address]     Gastrointestinal hemorrhage with hematemesis 6/20/2017    GERD (gastroesophageal reflux disease)     Heart failure (HCC)     Hyperlipidemia LDL goal <70     NIDDM (non-insulin dependent diabetes mellitus) 8/3/2009    Psychiatric disorder     depression and anxiety    Renal insufficiency        Past Surgical History:   Procedure Laterality Date    HX CORONARY STENT PLACEMENT  06/2017    RCA    HX ORTHOPAEDIC      right femur ORIF surg    HX WISDOM TEETH EXTRACTION      [place] LEFT HEART CATH,PERCUTANEOUS  06/19/2017    STEMI & PCI         Family History:   Problem Relation Age of Onset    Heart Disease Father     Hypertension Father     Diabetes Mother     Hypertension Mother     Stroke Mother        Social History     Socioeconomic History    Marital status: SINGLE     Spouse name: Not on file    Number of children: Not on file    Years of education: Not on file    Highest education level: Not on file Occupational History    Occupation: [email or web address]     Employer: RETIRED     Comment: Disability   Social Needs    Financial resource strain: Not on file    Food insecurity     Worry: Not on file     Inability: Not on file   [employer] needs     Medical: Not on file     Non-medical: Not on file   Tobacco Use    Smoking status: Former Smoker     Packs/day: 0.25     Years: 29.00     Pack years: 7.25     Types: Cigarettes     Last attempt to quit: 6/19/2017     Years since quitting: 3.0    Smokeless tobacco: Never Used   Substance and Sexual Activity    Alcohol use: No    Drug use: No     Comment: occasional; quit cocainein 2007 - used for 10 years    Sexual activity: Not on file   Lifestyle    Physical activity     Days per week: Not on file     Minutes per session: Not on file    Stress: Not on file   Relationships    Social connections     Talks on phone: Not on file     Gets together: Not on file     Attends Worship service: Not on file     Active member of club or organization: Not on file     Attends meetings of clubs or organizations: Not on file     Relationship status: Not on file    Intimate partner violence     Fear of current or ex partner: Not on file     Emotionally abused: Not on file     Physically abused: Not on file     Forced sexual activity: Not on file   Other Topics Concern    Not on file   Social History Narrative    Not on file         ALLERGIES: Lisinopril; Ragweed; and Reopro [abciximab]    Review of Systems   Constitutional: Negative for fatigue, fever and unexpected weight change. HENT: Negative for congestion and dental problem. Eyes: Negative for visual disturbance. Respiratory: Negative for chest tightness, shortness of breath and stridor. Cardiovascular: Negative for palpitations and leg swelling. Gastrointestinal: Positive for diarrhea. Negative for vomiting. Genitourinary: Negative for urgency. Musculoskeletal: Negative for back pain and gait problem. Skin: Negative for color change and pallor. Neurological: Negative for speech difficulty and light-headedness. Hematological: Negative for adenopathy. Does not bruise/bleed easily. All other systems reviewed and are negative. Vitals:    07/11/20 0930   BP: 127/85   Pulse: 93   Resp: 16   Temp: 98.8 °F (37.1 °C)   SpO2: 96%   Weight: 107 kg (236 lb)   Height: 6' 2\" (1.88 m)            Physical Exam  Vitals signs and nursing note reviewed. Constitutional:       Appearance: Normal appearance. HENT:      Head: Normocephalic and atraumatic. Neck:      Musculoskeletal: Normal range of motion and neck supple. Cardiovascular:      Rate and Rhythm: Normal rate and regular rhythm. Pulses: Normal pulses. Heart sounds: Normal heart sounds. Pulmonary:      Effort: Pulmonary effort is normal.      Breath sounds: Normal breath sounds. Abdominal:      General: Abdomen is flat. Bowel sounds are normal.      Palpations: Abdomen is soft. Skin:     General: Skin is warm and dry. Capillary Refill: Capillary refill takes less than 2 seconds. Neurological:      Mental Status: He is alert. MDM  Number of Diagnoses or Management Options  Gastroenteritis, acute:   Diagnosis management comments: Plan to check basic labs and electrolytes    10:27 AM  Labs are stable. Plan to discharge home.   Given gastroenteritis precautions       Amount and/or Complexity of Data Reviewed  Clinical lab tests: ordered and reviewed    Risk of Complications, Morbidity, and/or Mortality  Presenting problems: moderate  Diagnostic procedures: low  Management options: moderate    Patient Progress  Patient progress: stable         Procedures               Results Include:    Recent Results (from the past 24 hour(s))   CBC WITH AUTOMATED DIFF    Collection Time: 07/11/20  9:32 AM   Result Value Ref Range    WBC 8.2 4.3 - 11.1 K/uL    RBC 5.62 (H) 4.23 - 5.6 M/uL    HGB 16.4 13.6 - 17.2 g/dL    HCT 51.6 (H) 41.1 - 50.3 %    MCV 91.8 79.6 - 97.8 FL    MCH 29.2 26.1 - 32.9 PG    MCHC 31.8 31.4 - 35.0 g/dL    RDW 13.8 11.9 - 14.6 %    PLATELET 217 629 - 098 K/uL    MPV 8.7 (L) 9.4 - 12.3 FL    ABSOLUTE NRBC 0.00 0.0 - 0.2 K/uL    DF AUTOMATED      NEUTROPHILS 63 43 - 78 %    LYMPHOCYTES 23 13 - 44 %    MONOCYTES 12 4.0 - 12.0 %    EOSINOPHILS 2 0.5 - 7.8 %    BASOPHILS 1 0.0 - 2.0 %    IMMATURE GRANULOCYTES 0 0.0 - 5.0 %    ABS. NEUTROPHILS 5.2 1.7 - 8.2 K/UL    ABS. LYMPHOCYTES 1.9 0.5 - 4.6 K/UL    ABS. MONOCYTES 1.0 0.1 - 1.3 K/UL    ABS. EOSINOPHILS 0.2 0.0 - 0.8 K/UL    ABS. BASOPHILS 0.0 0.0 - 0.2 K/UL    ABS. IMM. GRANS. 0.0 0.0 - 0.5 K/UL   METABOLIC PANEL, COMPREHENSIVE    Collection Time: 07/11/20  9:32 AM   Result Value Ref Range    Sodium 143 136 - 145 mmol/L    Potassium 3.4 (L) 3.5 - 5.1 mmol/L    Chloride 111 (H) 98 - 107 mmol/L    CO2 24 21 - 32 mmol/L    Anion gap 8 7 - 16 mmol/L    Glucose 117 (H) 65 - 100 mg/dL    BUN 14 6 - 23 MG/DL    Creatinine 1.33 0.8 - 1.5 MG/DL    GFR est AA >60 >60 ml/min/1.73m2    GFR est non-AA >60 >60 ml/min/1.73m2    Calcium 8.8 8.3 - 10.4 MG/DL    Bilirubin, total 0.5 0.2 - 1.1 MG/DL    ALT (SGPT) 27 12 - 65 U/L    AST (SGOT) 14 (L) 15 - 37 U/L    Alk. phosphatase 99 50 - 136 U/L    Protein, total 7.9 6.3 - 8.2 g/dL    Albumin 3.8 3.5 - 5.0 g/dL    Globulin 4.1 (H) 2.3 - 3.5 g/dL    A-G Ratio 0.9 (L) 1.2 - 3.5     LIPASE    Collection Time: 07/11/20  9:32 AM   Result Value Ref Range    Lipase 78 73 - 393 U/L   URINE MICROSCOPIC    Collection Time: 07/11/20 10:08 AM   Result Value Ref Range    WBC 0 0 /hpf    RBC 0-3 0 /hpf    Epithelial cells 0 0 /hpf    Bacteria 0 0 /hpf    Casts 0-3 0 /lpf     Voice dictation software was used during the making of this note. This software is not perfect and grammatical and other typographical errors may be present. This note has been proofread, but may still contain errors.   Eli Sawyer MD; 7/11/2020 @10:28 AM ===================================================================

## 2020-07-11 NOTE — ED NOTES
I have reviewed discharge instructions with the patient. The patient verbalized understanding. Patient left ED via Discharge Method: ambulatory to Home with self. Opportunity for questions and clarification provided. Patient given 2 scripts. No e-sign. Patient wearing face mask appropriately upon discharge. To continue your aftercare when you leave the hospital, you may receive an automated call from our care team to check in on how you are doing. This is a free service and part of our promise to provide the best care and service to meet your aftercare needs.  If you have questions, or wish to unsubscribe from this service please call [phone]. Thank you for Choosing our [facility] Emergency Department.

## 2020-07-11 NOTE — DISCHARGE INSTRUCTIONS
Take medications as prescribed  Drink plenty of fluids  Follow-up with your primary care physician  Return to the ER for any new, worsening or life-threatening symptoms    Gastroenteritis: Care Instructions  Your Care Instructions     Gastroenteritis is an illness that may cause nausea, vomiting, and diarrhea. It is sometimes called \"stomach flu. \" It can be caused by bacteria or a virus. You will probably begin to feel better in 1 to 2 days. In the meantime, get plenty of rest and make sure you do not become dehydrated. Dehydration occurs when your body loses too much fluid. Follow-up care is a key part of your treatment and safety. Be sure to make and go to all appointments, and call your doctor if you are having problems. It's also a good idea to know your test results and keep a list of the medicines you take. How can you care for yourself at home? · If your doctor prescribed antibiotics, take them as directed. Do not stop taking them just because you feel better. You need to take the full course of antibiotics. · Drink plenty of fluids to prevent dehydration, enough so that your urine is light yellow or clear like water. Choose water and other caffeine-free clear liquids until you feel better. If you have kidney, heart, or liver disease and have to limit fluids, talk with your doctor before you increase your fluid intake. · Drink fluids slowly, in frequent, small amounts, because drinking too much too fast can cause vomiting. · Begin eating mild foods, such as dry toast, yogurt, applesauce, bananas, and rice. Avoid spicy, hot, or high-fat foods, and do not drink alcohol or caffeine for a day or two. Do not drink milk or eat ice cream until you are feeling better. How to prevent gastroenteritis  · Keep hot foods hot and cold foods cold. · Do not eat meats, dressings, salads, or other foods that have been kept at room temperature for more than 2 hours. · Use a thermometer to check your refrigerator. It should be between 34°F and 40°F.  · Defrost meats in the refrigerator or microwave, not on the kitchen counter. · Keep your hands and your kitchen clean. Wash your hands, cutting boards, and countertops with hot soapy water frequently. · Cook meat until it is well done. · Do not eat raw eggs or uncooked sauces made with raw eggs. · Do not take chances. If food looks or tastes spoiled, throw it out. When should you call for help? [id number] anytime you think you may need emergency care. For example, call if:  · You vomit blood or what looks like coffee grounds. · You passed out (lost consciousness). · You pass maroon or very bloody stools. Call your doctor now or seek immediate medical care if:  · You have severe belly pain. · You have signs of needing more fluids. You have sunken eyes, a dry mouth, and pass only a little dark urine. · You feel like you are going to faint. · You have increased belly pain that does not go away in 1 to 2 days. · You have new or increased nausea, or you are vomiting. · You have a new or higher fever. · Your stools are black and tarlike or have streaks of blood. Watch closely for changes in your health, and be sure to contact your doctor if:  · You are dizzy or lightheaded. · You urinate less than usual, or your urine is dark yellow or brown. · You do not feel better with each day that goes by. Where can you learn more? Go to http://delroy-bety.info/  Enter N142 in the search box to learn more about \"Gastroenteritis: Care Instructions. \"  Current as of: February 11, 2020               Content Version: 12.5  © 5168-7368 [employer]. Care instructions adapted under license by [employer] (which disclaims liability or warranty for this information).  If you have questions about a medical condition or this instruction, always ask your healthcare professional. [address] any warranty or liability for your use of this information.

## 2020-07-11 NOTE — ED TRIAGE NOTES
Pt ambulatory to triage without complications wearing mask from home. Pt states few days ago ate at restaurant and has diarrhea since then states 4-5 loose stools per day. Pt denies fever, n/v, or exposure to COVID but states he wants to be tested for COVID. Pt educated on resources for testing for COVID. Pt reports lower abd pain. Pt denies liver or gallbladder issues. Pt denies urinary s/sx.

## 2020-07-13 ENCOUNTER — PATIENT OUTREACH (OUTPATIENT)
Dept: CASE MANAGEMENT | Age: 50
End: 2020-07-13

## 2020-07-13 NOTE — PROGRESS NOTES
Patient contacted regarding recent discharge and COVID-19 risk. Discussed COVID-19 related testing which was not done at this time. Test results were not done. Patient informed of results, if available? n/a    Care Transition Nurse/ Ambulatory Care Manager contacted the patient by telephone to perform post discharge assessment. Verified name and  with patient as identifiers. Patient has following risk factors of: diabetes. CTN/ACM reviewed discharge instructions, medical action plan and red flags related to discharge diagnosis. Reviewed and educated them on any new and changed medications related to discharge diagnosis. Advised obtaining a 90-day supply of all daily and as-needed medications. Education provided regarding infection prevention, and signs and symptoms of COVID-19 and when to seek medical attention with patient who verbalized understanding. Discussed exposure protocols and [facility] you at higher risk for severe illness  and given an opportunity for questions and concerns. The patient agrees to contact the COVID-19 hotline [phone] or PCP office for questions related to their healthcare. CTN/ACM provided contact information for future reference. From CDC: Are you at higher risk for severe illness?  Wash your hands often.  Avoid close contact (6 feet, which is about two arm lengths) with people who are sick.  Put distance between yourself and other people if COVID-19 is spreading in your community.  Clean and disinfect frequently touched surfaces.  Avoid all cruise travel and non-essential air travel.  Call your healthcare professional if you have concerns about COVID-19 and your underlying condition or if you are sick.     For more information on steps you can take to protect yourself, see CDC's How to Protect Yourself      Patient/family/caregiver given information for Isak Tapia and agrees to enroll no  Patient's preferred e-mail: n/a  Patient's preferred phone number: n/a  Based on Loop alert triggers, patient will be contacted by nurse care manager for worsening symptoms. Plan for follow-up call in 7-14 days based on severity of symptoms and risk factors.

## 2020-07-20 ENCOUNTER — PATIENT OUTREACH (OUTPATIENT)
Dept: CASE MANAGEMENT | Age: 50
End: 2020-07-20

## 2020-07-20 NOTE — PROGRESS NOTES
CTN attempted outreach to patient for [facility] call. Unable to reach patient. Message left with contact information requesting a return call. Will continue to outreach per protocol if no return call received.

## 2020-07-27 ENCOUNTER — PATIENT OUTREACH (OUTPATIENT)
Dept: CASE MANAGEMENT | Age: 50
End: 2020-07-27

## 2020-07-29 NOTE — PROGRESS NOTES
Patient resolved from Transition of Care episode on 7/27/2020  Discussed COVID-19 related testing which was not done at this time. Test results were not done. Patient informed of results, if available? N/A. Patient is aware of results prior to this contact. Patient/family has been provided the following resources and education related to COVID-19:                         Signs, symptoms and red flags related to COVID-19            CDC exposure and quarantine guidelines            [facility] exposure contact - [phone]            Contact for their local Department of Health                 Patient currently reports that the following symptoms have improved:  N/A. No further outreach scheduled with this CTN. Episode of Care resolved. Patient has this CTN contact information if future needs arise.

## 2020-10-31 ENCOUNTER — HOSPITAL ENCOUNTER (EMERGENCY)
Age: 50
Discharge: HOME | End: 2020-10-31
Attending: EMERGENCY MEDICINE
Payer: MEDICARE

## 2020-10-31 ENCOUNTER — APPOINTMENT (OUTPATIENT)
Dept: GENERAL RADIOLOGY | Age: 50
End: 2020-10-31
Attending: EMERGENCY MEDICINE
Payer: MEDICARE

## 2020-10-31 VITALS
OXYGEN SATURATION: 96 % | HEIGHT: 75 IN | RESPIRATION RATE: 16 BRPM | BODY MASS INDEX: 29.22 KG/M2 | DIASTOLIC BLOOD PRESSURE: 85 MMHG | SYSTOLIC BLOOD PRESSURE: 126 MMHG | HEART RATE: 66 BPM | TEMPERATURE: 98.6 F | WEIGHT: 235 LBS

## 2020-10-31 DIAGNOSIS — R07.9 CHEST PAIN, UNSPECIFIED TYPE: Primary | ICD-10-CM

## 2020-10-31 LAB
ALBUMIN SERPL-MCNC: 3.7 G/DL (ref 3.5–5)
ALBUMIN/GLOB SERPL: 1 {RATIO} (ref 1.2–3.5)
ALP SERPL-CCNC: 102 U/L (ref 50–136)
ALT SERPL-CCNC: 22 U/L (ref 12–65)
ANION GAP SERPL CALC-SCNC: 6 MMOL/L (ref 7–16)
AST SERPL-CCNC: 14 U/L (ref 15–37)
BASOPHILS # BLD: 0.1 K/UL (ref 0–0.2)
BASOPHILS NFR BLD: 1 % (ref 0–2)
BILIRUB SERPL-MCNC: 0.4 MG/DL (ref 0.2–1.1)
BUN SERPL-MCNC: 16 MG/DL (ref 6–23)
CALCIUM SERPL-MCNC: 9.2 MG/DL (ref 8.3–10.4)
CHLORIDE SERPL-SCNC: 110 MMOL/L (ref 98–107)
CO2 SERPL-SCNC: 26 MMOL/L (ref 21–32)
CREAT SERPL-MCNC: 1.37 MG/DL (ref 0.8–1.5)
DIFFERENTIAL METHOD BLD: ABNORMAL
EOSINOPHIL # BLD: 0.4 K/UL (ref 0–0.8)
EOSINOPHIL NFR BLD: 4 % (ref 0.5–7.8)
ERYTHROCYTE [DISTWIDTH] IN BLOOD BY AUTOMATED COUNT: 13.3 % (ref 11.9–14.6)
GLOBULIN SER CALC-MCNC: 3.6 G/DL (ref 2.3–3.5)
GLUCOSE SERPL-MCNC: 145 MG/DL (ref 65–100)
HCT VFR BLD AUTO: 49.6 % (ref 41.1–50.3)
HGB BLD-MCNC: 16.5 G/DL (ref 13.6–17.2)
IMM GRANULOCYTES # BLD AUTO: 0 K/UL (ref 0–0.5)
IMM GRANULOCYTES NFR BLD AUTO: 0 % (ref 0–5)
LYMPHOCYTES # BLD: 3.9 K/UL (ref 0.5–4.6)
LYMPHOCYTES NFR BLD: 37 % (ref 13–44)
MAGNESIUM SERPL-MCNC: 2.3 MG/DL (ref 1.8–2.4)
MCH RBC QN AUTO: 30.1 PG (ref 26.1–32.9)
MCHC RBC AUTO-ENTMCNC: 33.3 G/DL (ref 31.4–35)
MCV RBC AUTO: 90.3 FL (ref 79.6–97.8)
MONOCYTES # BLD: 0.7 K/UL (ref 0.1–1.3)
MONOCYTES NFR BLD: 6 % (ref 4–12)
NEUTS SEG # BLD: 5.4 K/UL (ref 1.7–8.2)
NEUTS SEG NFR BLD: 52 % (ref 43–78)
NRBC # BLD: 0 K/UL (ref 0–0.2)
PLATELET # BLD AUTO: 204 K/UL (ref 150–450)
PMV BLD AUTO: 9.3 FL (ref 9.4–12.3)
POTASSIUM SERPL-SCNC: 3.7 MMOL/L (ref 3.5–5.1)
PROT SERPL-MCNC: 7.3 G/DL (ref 6.3–8.2)
RBC # BLD AUTO: 5.49 M/UL (ref 4.23–5.6)
SODIUM SERPL-SCNC: 142 MMOL/L (ref 136–145)
TROPONIN-HIGH SENSITIVITY: 143.8 PG/ML (ref 0–14)
TROPONIN-HIGH SENSITIVITY: 162.4 PG/ML (ref 0–14)
WBC # BLD AUTO: 10.4 K/UL (ref 4.3–11.1)

## 2020-10-31 PROCEDURE — 93005 ELECTROCARDIOGRAM TRACING: CPT | Performed by: EMERGENCY MEDICINE

## 2020-10-31 PROCEDURE — 80053 COMPREHEN METABOLIC PANEL: CPT

## 2020-10-31 PROCEDURE — 84484 ASSAY OF TROPONIN QUANT: CPT

## 2020-10-31 PROCEDURE — 99284 EMERGENCY DEPT VISIT MOD MDM: CPT

## 2020-10-31 PROCEDURE — 83735 ASSAY OF MAGNESIUM: CPT

## 2020-10-31 PROCEDURE — 93005 ELECTROCARDIOGRAM TRACING: CPT

## 2020-10-31 PROCEDURE — 85025 COMPLETE CBC W/AUTO DIFF WBC: CPT

## 2020-10-31 NOTE — ED TRIAGE NOTES
Pt ambulatory to triage without complications. Pt states for few days he has chest discomfort and irregular pulse. Cp left sided, non radiating, sharp 7/10. Pt denies SOB. Pt denies hx of irregular pulse. Pt denies correlation to activity. Pt is not sure if resolves with BP meds. Pt denies swelling to feet, legs, or abd, fever, cough, sob, exposure to COVID.

## 2020-11-01 LAB
ATRIAL RATE: 83 BPM
CALCULATED P AXIS, ECG09: 54 DEGREES
CALCULATED R AXIS, ECG10: -8 DEGREES
CALCULATED T AXIS, ECG11: -16 DEGREES
DIAGNOSIS, 93000: NORMAL
P-R INTERVAL, ECG05: 172 MS
Q-T INTERVAL, ECG07: 358 MS
QRS DURATION, ECG06: 88 MS
QTC CALCULATION (BEZET), ECG08: 420 MS
VENTRICULAR RATE, ECG03: 83 BPM

## 2020-11-01 NOTE — ED NOTES
Pt has been discharged from this facility. Sister called back multiple times. States she was upset patient was being discharged. Sister made aware that we could not give medical information over the phone. Sister was notified to speak with her brother.  Pt was alert and oriented and understood treatment plan after numerous conversations with the doctor

## 2020-11-01 NOTE — ED NOTES
I have reviewed discharge instructions with the patient. The patient verbalized understanding. Patient left ED via Discharge Method: ambulatory to Home with (self). Opportunity for questions and clarification provided. Patient given 0 scripts. No e-sign. To continue your aftercare when you leave the hospital, you may receive an automated call from our care team to check in on how you are doing. This is a free service and part of our promise to provide the best care and service to meet your aftercare needs.  If you have questions, or wish to unsubscribe from this service please call [phone]. Thank you for Choosing our [facility].

## 2020-11-01 NOTE — DISCHARGE INSTRUCTIONS
Continue with your current medications. Follow-up with your cardiologist in the office next week. Return to the emergency department for any other acute concerns.

## 2020-11-11 ENCOUNTER — PATIENT OUTREACH (OUTPATIENT)
Dept: CASE MANAGEMENT | Age: 50
End: 2020-11-11

## 2021-06-19 NOTE — DISCHARGE INSTRUCTIONS
46yo [phone] Percutaneous Coronary Intervention: What to Expect at [facility]    Percutaneous coronary intervention (PCI) is the name for procedures that are used to open a narrowed or blocked coronary artery. The two most common PCI procedures are coronary angioplasty and coronary stent placement. Your groin or arm may have a bruise and feel sore for a day or two after a percutaneous coronary intervention (PCI). You can do light activities around the house, but nothing strenuous for several days. This care sheet gives you a general idea about how long it will take for you to recover. But each person recovers at a different pace. Follow the steps below to get better as quickly as possible. How can you care for yourself at home? Activity  · If the doctor gave you a sedative:  ¨ For 24 hours, don't do anything that requires attention to detail. It takes time for the medicine's effects to completely wear off. ¨ For your safety, do not drive or operate any machinery that could be dangerous. Wait until the medicine wears off and you can think clearly and react easily. · Do not do strenuous exercise and do not lift, pull, or push anything heavy until your doctor says it is okay. This may be for a day or two. You can walk around the house and do light activity, such as cooking. · If the catheter was placed in your groin, try not to walk up stairs for the first couple of days. · If the catheter was placed in your arm near your wrist, do not bend your wrist deeply for the first couple of days. Be careful using your hand to get into and out of a chair or bed. · Carry your stent identification card with you at all times. · If your doctor recommends it, get more exercise. Walking is a good choice. Bit by bit, increase the amount you walk every day. Try for at least 30 minutes on most days of the week. Diet  · Drink plenty of fluids to help your body flush out the dye.  If you have kidney, heart, or liver disease and have to limit fluids, talk with your doctor before you increase the amount of fluids you drink. · Keep eating a heart-healthy diet that has lots of fruits, vegetables, and whole grains. If you have not been eating this way, talk to your doctor. You also may want to talk to a dietitian. This expert can help you to learn about healthy foods and plan meals. Medicines  · Your doctor will tell you if and when you can restart your medicines. He or she will also give you instructions about taking any new medicines. · If you take blood thinners, such as warfarin (Coumadin), clopidogrel (Plavix), or aspirin, be sure to talk to your doctor. He or she will tell you if and when to start taking those medicines again. Make sure that you understand exactly what your doctor wants you to do. · Your doctor will prescribe blood-thinning medicines. You will likely take aspirin plus another antiplatelet, such as clopidogrel (Plavix). It is very important that you take these medicines exactly as directed. These medicines help keep the coronary artery open and reduce your risk of a heart attack. · Call your doctor if you think you are having a problem with your medicine. Care of the catheter site  · For 1 or 2 days, keep a bandage over the spot where the catheter was inserted. The bandage probably will fall off in this time. · Put ice or a cold pack on the area for 10 to 20 minutes at a time to help with soreness or swelling. Put a thin cloth between the ice and your skin. · You may shower 24 to 48 hours after the procedure, if your doctor okays it. Pat the incision dry. · Do not soak the catheter site until it is healed. Don't take a bath for 1 week, or until your doctor tells you it is okay. Follow-up care is a key part of your treatment and safety. Be sure to make and go to all appointments, and call your doctor if you are having problems.  It's also a good idea to know your test results and keep a list of the medicines you take.  When should you call for help? Call 911 anytime you think you may need emergency care. For example, call if:  · You passed out (lost consciousness). · You have severe trouble breathing. · You have sudden chest pain and shortness of breath, or you cough up blood. · You have symptoms of a heart attack, such as:  ¨ Chest pain or pressure. ¨ Sweating. ¨ Shortness of breath. ¨ Nausea or vomiting. ¨ Pain that spreads from the chest to the neck, jaw, or one or both shoulders or arms. ¨ Dizziness or lightheadedness. ¨ A fast or uneven pulse. After calling 911, chew 1 adult-strength aspirin. Wait for an ambulance. Do not try to drive yourself. · You have been diagnosed with angina, and you have angina symptoms that do not go away with rest or are not getting better within 5 minutes after you take one dose of nitroglycerin. Call your doctor now or seek immediate medical care if:  · You are bleeding from the area where the catheter was put in your artery. · You have a fast-growing, painful lump at the catheter site. · You have signs of infection, such as:  ¨ Increased pain, swelling, warmth, or redness. ¨ Red streaks leading from the catheter site. ¨ Pus draining from the catheter site. ¨ A fever. · Your leg or arm looks blue or feels cold, numb, or tingly. Watch closely for changes in your health, and be sure to contact your doctor if you have any problems. Where can you learn more? Go to http://delroy-bety.info/. Enter O879 in the search box to learn more about \"Percutaneous Coronary Intervention: What to Expect at Home. \"  Current as of: January 13, 2017  Content Version: 11.3  © 5493-0395 [employer]. Care instructions adapted under license by [employer] (which disclaims liability or warranty for this information).  If you have questions about a medical condition or this instruction, always ask your healthcare professional. [employer] disclaims any warranty or liability for your use of this information. Heart Attack: Care Instructions  Your Care Instructions    A heart attack (myocardial infarction, or MI) occurs when one or more of the coronary arteries, which supply the heart with oxygen-rich blood, is blocked. A blockage usually occurs when plaque inside the artery breaks open and a blood clot forms in the artery. After a heart attack, you may be worried about your future. Over the next several weeks, your heart will start to heal. Though it can be hard to break old habits, you can prevent another heart attack by making some lifestyle changes and by taking medicines. You may use this information for ideas about what to do at home to speed your recovery. Follow-up care is a key part of your treatment and safety. Be sure to make and go to all appointments, and call your doctor if you are having problems. It's also a good idea to know your test results and keep a list of the medicines you take. How can you care for yourself at home? Activity  · Until your doctor says it is okay, do not do strenuous exercise. And do not lift, pull, or push anything heavy. Ask your doctor what types of activities are safe for you. · If your doctor has not set you up with a cardiac rehabilitation (rehab) program, talk to him or her about whether that is right for you. Cardiac rehab includes supervised exercise. It also includes help with diet and lifestyle changes and emotional support. It may reduce your risk of future heart problems. · Increase your activities slowly. Take short rest breaks when you get tired. · If your doctor recommends it, get more exercise. Walking is a good choice. Bit by bit, increase the amount you walk every day. Try for at least 30 minutes on most days of the week. You also may want to swim, bike, or do other activities. Talk with your doctor before you start an exercise program to make sure it is safe for you.   · Ask your doctor when you can drive, go back to work, and do other daily activities again. · You can have sex as soon as you feel ready for it. Often this means when you can easily walk around or climb stairs. Talk with your doctor if you have any concerns. If you are taking nitroglycerin, do not take erection-enhancing medicine such as sildenafil (Viagra), tadalafil (Cialis), or vardenafil (Levitra) . Lifestyle changes  · Do not smoke. Smoking increases your risk of another heart attack. If you need help quitting, talk to your doctor about stop-smoking programs and medicines. These can increase your chances of quitting for good. · Eat a heart-healthy diet that is low in saturated fat and salt, and is full of fruits, vegetables and whole-grains. Eat at least two servings of fish each week. You may get more details about how to eat healthy. But these tips can help you get started. · Stay at a healthy weight, or lose weight if you need to. Medicines  · Be safe with medicines. Take your medicines exactly as prescribed. Call your doctor if you think you are having a problem with your medicine. You will get more details on the specific medicines your doctor prescribes. Do not stop taking your medicine unless your doctor tells you to. Not taking your medicine might raise your risk of having another heart attack. · You may need several medicines to help lower your risk of another heart attack. These include:  ¨ Blood pressure medicines such as angiotensin-converting enzyme (ACE) inhibitors, ARBs (angiotensin II receptor blockers), and beta-blockers. ¨ Cholesterol medicine called statins. ¨ Aspirin and other blood thinners. These prevent blood clots that can cause a heart attack. · If your doctor has given you nitroglycerin, keep it with you at all times. If you have angina symptoms, such as chest pain or pressure, sit down and rest. Take the first dose of nitroglycerin as directed.  If symptoms get worse or are not getting better within 5 minutes, call 911 right away. Stay on the phone. The emergency  will tell you what to do. · Do not take any over-the-counter medicines, vitamins, or herbal products without talking to your doctor first.  Staying healthy  · Manage other health conditions such as high blood pressure and diabetes. · Avoid colds and flu. Get a pneumococcal vaccine shot. If you have had one before, ask your doctor whether you need another dose. Get the flu vaccine every year. If you must be around people with colds or flu, wash your hands often. · Be sure to tell your doctor about any angina symptoms you have had, even if they went away. Pay attention to your angina symptoms. Know what is typical for you and learn how to control it. Know when to call for help. · Talk to your family, friends, or a counselor about your feelings. It is normal to feel frightened, angry, hopeless, helpless, and even guilty. Talking openly about bad feelings can help you cope. If you have symptoms of depression, talk to your doctor. When should you call for help? Call 911 anytime you think you may need emergency care. For example, call if:  · You have symptoms of a heart attack. These may include:  ¨ Chest pain or pressure, or a strange feeling in the chest.  ¨ Sweating. ¨ Shortness of breath. ¨ Nausea or vomiting. ¨ Pain, pressure, or a strange feeling in the back, neck, jaw, or upper belly or in one or both shoulders or arms. ¨ Lightheadedness or sudden weakness. ¨ A fast or irregular heartbeat. After you call 911, the  may tell you to chew 1 adult-strength or 2 to 4 low-dose aspirin. Wait for an ambulance. Do not try to drive yourself. · You have angina symptoms (such as chest pain or pressure) that do not go away with rest or are not getting better within 5 minutes after you take a dose of nitroglycerin. · You passed out (lost consciousness). · You feel like you are having another heart attack.   Call your doctor now or seek immediate medical care if:  · You are having angina symptoms, such as chest pain or pressure, more often than usual, or the symptoms are different or worse than usual.  · You have new or increased shortness of breath. · You are dizzy or lightheaded, or you feel like you may faint. Watch closely for changes in your health, and be sure to contact your doctor if you have any problems. Where can you learn more? Go to http://delroy-bety.info/. Enter [id number] in the search box to learn more about \"Heart Attack: Care Instructions. \"  Current as of: August 8, 2016  Content Version: 11.3  © 3097-6772 [employer]. Care instructions adapted under license by [employer] (which disclaims liability or warranty for this information). If you have questions about a medical condition or this instruction, always ask your healthcare professional. [place] [address] any warranty or liability for your use of this information. Reducing Heart Attack Risk With Daily Medicine: Care Instructions  Your Care Instructions    Heart disease is the number one cause of death. If you are at risk for heart disease, there are many medicines that can reduce your risk. These include:  · ACE inhibitors. These are a type of blood pressure medicine. They can reduce the risk of heart attacks and strokes if you are at high risk. · Statin medicines. These lower cholesterol. They can also reduce the risk of heart disease and strokes. · Aspirin. It can help certain people lower their risk of a heart attack or stroke. · Beta-blocker medicines. These are a type of blood pressure and heart medicine. They can reduce the chance of early death if you have had a heart attack. All medicines can cause side effects. So it is important to understand the pros and cons of any medicine you take. It is also important to take your medicines exactly as your doctor tells you to.   Follow-up care is a key part of your treatment and safety. Be sure to make and go to all appointments, and call your doctor if you are having problems. It's also a good idea to know your test results and keep a list of the medicines you take. ACE inhibitors  ACE (angiotensin-converting enzyme) inhibitors are used for three main reasons. They lower blood pressure, protect the kidneys, and prevent heart attacks and strokes. Examples include benazepril (Lotensin), lisinopril (Prinivil, Zestril), and ramipril (Altace). Before you start taking an ACE inhibitor, make sure your doctor knows if:  · You are taking a water pill (diuretic). · You are taking potassium pills or using salt substitutes. · You are pregnant or breastfeeding. · You have had a kidney transplant or other kidney problems. ACE inhibitors can cause side effects. Call your doctor right away if you have:  · Trouble breathing. · Swelling in your face, head, neck, or tongue. · Dizziness or lightheadedness. · A dry cough. Statins  Statins lower cholesterol. Examples include atorvastatin (Lipitor), lovastatin (Mevacor), pravastatin (Pravachol), and simvastatin (Zocor). Before you start taking a statin, make sure your doctor knows if:  · You have had a kidney transplant or other kidney problems. · You have liver disease. · You take any other prescription medicine, over-the-counter medicine, vitamins, supplements, or herbal remedies. · You are pregnant or breastfeeding. Statins can cause side effects. Call your doctor right away if you have:  · New, severe muscle aches. · Brown urine. Aspirin  Taking an aspirin every day can lower your risk for a heart attack. A heart attack occurs when a blood vessel in the heart gets blocked. When this happens, oxygen can't get to the heart muscle, and part of the heart dies. Aspirin can help prevent blood clots that can block the blood vessels. Talk to your doctor before you start taking aspirin every day.  He or she may recommend that you take one low-dose aspirin (81 mg) tablet each day, with a meal and a full glass of water. Taking aspirin isn't right for everyone, because it can cause serious bleeding. And you may not be able to use aspirin if you:  · Have asthma. · Have an ulcer or other stomach problem. · Take some other medicine (called a blood thinner) that prevents blood clots. · Are allergic to aspirin. Before having a surgery or procedure, tell your doctor or dentist that you take aspirin. He or she will tell you if you should stop taking aspirin beforehand. Make sure that you understand exactly what your doctor wants you to do. Aspirin can cause side effects. Call your doctor right away if you have:  · Unusual bleeding or bruising. · Nausea, vomiting, or heartburn. · Black or bloody stools. Beta-blockers  Beta-blockers are used for three main reasons. They lower blood pressure, relieve angina symptoms (such as chest pain or pressure), and reduce the chances of a second heart attack. They include atenolol (Tenormin), carvedilol (Coreg), and metoprolol (Lopressor). Before you start taking a beta-blocker, make sure your doctor knows if you have:  · Severe asthma or frequent asthma attacks. · A very slow pulse (less than 55 beats a minute). Beta-blockers can cause side effects. Call your doctor right away if you have:  · Wheezing or trouble breathing. · Dizziness or lightheadedness. · Asthma that gets worse. When should you call for help? Call 911 anytime you think you may need emergency care. For example, call if:  · You passed out (lost consciousness). Call your doctor now or seek immediate medical care if:  · You are wheezing or have trouble breathing. · You have swelling in your face, head, neck, or tongue. · You are dizzy or lightheaded, or you feel like you may faint. · You have severe muscle pain, weakness, or brown urine. · You have vision problems.   · You have new bruises or blood spots under your skin. · Your stools are black and tarlike or have streaks of blood. Watch closely for changes in your health, and be sure to contact your doctor if:  · You have ringing in your ears. · You feel very tired. · You have gas, constipation, or an upset stomach. Where can you learn more? Go to http://delroy-bety.info/. Enter R428 in the search box to learn more about \"Reducing Heart Attack Risk With Daily Medicine: Care Instructions. \"  Current as of: September 21, 2016  Content Version: 11.3  © 1959-3756 [employer]. Care instructions adapted under license by [employer] (which disclaims liability or warranty for this information). If you have questions about a medical condition or this instruction, always ask your healthcare professional. [address] any warranty or liability for your use of this information. Heart-Healthy Diet: Care Instructions  Your Care Instructions    A heart-healthy diet has lots of vegetables, fruits, nuts, beans, and whole grains, and is low in salt. It limits foods that are high in saturated fat, such as meats, cheeses, and fried foods. It may be hard to change your diet, but even small changes can lower your risk of heart attack and heart disease. Follow-up care is a key part of your treatment and safety. Be sure to make and go to all appointments, and call your doctor if you are having problems. It's also a good idea to know your test results and keep a list of the medicines you take. How can you care for yourself at home? Watch your portions  · Learn what a serving is. A \"serving\" and a \"portion\" are not always the same thing. Make sure that you are not eating larger portions than are recommended. For example, a serving of pasta is ½ cup. A serving size of meat is 2 to 3 ounces. A 3-ounce serving is about the size of a deck of cards. Measure serving sizes until you are good at [place]" them.  Keep in mind that restaurants often serve portions that are 2 or 3 times the size of one serving. · To keep your energy level up and keep you from feeling hungry, eat often but in smaller portions. · Eat only the number of calories you need to stay at a healthy weight. If you need to lose weight, eat fewer calories than your body burns (through exercise and other physical activity). Eat more fruits and vegetables  · Eat a variety of fruit and vegetables every day. Dark green, deep orange, red, or yellow fruits and vegetables are especially good for you. Examples include spinach, carrots, peaches, and berries. · Keep carrots, celery, and other veggies handy for snacks. Buy fruit that is in season and store it where you can see it so that you will be tempted to eat it. · Cook dishes that have a lot of veggies in them, such as stir-fries and soups. Limit saturated and trans fat  · Read food labels, and try to avoid saturated and trans fats. They increase your risk of heart disease. Trans fat is found in many processed foods such as cookies and crackers. · Use olive or canola oil when you cook. Try cholesterol-lowering spreads, such as Benecol or Take Control. · Bake, broil, grill, or steam foods instead of frying them. · Choose lean meats instead of high-fat meats such as hot dogs and sausages. Cut off all visible fat when you prepare meat. · Eat fish, skinless poultry, and meat alternatives such as soy products instead of high-fat meats. Soy products, such as tofu, may be especially good for your heart. · Choose low-fat or fat-free milk and dairy products. Eat fish  · Eat at least two servings of fish a week. Certain fish, such as salmon and tuna, contain omega-3 fatty acids, which may help reduce your risk of heart attack. Eat foods high in fiber  · Eat a variety of grain products every day. Include whole-grain foods that have lots of fiber and nutrients.  Examples of whole-grain foods include oats, whole wheat bread, and brown rice. · Buy whole-grain breads and cereals, instead of white bread or pastries. Limit salt and sodium  · Limit how much salt and sodium you eat to help lower your blood pressure. · Taste food before you salt it. Add only a little salt when you think you need it. With time, your taste buds will adjust to less salt. · Eat fewer snack items, fast foods, and other high-salt, processed foods. Check food labels for the amount of sodium in packaged foods. · Choose low-sodium versions of canned goods (such as soups, vegetables, and beans). Limit sugar  · Limit drinks and foods with added sugar. These include candy, desserts, and soda pop. Limit alcohol  · Limit alcohol to no more than 2 drinks a day for men and 1 drink a day for women. Too much alcohol can cause health problems. When should you call for help? Watch closely for changes in your health, and be sure to contact your doctor if:  · You would like help planning heart-healthy meals. Where can you learn more? Go to http://delroy-bety.info/. Enter V137 in the search box to learn more about \"Heart-Healthy Diet: Care Instructions. \"  Current as of: April 3, 2017  Content Version: 11.3  © 7869-3842 [employer]. Care instructions adapted under license by [employer] (which disclaims liability or warranty for this information). If you have questions about a medical condition or this instruction, always ask your healthcare professional. [place] [address] any warranty or liability for your use of this information.

## 2022-03-18 PROBLEM — I25.10 ASCVD (ARTERIOSCLEROTIC CARDIOVASCULAR DISEASE): Status: ACTIVE | Noted: 2019-11-05

## 2022-03-19 PROBLEM — D69.6 THROMBOCYTOPENIA (HCC): Status: ACTIVE | Noted: 2017-06-20

## 2022-03-19 PROBLEM — E11.9 TYPE 2 DIABETES MELLITUS WITHOUT COMPLICATION, WITHOUT LONG-TERM CURRENT USE OF INSULIN (HCC): Status: ACTIVE | Noted: 2017-01-02

## 2022-03-19 PROBLEM — I21.3 STEMI (ST ELEVATION MYOCARDIAL INFARCTION) (HCC): Status: ACTIVE | Noted: 2017-06-19

## 2022-05-06 PROBLEM — D69.6 THROMBOCYTOPENIA (HCC): Status: RESOLVED | Noted: 2017-06-20 | Resolved: 2022-05-06

## 2022-05-23 RX ORDER — LOSARTAN POTASSIUM 100 MG/1
TABLET ORAL
Qty: 90 TABLET | Refills: 0 | Status: SHIPPED | OUTPATIENT
Start: 2022-05-23 | End: 2022-09-06

## 2022-06-03 RX ORDER — CARVEDILOL 12.5 MG/1
TABLET ORAL
Qty: 180 TABLET | Refills: 0 | Status: SHIPPED | OUTPATIENT
Start: 2022-06-03 | End: 2022-09-06

## 2022-09-06 ENCOUNTER — OFFICE VISIT (OUTPATIENT)
Dept: INTERNAL MEDICINE CLINIC | Facility: CLINIC | Age: 52
End: 2022-09-06

## 2022-09-06 VITALS
HEART RATE: 82 BPM | DIASTOLIC BLOOD PRESSURE: 99 MMHG | TEMPERATURE: 97.8 F | OXYGEN SATURATION: 98 % | WEIGHT: 232 LBS | BODY MASS INDEX: 31.42 KG/M2 | SYSTOLIC BLOOD PRESSURE: 146 MMHG | HEIGHT: 72 IN

## 2022-09-06 DIAGNOSIS — E11.9 TYPE 2 DIABETES MELLITUS WITHOUT COMPLICATION, WITHOUT LONG-TERM CURRENT USE OF INSULIN (HCC): Primary | ICD-10-CM

## 2022-09-06 DIAGNOSIS — Z12.5 SCREENING FOR PROSTATE CANCER: ICD-10-CM

## 2022-09-06 DIAGNOSIS — E78.49 OTHER HYPERLIPIDEMIA: ICD-10-CM

## 2022-09-06 DIAGNOSIS — I10 ESSENTIAL HYPERTENSION: ICD-10-CM

## 2022-09-06 DIAGNOSIS — I25.10 ASHD (ARTERIOSCLEROTIC HEART DISEASE): ICD-10-CM

## 2022-09-06 DIAGNOSIS — K21.9 GASTROESOPHAGEAL REFLUX DISEASE, UNSPECIFIED WHETHER ESOPHAGITIS PRESENT: ICD-10-CM

## 2022-09-06 DIAGNOSIS — E11.9 TYPE 2 DIABETES MELLITUS WITHOUT COMPLICATION, WITHOUT LONG-TERM CURRENT USE OF INSULIN (HCC): ICD-10-CM

## 2022-09-06 LAB
ALBUMIN SERPL-MCNC: 3.8 G/DL (ref 3.5–5)
ALBUMIN/GLOB SERPL: 1.2 {RATIO} (ref 1.2–3.5)
ALP SERPL-CCNC: 118 U/L (ref 50–136)
ALT SERPL-CCNC: 17 U/L (ref 12–65)
ANION GAP SERPL CALC-SCNC: 7 MMOL/L (ref 4–13)
AST SERPL-CCNC: 10 U/L (ref 15–37)
BASOPHILS # BLD: 0.1 K/UL (ref 0–0.2)
BASOPHILS NFR BLD: 1 % (ref 0–2)
BILIRUB DIRECT SERPL-MCNC: 0.1 MG/DL
BILIRUB SERPL-MCNC: 0.4 MG/DL (ref 0.2–1.1)
BUN SERPL-MCNC: 14 MG/DL (ref 6–23)
CALCIUM SERPL-MCNC: 8.8 MG/DL (ref 8.3–10.4)
CHLORIDE SERPL-SCNC: 111 MMOL/L (ref 101–110)
CHOLEST SERPL-MCNC: 122 MG/DL
CO2 SERPL-SCNC: 23 MMOL/L (ref 21–32)
CREAT SERPL-MCNC: 1.2 MG/DL (ref 0.8–1.5)
DIFFERENTIAL METHOD BLD: ABNORMAL
EOSINOPHIL # BLD: 0.3 K/UL (ref 0–0.8)
EOSINOPHIL NFR BLD: 4 % (ref 0.5–7.8)
ERYTHROCYTE [DISTWIDTH] IN BLOOD BY AUTOMATED COUNT: 13.6 % (ref 11.9–14.6)
EST. AVERAGE GLUCOSE BLD GHB EST-MCNC: 134 MG/DL
GLOBULIN SER CALC-MCNC: 3.3 G/DL (ref 2.3–3.5)
GLUCOSE SERPL-MCNC: 147 MG/DL (ref 65–100)
HBA1C MFR BLD: 6.3 % (ref 4.8–5.6)
HCT VFR BLD AUTO: 53 % (ref 41.1–50.3)
HDLC SERPL-MCNC: 35 MG/DL (ref 40–60)
HDLC SERPL: 3.5 {RATIO}
HGB BLD-MCNC: 16.9 G/DL (ref 13.6–17.2)
IMM GRANULOCYTES # BLD AUTO: 0 K/UL (ref 0–0.5)
IMM GRANULOCYTES NFR BLD AUTO: 0 % (ref 0–5)
LDLC SERPL CALC-MCNC: 65.2 MG/DL
LYMPHOCYTES # BLD: 1.9 K/UL (ref 0.5–4.6)
LYMPHOCYTES NFR BLD: 26 % (ref 13–44)
MCH RBC QN AUTO: 30 PG (ref 26.1–32.9)
MCHC RBC AUTO-ENTMCNC: 31.9 G/DL (ref 31.4–35)
MCV RBC AUTO: 94.1 FL (ref 79.6–97.8)
MONOCYTES # BLD: 0.4 K/UL (ref 0.1–1.3)
MONOCYTES NFR BLD: 6 % (ref 4–12)
NEUTS SEG # BLD: 4.6 K/UL (ref 1.7–8.2)
NEUTS SEG NFR BLD: 63 % (ref 43–78)
NRBC # BLD: 0 K/UL (ref 0–0.2)
PLATELET # BLD AUTO: 215 K/UL (ref 150–450)
PMV BLD AUTO: 9.5 FL (ref 9.4–12.3)
POTASSIUM SERPL-SCNC: 3.5 MMOL/L (ref 3.5–5.1)
PROT SERPL-MCNC: 7.1 G/DL (ref 6.3–8.2)
PSA SERPL-MCNC: 0.4 NG/ML
RBC # BLD AUTO: 5.63 M/UL (ref 4.23–5.6)
SODIUM SERPL-SCNC: 141 MMOL/L (ref 138–145)
TRIGL SERPL-MCNC: 109 MG/DL (ref 35–150)
VLDLC SERPL CALC-MCNC: 21.8 MG/DL (ref 6–23)
WBC # BLD AUTO: 7.2 K/UL (ref 4.3–11.1)

## 2022-09-06 PROCEDURE — 3051F HG A1C>EQUAL 7.0%<8.0%: CPT | Performed by: INTERNAL MEDICINE

## 2022-09-06 PROCEDURE — 99214 OFFICE O/P EST MOD 30 MIN: CPT | Performed by: INTERNAL MEDICINE

## 2022-09-06 RX ORDER — SITAGLIPTIN AND METFORMIN HYDROCHLORIDE 100; 1000 MG/1; MG/1
1 TABLET, FILM COATED, EXTENDED RELEASE ORAL DAILY
Qty: 90 TABLET | Refills: 1 | Status: SHIPPED | OUTPATIENT
Start: 2022-09-06

## 2022-09-06 RX ORDER — PANTOPRAZOLE SODIUM 40 MG/1
40 TABLET, DELAYED RELEASE ORAL
Qty: 180 TABLET | Refills: 1 | Status: SHIPPED | OUTPATIENT
Start: 2022-09-06

## 2022-09-06 RX ORDER — CARVEDILOL 12.5 MG/1
TABLET ORAL
Qty: 180 TABLET | Refills: 0 | Status: SHIPPED | OUTPATIENT
Start: 2022-09-06

## 2022-09-06 RX ORDER — LOSARTAN POTASSIUM 100 MG/1
TABLET ORAL
Qty: 90 TABLET | Refills: 0 | Status: SHIPPED | OUTPATIENT
Start: 2022-09-06

## 2022-09-06 RX ORDER — ATORVASTATIN CALCIUM 80 MG/1
80 TABLET, FILM COATED ORAL DAILY
Qty: 90 TABLET | Refills: 1 | Status: SHIPPED | OUTPATIENT
Start: 2022-09-06

## 2022-09-06 RX ORDER — FUROSEMIDE 80 MG
80 TABLET ORAL DAILY
Qty: 90 TABLET | Refills: 1 | Status: SHIPPED | OUTPATIENT
Start: 2022-09-06

## 2022-09-06 SDOH — ECONOMIC STABILITY: FOOD INSECURITY: WITHIN THE PAST 12 MONTHS, YOU WORRIED THAT YOUR FOOD WOULD RUN OUT BEFORE YOU GOT MONEY TO BUY MORE.: NEVER TRUE

## 2022-09-06 SDOH — ECONOMIC STABILITY: FOOD INSECURITY: WITHIN THE PAST 12 MONTHS, THE FOOD YOU BOUGHT JUST DIDN'T LAST AND YOU DIDN'T HAVE MONEY TO GET MORE.: NEVER TRUE

## 2022-09-06 ASSESSMENT — PATIENT HEALTH QUESTIONNAIRE - PHQ9
SUM OF ALL RESPONSES TO PHQ9 QUESTIONS 1 & 2: 0
SUM OF ALL RESPONSES TO PHQ QUESTIONS 1-9: 0
1. LITTLE INTEREST OR PLEASURE IN DOING THINGS: 0
2. FEELING DOWN, DEPRESSED OR HOPELESS: 0
SUM OF ALL RESPONSES TO PHQ QUESTIONS 1-9: 0

## 2022-09-06 ASSESSMENT — ENCOUNTER SYMPTOMS: SHORTNESS OF BREATH: 0

## 2022-09-06 ASSESSMENT — SOCIAL DETERMINANTS OF HEALTH (SDOH): HOW HARD IS IT FOR YOU TO PAY FOR THE VERY BASICS LIKE FOOD, HOUSING, MEDICAL CARE, AND HEATING?: NOT HARD AT ALL

## 2022-09-06 NOTE — TELEPHONE ENCOUNTER
Losartan e-prescribed to Pharmacy. Pt needs a follow up appointment with a new provider since Dr. Magalis Kinney is retiring.      Message sent to scheduling to schedule with New Provider

## 2022-09-06 NOTE — PROGRESS NOTES
several years. Patient denies dysphagia. Medical therapy in the past has included H2 antagonists and proton pump inhibitors. Symptoms are  waxing and waning. Discussed referral for EGD but he declined today        ASHD   Denies any angina. Followed by cardiology    Past Medical History:  Past Medical History:   Diagnosis Date    Arthritis     Benign essential hypertension     CAD (coronary artery disease)     MI in 12/07, 6/2017    Chronic pain     Dilated cardiomyopathy (HCC)     Gastrointestinal hemorrhage with hematemesis 6/20/2017    GERD (gastroesophageal reflux disease)     Heart failure (HCC)     Hyperlipidemia LDL goal <70     Psychiatric disorder     depression and anxiety    Renal insufficiency      Past Surgical History:  Past Surgical History:   Procedure Laterality Date    CORONARY ANGIOPLASTY WITH STENT PLACEMENT  06/2017    RCA    LEFT HEART CATH,PERCUTANEOUS  06/19/2017    STEMI & PCI    ORTHOPEDIC SURGERY      right femur ORIF surg    WISDOM TOOTH EXTRACTION       Allergies:    Allergies   Allergen Reactions    Abciximab Other (See Comments)     Thrombocytopenia    Lisinopril Swelling     Medications:   Current Outpatient Medications   Medication Sig Dispense Refill    atorvastatin (LIPITOR) 80 MG tablet Take 1 tablet by mouth daily TAKE 1 TABLET BY MOUTH DAILY 90 tablet 1    empagliflozin (JARDIANCE) 25 MG tablet Take 1 tablet by mouth daily 90 tablet 1    furosemide (LASIX) 80 MG tablet Take 1 tablet by mouth daily TAKE 1 TABLET BY MOUTH DAILY 90 tablet 1    pantoprazole (PROTONIX) 40 MG tablet Take 1 tablet by mouth 2 times daily (before meals) 180 tablet 1    SITagliptin-metFORMIN HCl ER (JANUMET XR) 100-1000 MG TB24 Take 1 tablet by mouth daily 90 tablet 1    carvedilol (COREG) 12.5 MG tablet TAKE 1 TABLET BY MOUTH TWICE DAILY 180 tablet 0    losartan (COZAAR) 100 MG tablet TAKE 1 TABLET BY MOUTH EVERY DAY 90 tablet 0    acebutolol (SECTRAL) 400 MG capsule Take 400 mg by mouth 2 times daily aspirin 81 MG chewable tablet Take 81 mg by mouth daily      nitroGLYCERIN (NITROSTAT) 0.4 MG SL tablet Place 0.4 mg under the tongue      prasugrel (EFFIENT) 10 MG TABS Take 10 mg by mouth daily       No current facility-administered medications for this visit. Social History:  Social History     Tobacco Use    Smoking status: Former     Packs/day: 0.25     Years: 15.00     Pack years: 3.75     Types: Cigarettes     Quit date: 2017     Years since quittin.2    Smokeless tobacco: Never   Substance Use Topics    Alcohol use: No     Family History  Family History   Problem Relation Age of Onset    Heart Disease Father     Hypertension Father     Cancer Father         possible prostate    Diabetes Mother     Hypertension Mother     Stroke Mother        Review of Systems   Constitutional:  Negative for appetite change, chills, fatigue and fever. Respiratory:  Negative for shortness of breath. Cardiovascular:  Negative for chest pain and leg swelling. Musculoskeletal:  Negative for gait problem. Skin:  Negative for rash. Neurological:  Negative for speech difficulty. Psychiatric/Behavioral:  Negative for dysphoric mood. Vital Signs  BP (!) 146/99 (Site: Left Upper Arm, Position: Sitting, Cuff Size: Large Adult)   Pulse 82   Temp 97.8 °F (36.6 °C) (Temporal)   Ht 6' (1.829 m)   Wt 232 lb (105.2 kg)   SpO2 98%   BMI 31.46 kg/m²   Body mass index is 31.46 kg/m². Physical Exam  Vitals reviewed. Constitutional:       General: He is not in acute distress. Appearance: Normal appearance. He is not ill-appearing. HENT:      Head: Normocephalic and atraumatic. Mouth/Throat:      Mouth: Mucous membranes are moist.   Eyes:      General: No scleral icterus. Extraocular Movements: Extraocular movements intact. Conjunctiva/sclera: Conjunctivae normal.   Neck:      Vascular: No carotid bruit. Cardiovascular:      Rate and Rhythm: Normal rate and regular rhythm. Heart sounds: Normal heart sounds. No murmur heard. Pulmonary:      Effort: Pulmonary effort is normal.      Breath sounds: Normal breath sounds. Musculoskeletal:         General: No swelling. Skin:     Coloration: Skin is not jaundiced. Findings: No rash. Neurological:      General: No focal deficit present. Mental Status: He is alert. Mental status is at baseline. Cranial Nerves: No cranial nerve deficit. Motor: No weakness. Gait: Gait normal.   Psychiatric:         Mood and Affect: Mood normal.         Behavior: Behavior normal.         Thought Content: Thought content normal.         Judgment: Judgment normal.         Assessment/Plan:  Rudine Burkitt was seen today for diabetes. Diagnoses and all orders for this visit:    Type 2 diabetes mellitus without complication, without long-term current use of insulin (HCC)  -     empagliflozin (JARDIANCE) 25 MG tablet; Take 1 tablet by mouth daily  -     SITagliptin-metFORMIN HCl ER (JANUMET XR) 100-1000 MG TB24; Take 1 tablet by mouth daily  -     Hemoglobin A1C; Future    Essential hypertension  -     furosemide (LASIX) 80 MG tablet; Take 1 tablet by mouth daily TAKE 1 TABLET BY MOUTH DAILY  -     CBC with Auto Differential; Future  -     Basic Metabolic Panel; Future    Gastroesophageal reflux disease, unspecified whether esophagitis present  -     pantoprazole (PROTONIX) 40 MG tablet; Take 1 tablet by mouth 2 times daily (before meals)    Other hyperlipidemia  -     atorvastatin (LIPITOR) 80 MG tablet; Take 1 tablet by mouth daily TAKE 1 TABLET BY MOUTH DAILY  -     Hepatic Function Panel; Future  -     Lipid Panel; Future    ASHD (arteriosclerotic heart disease)    Screening for prostate cancer  -     PSA Screening; Future      Return in about 4 months (around 1/6/2023), or if symptoms worsen or fail to improve.   __  Rigoberto Flowers M.D.

## 2022-10-01 NOTE — PROGRESS NOTES
[facility] CARDIOLOGY Follow Up                 Reason for Visit: Stable ischemic heart disease    Subjective:     Patient is a 50 y.o. male with a PMH of CAD status post PCI, hypertension, and hyperlipidemia who presents for follow-up. The patient was last seen by Dr. Leona Melissa in 2021. The patient had a TTE in 2017 that was noted to demonstrate a low normal EF. Mild MR was noted. He had an LHC in 2017. PCI of the RCA with BMSs. The patient denies angina and dyspnea. He reports that he has been under a lot of stress with the recent death of his daughter and family member. He reports that his blood pressure is usually well controlled.     Past Medical History:   Diagnosis Date    Arthritis     Benign essential hypertension     CAD (coronary artery disease)     MI in , 2017    Chronic pain     Dilated cardiomyopathy (HCC)     Gastrointestinal hemorrhage with hematemesis 2017    GERD (gastroesophageal reflux disease)     Heart failure (HCC)     Hyperlipidemia LDL goal <70     Psychiatric disorder     depression and anxiety    Renal insufficiency       Past Surgical History:   Procedure Laterality Date    CORONARY ANGIOPLASTY WITH STENT PLACEMENT  2017    RCA    LEFT HEART CATH,PERCUTANEOUS  2017    STEMI & PCI    ORTHOPEDIC SURGERY      right femur ORIF surg    WISDOM TOOTH EXTRACTION        Family History   Problem Relation Age of Onset    Heart Disease Father     Hypertension Father     Cancer Father         possible prostate    Diabetes Mother     Hypertension Mother     Stroke Mother       Social History     Tobacco Use    Smoking status: Former     Packs/day: 0.25     Years: 15.00     Pack years: 3.75     Types: Cigarettes     Quit date: 2017     Years since quittin.2    Smokeless tobacco: Never   Substance Use Topics    Alcohol use: No      Allergies   Allergen Reactions    Abciximab Other (See Comments)     Thrombocytopenia    Lisinopril Swelling         ROS:  No obvious pertinent positives on review of systems except for what was outlined above.        Objective:       BP (!) 128/98   Pulse 76   Wt 231 lb (104.8 kg) Comment: with shoes  BMI 31.33 kg/m²     BP Readings from Last 3 Encounters:   10/04/22 (!) 128/98   09/06/22 (!) 146/99   01/07/22 107/78       Wt Readings from Last 3 Encounters:   10/04/22 231 lb (104.8 kg)   09/06/22 232 lb (105.2 kg)   01/07/22 236 lb 9.6 oz (107.3 kg)       General/Constitutional:   Alert and oriented x 3, no acute distress  HEENT:   normocephalic, atraumatic, moist mucous membranes  Neck:   No JVD or carotid bruits bilaterally  Cardiovascular:   regular rate and rhythm, no rub/gallop appreciated  Pulmonary:   clear to auscultation bilaterally, no respiratory distress  Abdomen:   soft, non-tender, non-distended  Ext:   No sig LE edema bilaterally  Skin:  warm and dry, no obvious rashes seen  Neuro:   no obvious sensory or motor deficits  Psychiatric:   normal mood and affect      ECG:   Sinus rhythm  Nonspecific ST and/or T wave abnormalities  Heart rate 76 bpm    Data Review:   Lab Results   Component Value Date    CHOL 122 09/06/2022    CHOL 122 04/29/2021    CHOL 122 01/07/2020     Lab Results   Component Value Date    TRIG 109 09/06/2022    TRIG 57 04/29/2021    TRIG 58 01/07/2020     Lab Results   Component Value Date    HDL 35 (L) 09/06/2022    HDL 33 (L) 04/29/2021    HDL 32 (L) 01/07/2020     Lab Results   Component Value Date    LDLCALC 65.2 09/06/2022    LDLCALC 76 04/29/2021    LDLCALC 78 01/07/2020     Lab Results   Component Value Date    LABVLDL 21.8 09/06/2022    LABVLDL 12 01/07/2020    VLDL 13 04/29/2021     Lab Results   Component Value Date    CHOLHDLRATIO 3.5 09/06/2022        Lab Results   Component Value Date/Time     09/06/2022 09:14 AM     01/07/2022 08:51 AM     04/29/2021 08:41 AM    K 3.5 09/06/2022 09:14 AM    K 3.8 01/07/2022 08:51 AM    K 3.7 04/29/2021 08:41 AM     09/06/2022 09:14 AM     01/07/2022 08:51 AM     04/29/2021 08:41 AM    CO2 23 09/06/2022 09:14 AM    CO2 23 01/07/2022 08:51 AM    CO2 22 04/29/2021 08:41 AM    BUN 14 09/06/2022 09:14 AM    BUN 14 01/07/2022 08:51 AM    BUN 15 04/29/2021 08:41 AM    CREATININE 1.20 09/06/2022 09:14 AM    CREATININE 1.23 01/07/2022 08:51 AM    CREATININE 1.26 04/29/2021 08:41 AM    GLUCOSE 147 09/06/2022 09:14 AM    GLUCOSE 158 01/07/2022 08:51 AM    GLUCOSE 150 04/29/2021 08:41 AM    CALCIUM 8.8 09/06/2022 09:14 AM    CALCIUM 9.1 01/07/2022 08:51 AM    CALCIUM 9.0 04/29/2021 08:41 AM         Lab Results   Component Value Date    ALT 17 09/06/2022    ALT 18 04/29/2021    ALT 22 10/31/2020    AST 10 (L) 09/06/2022    AST 18 04/29/2021    AST 14 (L) 10/31/2020        Assessment/Plan:   1. CAD in native artery  - Patient completed 12 months of uninterrupted DAPT following stenting with no further indication for DAPT  - Discussed with patient that clopidogrel monotherapy may be superior to aspirin monotherapy as chronic maintenance therapy among patients who had successfully completed the required duration of DAPT therapy post-CLARA PCI (HOST-EXAM study)   - Discontinue baby aspirin and Effient  - Start Plavix 75 mg daily  - Continue with Lipitor    2. Hyperlipidemia, unspecified hyperlipidemia type  - Continue with Lipitor    3. Hypertension, unspecified type  - Poorly controlled in the setting of undue stress with recent death of daughter (patient reports it is \"usually good\")  - Patient on two beta blockers with no indication for acebutolol  - Discontinue acebutolol  - Continue with Coreg  - Recommend measuring BP at home and submitting measurements to clinic  - Currently on losartan    4.  Mild mitral regurgitation by prior echocardiogram  - Obtain an echocardiogram    F/U: 6 months    Carmen Terrell MD

## 2022-10-04 ENCOUNTER — OFFICE VISIT (OUTPATIENT)
Dept: CARDIOLOGY CLINIC | Age: 52
End: 2022-10-04
Payer: COMMERCIAL

## 2022-10-04 VITALS
DIASTOLIC BLOOD PRESSURE: 98 MMHG | SYSTOLIC BLOOD PRESSURE: 128 MMHG | WEIGHT: 231 LBS | BODY MASS INDEX: 31.33 KG/M2 | HEART RATE: 76 BPM

## 2022-10-04 DIAGNOSIS — I34.0 MILD MITRAL REGURGITATION BY PRIOR ECHOCARDIOGRAM: ICD-10-CM

## 2022-10-04 DIAGNOSIS — I25.10 CAD IN NATIVE ARTERY: Primary | ICD-10-CM

## 2022-10-04 DIAGNOSIS — E78.5 HYPERLIPIDEMIA, UNSPECIFIED HYPERLIPIDEMIA TYPE: ICD-10-CM

## 2022-10-04 DIAGNOSIS — I10 HYPERTENSION, UNSPECIFIED TYPE: ICD-10-CM

## 2022-10-04 PROCEDURE — 99214 OFFICE O/P EST MOD 30 MIN: CPT | Performed by: INTERNAL MEDICINE

## 2022-10-04 PROCEDURE — 93000 ELECTROCARDIOGRAM COMPLETE: CPT | Performed by: INTERNAL MEDICINE

## 2022-10-04 RX ORDER — CLOPIDOGREL BISULFATE 75 MG/1
75 TABLET ORAL DAILY
Qty: 90 TABLET | Refills: 3 | Status: SHIPPED | OUTPATIENT
Start: 2022-10-04

## 2022-10-19 ENCOUNTER — TELEPHONE (OUTPATIENT)
Dept: CARDIOLOGY CLINIC | Age: 52
End: 2022-10-19

## 2022-10-19 NOTE — TELEPHONE ENCOUNTER
----- Message from Lonnie Dejesus MD sent at 10/19/2022  4:23 PM EDT -----  Please let the patient know that the heart function is normal on ECHO. His diastolic function is normal by the measurements in the report. His RA and LA size is normal upon personal review. The aorta is insufficiently visualized to make the 3.7 cm measurement stated. He does have documentation of mild to moderate MR; therefore, a surveillance echocardiogram in 2 to 3 years is warranted.

## 2022-12-15 RX ORDER — CARVEDILOL 12.5 MG/1
TABLET ORAL
Qty: 180 TABLET | Refills: 3 | Status: SHIPPED | OUTPATIENT
Start: 2022-12-15

## 2022-12-15 RX ORDER — LOSARTAN POTASSIUM 100 MG/1
TABLET ORAL
Qty: 90 TABLET | Refills: 3 | Status: SHIPPED | OUTPATIENT
Start: 2022-12-15

## 2022-12-15 NOTE — TELEPHONE ENCOUNTER
MEDICATION REFILL REQUEST      Name of Medication:  losartan  Dose:    Frequency:    Quantity:    Days' supply:  80      Pharmacy Name/Location:  did not leave     MEDICATION REFILL REQUEST      Name of Medication:  carvedilol   Dose:    Frequency:    Quantity:    Days' supply:  90      Pharmacy Name/Location:  did not leave

## 2023-01-06 ENCOUNTER — OFFICE VISIT (OUTPATIENT)
Dept: INTERNAL MEDICINE CLINIC | Facility: CLINIC | Age: 53
End: 2023-01-06
Payer: COMMERCIAL

## 2023-01-06 VITALS
WEIGHT: 229 LBS | DIASTOLIC BLOOD PRESSURE: 100 MMHG | HEIGHT: 72 IN | TEMPERATURE: 97.2 F | HEART RATE: 79 BPM | OXYGEN SATURATION: 99 % | BODY MASS INDEX: 31.02 KG/M2 | SYSTOLIC BLOOD PRESSURE: 143 MMHG

## 2023-01-06 DIAGNOSIS — E78.49 OTHER HYPERLIPIDEMIA: ICD-10-CM

## 2023-01-06 DIAGNOSIS — Z12.11 SCREENING FOR COLORECTAL CANCER: ICD-10-CM

## 2023-01-06 DIAGNOSIS — E11.9 TYPE 2 DIABETES MELLITUS WITHOUT COMPLICATION, WITHOUT LONG-TERM CURRENT USE OF INSULIN (HCC): ICD-10-CM

## 2023-01-06 DIAGNOSIS — E11.9 TYPE 2 DIABETES MELLITUS WITHOUT COMPLICATION, WITHOUT LONG-TERM CURRENT USE OF INSULIN (HCC): Primary | ICD-10-CM

## 2023-01-06 DIAGNOSIS — I25.10 ASHD (ARTERIOSCLEROTIC HEART DISEASE): ICD-10-CM

## 2023-01-06 DIAGNOSIS — F34.1 DYSTHYMIA (OR DEPRESSIVE NEUROSIS): ICD-10-CM

## 2023-01-06 DIAGNOSIS — Z12.12 SCREENING FOR COLORECTAL CANCER: ICD-10-CM

## 2023-01-06 DIAGNOSIS — I10 ESSENTIAL HYPERTENSION: ICD-10-CM

## 2023-01-06 DIAGNOSIS — K21.9 GASTROESOPHAGEAL REFLUX DISEASE, UNSPECIFIED WHETHER ESOPHAGITIS PRESENT: ICD-10-CM

## 2023-01-06 LAB
ALBUMIN SERPL-MCNC: 3.9 G/DL (ref 3.5–5)
ALBUMIN/GLOB SERPL: 1.2 (ref 0.4–1.6)
ALP SERPL-CCNC: 114 U/L (ref 50–136)
ALT SERPL-CCNC: 28 U/L (ref 12–65)
ANION GAP SERPL CALC-SCNC: 6 MMOL/L (ref 2–11)
AST SERPL-CCNC: 13 U/L (ref 15–37)
BILIRUB DIRECT SERPL-MCNC: 0.1 MG/DL
BILIRUB SERPL-MCNC: 0.5 MG/DL (ref 0.2–1.1)
BUN SERPL-MCNC: 10 MG/DL (ref 6–23)
CALCIUM SERPL-MCNC: 9.1 MG/DL (ref 8.3–10.4)
CHLORIDE SERPL-SCNC: 108 MMOL/L (ref 101–110)
CHOLEST SERPL-MCNC: 128 MG/DL
CO2 SERPL-SCNC: 28 MMOL/L (ref 21–32)
CREAT SERPL-MCNC: 1.3 MG/DL (ref 0.8–1.5)
EST. AVERAGE GLUCOSE BLD GHB EST-MCNC: 134 MG/DL
GLOBULIN SER CALC-MCNC: 3.2 G/DL (ref 2.8–4.5)
GLUCOSE SERPL-MCNC: 153 MG/DL (ref 65–100)
HBA1C MFR BLD: 6.3 % (ref 4.8–5.6)
HDLC SERPL-MCNC: 34 MG/DL (ref 40–60)
HDLC SERPL: 3.8
LDLC SERPL CALC-MCNC: 80.2 MG/DL
POTASSIUM SERPL-SCNC: 3.5 MMOL/L (ref 3.5–5.1)
PROT SERPL-MCNC: 7.1 G/DL (ref 6.3–8.2)
SODIUM SERPL-SCNC: 142 MMOL/L (ref 133–143)
TRIGL SERPL-MCNC: 69 MG/DL (ref 35–150)
VLDLC SERPL CALC-MCNC: 13.8 MG/DL (ref 6–23)

## 2023-01-06 PROCEDURE — 3080F DIAST BP >= 90 MM HG: CPT | Performed by: INTERNAL MEDICINE

## 2023-01-06 PROCEDURE — 99214 OFFICE O/P EST MOD 30 MIN: CPT | Performed by: INTERNAL MEDICINE

## 2023-01-06 PROCEDURE — 3077F SYST BP >= 140 MM HG: CPT | Performed by: INTERNAL MEDICINE

## 2023-01-06 RX ORDER — FUROSEMIDE 80 MG
TABLET ORAL
Qty: 90 TABLET | Refills: 1 | Status: SHIPPED | OUTPATIENT
Start: 2023-01-06

## 2023-01-06 RX ORDER — ESCITALOPRAM OXALATE 10 MG/1
10 TABLET ORAL DAILY
Qty: 90 TABLET | Refills: 1 | Status: SHIPPED | OUTPATIENT
Start: 2023-01-06

## 2023-01-06 RX ORDER — ATORVASTATIN CALCIUM 80 MG/1
TABLET, FILM COATED ORAL
Qty: 90 TABLET | Refills: 1 | Status: SHIPPED | OUTPATIENT
Start: 2023-01-06

## 2023-01-06 RX ORDER — PANTOPRAZOLE SODIUM 40 MG/1
40 TABLET, DELAYED RELEASE ORAL
Qty: 180 TABLET | Refills: 1 | Status: SHIPPED | OUTPATIENT
Start: 2023-01-06

## 2023-01-06 RX ORDER — SITAGLIPTIN AND METFORMIN HYDROCHLORIDE 100; 1000 MG/1; MG/1
1 TABLET, FILM COATED, EXTENDED RELEASE ORAL DAILY
Qty: 90 TABLET | Refills: 1 | Status: SHIPPED | OUTPATIENT
Start: 2023-01-06

## 2023-01-06 ASSESSMENT — PATIENT HEALTH QUESTIONNAIRE - PHQ9
5. POOR APPETITE OR OVEREATING: 0
3. TROUBLE FALLING OR STAYING ASLEEP: 3
SUM OF ALL RESPONSES TO PHQ QUESTIONS 1-9: 8
7. TROUBLE CONCENTRATING ON THINGS, SUCH AS READING THE NEWSPAPER OR WATCHING TELEVISION: 0
1. LITTLE INTEREST OR PLEASURE IN DOING THINGS: 2
9. THOUGHTS THAT YOU WOULD BE BETTER OFF DEAD, OR OF HURTING YOURSELF: 0
SUM OF ALL RESPONSES TO PHQ QUESTIONS 1-9: 8
2. FEELING DOWN, DEPRESSED OR HOPELESS: 1
SUM OF ALL RESPONSES TO PHQ9 QUESTIONS 1 & 2: 3
8. MOVING OR SPEAKING SO SLOWLY THAT OTHER PEOPLE COULD HAVE NOTICED. OR THE OPPOSITE, BEING SO FIGETY OR RESTLESS THAT YOU HAVE BEEN MOVING AROUND A LOT MORE THAN USUAL: 1
4. FEELING TIRED OR HAVING LITTLE ENERGY: 1
10. IF YOU CHECKED OFF ANY PROBLEMS, HOW DIFFICULT HAVE THESE PROBLEMS MADE IT FOR YOU TO DO YOUR WORK, TAKE CARE OF THINGS AT HOME, OR GET ALONG WITH OTHER PEOPLE: 1
6. FEELING BAD ABOUT YOURSELF - OR THAT YOU ARE A FAILURE OR HAVE LET YOURSELF OR YOUR FAMILY DOWN: 0

## 2023-01-06 ASSESSMENT — ANXIETY QUESTIONNAIRES
GAD7 TOTAL SCORE: 15
2. NOT BEING ABLE TO STOP OR CONTROL WORRYING: 2
5. BEING SO RESTLESS THAT IT IS HARD TO SIT STILL: 2
3. WORRYING TOO MUCH ABOUT DIFFERENT THINGS: 2
7. FEELING AFRAID AS IF SOMETHING AWFUL MIGHT HAPPEN: 2
4. TROUBLE RELAXING: 3
IF YOU CHECKED OFF ANY PROBLEMS ON THIS QUESTIONNAIRE, HOW DIFFICULT HAVE THESE PROBLEMS MADE IT FOR YOU TO DO YOUR WORK, TAKE CARE OF THINGS AT HOME, OR GET ALONG WITH OTHER PEOPLE: SOMEWHAT DIFFICULT
1. FEELING NERVOUS, ANXIOUS, OR ON EDGE: 3
6. BECOMING EASILY ANNOYED OR IRRITABLE: 1

## 2023-01-06 ASSESSMENT — ENCOUNTER SYMPTOMS: SHORTNESS OF BREATH: 0

## 2023-01-06 NOTE — PROGRESS NOTES
[facility] M.D. Internal [facility]  [address] , [address]    Office : [phone]  Fax : [phone]    Chief Complaint   Patient presents with    Diabetes     4 mo follow up    Depression     Depression and Anxiety          History of Present Illness:  Yani Cerda is a 50 y.o. male. Depression  Visit Type: initial  Onset of symptoms: 1 to 6 months ago  Progression since onset: unchanged  Patient presents with the following symptoms: anhedonia, depressed mood, excessive worry, insomnia and nervousness/anxiety. Patient is not experiencing: shortness of breath, suicidal ideas and suicidal planning. Frequency of symptoms: constantly   Severity: moderate   Sleep quality: non-restorative  Nighttime awakenings: several  Daughter  in the last 6 months  and his mother  before that    Diabetes Mellitus  Patient presents  for follow up on diabetes. Onset of symptoms was several years ago. Patient describes symptoms as none. Course to date has been well controlled. Diet and Lifestyle: follows a diabetic diet regularly  exercises sporadically  nonsmoker  Home glucose monitoring:  Results average n/a. Patient denies polyuria and polydipsia. No wounds in lower limbs. Most recent HbA1c was   Hemoglobin A1C   Date Value Ref Range Status   2022 6.3 (H) 4.8 - 5.6 % Final       Hypertension  Patient is in for Hypertension which is stable. Has been elevated due to stress and grieving  Diet and Lifestyle: generally follows a low sodium diet  Home BP Monitoring: none. Patient's recent blood pressures were   BP Readings from Last 3 Encounters:   23 (!) 143/100   10/19/22 (!) 132/90   10/04/22 (!) 128/98   . taking medications as instructed, no medication side effects noted, no TIA's, no chest pain on exertion, no dyspnea on exertion, no swelling of ankles.  Cardiac risk factors consist of advanced age (older than 54 for men, 72 for women), diabetes mellitus, dyslipidemia, hypertension, and male gender. Hyperlipidemia  Patient is in for follow-up for hyperlipidemia. Diet and Lifestyle: nonsmoker. Risk factors for vascular disease consist of hyperlipidemia, coronary artery disease, hypertension, and diabetes. LAST LDL was   Lab Results   Component Value Date    LDLCALC 65.2 09/06/2022   . Last ALT was   Lab Results   Component Value Date/Time    ALT 17 09/06/2022 09:14 AM   .  No muscle aches. GERD  He complains of gastroesophageal reflux with cough and heartburn. Symptoms have been present for several years. Patient denies dysphagia. Medical therapy in the past has included H2 antagonists and proton pump inhibitors. Symptoms are  improved       ASHD   Denies any angina. Followed by cardiology    Past Medical History:  Past Medical History:   Diagnosis Date    Arthritis     Benign essential hypertension     CAD (coronary artery disease)     MI in 12/07, 6/2017    Chronic pain     Dilated cardiomyopathy (HCC)     Gastrointestinal hemorrhage with hematemesis 6/20/2017    GERD (gastroesophageal reflux disease)     Heart failure (HCC)     Hyperlipidemia LDL goal <70     Psychiatric disorder     depression and anxiety    Renal insufficiency      Past Surgical History:  Past Surgical History:   Procedure Laterality Date    CORONARY ANGIOPLASTY WITH STENT PLACEMENT  06/2017    RCA    LEFT HEART CATH,PERCUTANEOUS  06/19/2017    STEMI & PCI    ORTHOPEDIC SURGERY      right femur ORIF surg    WISDOM TOOTH EXTRACTION       Allergies:    Allergies   Allergen Reactions    Abciximab Other (See Comments)     Thrombocytopenia    Lisinopril Swelling     Medications:   Current Outpatient Medications   Medication Sig Dispense Refill    atorvastatin (LIPITOR) 80 MG tablet TAKE 1 TABLET BY MOUTH DAILY 90 tablet 1    empagliflozin (JARDIANCE) 25 MG tablet Take 1 tablet by mouth daily 90 tablet 1    furosemide (LASIX) 80 MG tablet TAKE 1 TABLET BY MOUTH DAILY 90 tablet 1    pantoprazole (PROTONIX) 40 MG tablet Take 1 tablet by mouth 2 times daily (before meals) 180 tablet 1    SITagliptin-metFORMIN HCl ER (JANUMET XR) 100-1000 MG TB24 Take 1 tablet by mouth daily 90 tablet 1    escitalopram (LEXAPRO) 10 MG tablet Take 1 tablet by mouth daily 90 tablet 1    losartan (COZAAR) 100 MG tablet TAKE 1 TABLET BY MOUTH EVERY DAY 90 tablet 3    carvedilol (COREG) 12.5 MG tablet TAKE 1 TABLET BY MOUTH TWICE DAILY 180 tablet 3    clopidogrel (PLAVIX) 75 MG tablet Take 1 tablet by mouth daily 90 tablet 3    nitroGLYCERIN (NITROSTAT) 0.4 MG SL tablet Place 0.4 mg under the tongue       No current facility-administered medications for this visit. Social History:  Social History     Tobacco Use    Smoking status: Former     Packs/day: 0.25     Years: 15.00     Pack years: 3.75     Types: Cigarettes     Quit date: 2017     Years since quittin.5    Smokeless tobacco: Never   Substance Use Topics    Alcohol use: No     Family History  Family History   Problem Relation Age of Onset    Heart Disease Father     Hypertension Father     Cancer Father         possible prostate    Diabetes Mother     Hypertension Mother     Stroke Mother        Review of Systems   Constitutional:  Negative for chills, fatigue and fever. Respiratory:  Negative for shortness of breath. Cardiovascular:  Negative for chest pain and leg swelling. Skin:  Positive for rash. Neurological:  Negative for speech difficulty. Psychiatric/Behavioral:  Positive for depression, dysphoric mood and sleep disturbance. Negative for self-injury and suicidal ideas. The patient is nervous/anxious and has insomnia. Vital Signs  BP (!) 143/100 (Site: Left Upper Arm, Position: Sitting, Cuff Size: Large Adult)   Pulse 79   Temp 97.2 °F (36.2 °C) (Temporal)   Ht 6' (1.829 m)   Wt 229 lb (103.9 kg)   SpO2 99%   BMI 31.06 kg/m²   Body mass index is 31.06 kg/m². Physical Exam  Vitals reviewed. Constitutional:       General: He is not in acute distress. Appearance: Normal appearance. He is not ill-appearing. HENT:      Head: Normocephalic and atraumatic. Eyes:      General: No scleral icterus. Conjunctiva/sclera: Conjunctivae normal.   Neck:      Vascular: No carotid bruit. Cardiovascular:      Rate and Rhythm: Normal rate and regular rhythm. Heart sounds: Normal heart sounds. No murmur heard. Pulmonary:      Effort: Pulmonary effort is normal.      Breath sounds: Normal breath sounds. Musculoskeletal:         General: No swelling. Skin:     Coloration: Skin is not jaundiced. Findings: No rash. Neurological:      General: No focal deficit present. Mental Status: He is alert. Mental status is at baseline. Cranial Nerves: No cranial nerve deficit. Motor: No weakness. Gait: Gait normal.   Psychiatric:         Mood and Affect: Mood is depressed. Affect is tearful. Speech: Speech normal.         Behavior: Behavior is slowed. Thought Content: Thought content normal.         Cognition and Memory: Cognition and memory normal.         Judgment: Judgment normal.         Assessment/Plan:  Jayne Jean Baptiste was seen today for diabetes and depression. Diagnoses and all orders for this visit:    Type 2 diabetes mellitus without complication, without long-term current use of insulin (HCC)  -     empagliflozin (JARDIANCE) 25 MG tablet; Take 1 tablet by mouth daily  -     SITagliptin-metFORMIN HCl ER (JANUMET XR) 100-1000 MG TB24; Take 1 tablet by mouth daily  -     Hemoglobin A1C; Future    Essential hypertension  -     furosemide (LASIX) 80 MG tablet; TAKE 1 TABLET BY MOUTH DAILY  -     CBC with Auto Differential; Future  -     Basic Metabolic Panel; Future    Other hyperlipidemia  -     atorvastatin (LIPITOR) 80 MG tablet; TAKE 1 TABLET BY MOUTH DAILY  -     Lipid Panel; Future  -     Hepatic Function Panel;  Future    Gastroesophageal reflux disease, unspecified whether esophagitis present  -     pantoprazole (PROTONIX) 40 MG tablet; Take 1 tablet by mouth 2 times daily (before meals)    ASHD (arteriosclerotic heart disease)    Dysthymia (or depressive neurosis)  -     escitalopram (LEXAPRO) 10 MG tablet; Take 1 tablet by mouth daily    Screening for colorectal cancer  -     [facility]      Return in about 4 months (around 5/6/2023), or if symptoms worsen or fail to improve.   __  Clary Parmar M.D.

## 2023-01-07 LAB
BASOPHILS # BLD: 0.1 K/UL (ref 0–0.2)
BASOPHILS NFR BLD: 1 % (ref 0–2)
DIFFERENTIAL METHOD BLD: ABNORMAL
EOSINOPHIL # BLD: 0.2 K/UL (ref 0–0.8)
EOSINOPHIL NFR BLD: 3 % (ref 0.5–7.8)
ERYTHROCYTE [DISTWIDTH] IN BLOOD BY AUTOMATED COUNT: 13.7 % (ref 11.9–14.6)
HCT VFR BLD AUTO: 52.4 % (ref 41.1–50.3)
HGB BLD-MCNC: 17 G/DL (ref 13.6–17.2)
IMM GRANULOCYTES # BLD AUTO: 0 K/UL (ref 0–0.5)
IMM GRANULOCYTES NFR BLD AUTO: 0 % (ref 0–5)
LYMPHOCYTES # BLD: 1.5 K/UL (ref 0.5–4.6)
LYMPHOCYTES NFR BLD: 24 % (ref 13–44)
MCH RBC QN AUTO: 30.1 PG (ref 26.1–32.9)
MCHC RBC AUTO-ENTMCNC: 32.4 G/DL (ref 31.4–35)
MCV RBC AUTO: 92.9 FL (ref 82–102)
MONOCYTES # BLD: 0.5 K/UL (ref 0.1–1.3)
MONOCYTES NFR BLD: 7 % (ref 4–12)
NEUTS SEG # BLD: 4.2 K/UL (ref 1.7–8.2)
NEUTS SEG NFR BLD: 65 % (ref 43–78)
NRBC # BLD: 0 K/UL (ref 0–0.2)
PLATELET # BLD AUTO: 223 K/UL (ref 150–450)
PMV BLD AUTO: 9.2 FL (ref 9.4–12.3)
RBC # BLD AUTO: 5.64 M/UL (ref 4.23–5.6)
WBC # BLD AUTO: 6.4 K/UL (ref 4.3–11.1)

## 2023-01-27 ENCOUNTER — OFFICE VISIT (OUTPATIENT)
Dept: INTERNAL MEDICINE CLINIC | Facility: CLINIC | Age: 53
End: 2023-01-27
Payer: COMMERCIAL

## 2023-01-27 VITALS
HEIGHT: 73 IN | SYSTOLIC BLOOD PRESSURE: 134 MMHG | DIASTOLIC BLOOD PRESSURE: 95 MMHG | OXYGEN SATURATION: 97 % | BODY MASS INDEX: 30.48 KG/M2 | WEIGHT: 230 LBS | HEART RATE: 84 BPM | TEMPERATURE: 97.5 F

## 2023-01-27 DIAGNOSIS — I10 ESSENTIAL HYPERTENSION: ICD-10-CM

## 2023-01-27 DIAGNOSIS — E78.49 OTHER HYPERLIPIDEMIA: ICD-10-CM

## 2023-01-27 DIAGNOSIS — E11.9 TYPE 2 DIABETES MELLITUS WITHOUT COMPLICATION, WITHOUT LONG-TERM CURRENT USE OF INSULIN (HCC): Primary | ICD-10-CM

## 2023-01-27 DIAGNOSIS — I25.10 ASHD (ARTERIOSCLEROTIC HEART DISEASE): ICD-10-CM

## 2023-01-27 DIAGNOSIS — K21.9 GASTROESOPHAGEAL REFLUX DISEASE, UNSPECIFIED WHETHER ESOPHAGITIS PRESENT: ICD-10-CM

## 2023-01-27 PROCEDURE — 3080F DIAST BP >= 90 MM HG: CPT | Performed by: INTERNAL MEDICINE

## 2023-01-27 PROCEDURE — 3075F SYST BP GE 130 - 139MM HG: CPT | Performed by: INTERNAL MEDICINE

## 2023-01-27 PROCEDURE — 3044F HG A1C LEVEL LT 7.0%: CPT | Performed by: INTERNAL MEDICINE

## 2023-01-27 PROCEDURE — 99214 OFFICE O/P EST MOD 30 MIN: CPT | Performed by: INTERNAL MEDICINE

## 2023-01-27 RX ORDER — CARVEDILOL 25 MG/1
TABLET ORAL
Qty: 180 TABLET | Refills: 1 | Status: SHIPPED | OUTPATIENT
Start: 2023-01-27

## 2023-01-27 ASSESSMENT — PATIENT HEALTH QUESTIONNAIRE - PHQ9
4. FEELING TIRED OR HAVING LITTLE ENERGY: 1
8. MOVING OR SPEAKING SO SLOWLY THAT OTHER PEOPLE COULD HAVE NOTICED. OR THE OPPOSITE, BEING SO FIGETY OR RESTLESS THAT YOU HAVE BEEN MOVING AROUND A LOT MORE THAN USUAL: 0
SUM OF ALL RESPONSES TO PHQ QUESTIONS 1-9: 3
5. POOR APPETITE OR OVEREATING: 0
2. FEELING DOWN, DEPRESSED OR HOPELESS: 1
3. TROUBLE FALLING OR STAYING ASLEEP: 1
9. THOUGHTS THAT YOU WOULD BE BETTER OFF DEAD, OR OF HURTING YOURSELF: 0
SUM OF ALL RESPONSES TO PHQ QUESTIONS 1-9: 3
10. IF YOU CHECKED OFF ANY PROBLEMS, HOW DIFFICULT HAVE THESE PROBLEMS MADE IT FOR YOU TO DO YOUR WORK, TAKE CARE OF THINGS AT HOME, OR GET ALONG WITH OTHER PEOPLE: 1
6. FEELING BAD ABOUT YOURSELF - OR THAT YOU ARE A FAILURE OR HAVE LET YOURSELF OR YOUR FAMILY DOWN: 0
SUM OF ALL RESPONSES TO PHQ QUESTIONS 1-9: 3
SUM OF ALL RESPONSES TO PHQ QUESTIONS 1-9: 3
7. TROUBLE CONCENTRATING ON THINGS, SUCH AS READING THE NEWSPAPER OR WATCHING TELEVISION: 0

## 2023-01-27 ASSESSMENT — ANXIETY QUESTIONNAIRES
6. BECOMING EASILY ANNOYED OR IRRITABLE: 0
5. BEING SO RESTLESS THAT IT IS HARD TO SIT STILL: 0
3. WORRYING TOO MUCH ABOUT DIFFERENT THINGS: 1
4. TROUBLE RELAXING: 1
1. FEELING NERVOUS, ANXIOUS, OR ON EDGE: 1
7. FEELING AFRAID AS IF SOMETHING AWFUL MIGHT HAPPEN: 0
IF YOU CHECKED OFF ANY PROBLEMS ON THIS QUESTIONNAIRE, HOW DIFFICULT HAVE THESE PROBLEMS MADE IT FOR YOU TO DO YOUR WORK, TAKE CARE OF THINGS AT HOME, OR GET ALONG WITH OTHER PEOPLE: NOT DIFFICULT AT ALL
2. NOT BEING ABLE TO STOP OR CONTROL WORRYING: 1
GAD7 TOTAL SCORE: 4

## 2023-01-27 ASSESSMENT — ENCOUNTER SYMPTOMS
FACIAL SWELLING: 0
SHORTNESS OF BREATH: 0
EYE REDNESS: 0

## 2023-01-27 NOTE — PROGRESS NOTES
Herminio Magana M.D. Internal Medicine  [address] [place] [address]  Office : [phone]  Fax : [phone]    Chief Complaint   Patient presents with    Hypertension         History of Present Illness:  Cathy Restrepo is a 50 y.o. male. HPI    Diabetes Mellitus  Patient presents  for follow up on diabetes. Onset of symptoms was several years ago. Patient describes symptoms as none. Course to date has been well controlled. Diet and Lifestyle: follows a diabetic diet regularly  exercises sporadically  nonsmoker  Home glucose monitoring:  Results average n/a. Patient denies polyuria and polydipsia. No wounds in lower limbs. Most recent HbA1c was   Hemoglobin A1C   Date Value Ref Range Status   01/06/2023 6.3 (H) 4.8 - 5.6 % Final       Hypertension  Patient is in for Hypertension which is stable. Diet and Lifestyle: generally follows a low sodium diet  Home BP Monitoring: is not measured at home. Patient's recent blood pressures were   BP Readings from Last 3 Encounters:   01/27/23 (!) 134/95   01/06/23 (!) 143/100   10/19/22 (!) 132/90   . taking medications as instructed, no medication side effects noted, no TIA's, no chest pain on exertion, no dyspnea on exertion, no swelling of ankles. Cardiac risk factors consist of diabetes mellitus, dyslipidemia, hypertension, and male gender. Lab Results   Component Value Date/Time     01/06/2023 09:10 AM    K 3.5 01/06/2023 09:10 AM     01/06/2023 09:10 AM    CO2 28 01/06/2023 09:10 AM    BUN 10 01/06/2023 09:10 AM    CREATININE 1.30 01/06/2023 09:10 AM    GLUCOSE 153 01/06/2023 09:10 AM    CALCIUM 9.1 01/06/2023 09:10 AM        Hyperlipidemia  Patient is in for follow-up for hyperlipidemia. Diet and Lifestyle: nonsmoker. Risk factors for vascular disease consist of hyperlipidemia, coronary artery disease, hypertension, and diabetes.   LAST LDL was   Lab Results   Component Value Date    LDLCALC 80.2 01/06/2023   . Last ALT was   Lab Results   Component Value Date/Time    ALT 28 01/06/2023 09:10 AM   .  No muscle aches. GERD  He complains of gastroesophageal reflux with cough and heartburn. Symptoms have been present for several years. Patient denies dysphagia. Medical therapy in the past has included H2 antagonists and proton pump inhibitors. Symptoms are  improved     ASHD   Denies any angina. Followed by cardiology    Grieving  Could not tolerate lexapro and has worked through the grieving process so he does not believe he needs anything now. He neds a  form completed for his grandchildren    Past Medical History:  Past Medical History:   Diagnosis Date    Arthritis     Benign essential hypertension     CAD (coronary artery disease)     MI in 12/07, 6/2017    Chronic pain     Dilated cardiomyopathy (HCC)     Gastrointestinal hemorrhage with hematemesis 6/20/2017    GERD (gastroesophageal reflux disease)     Heart failure (HCC)     Hyperlipidemia LDL goal <70     Psychiatric disorder     depression and anxiety    Renal insufficiency      Past Surgical History:  Past Surgical History:   Procedure Laterality Date    CORONARY ANGIOPLASTY WITH STENT PLACEMENT  06/2017    RCA    LEFT HEART CATH,PERCUTANEOUS  06/19/2017    STEMI & PCI    ORTHOPEDIC SURGERY      right femur ORIF surg    WISDOM TOOTH EXTRACTION       Allergies:    Allergies   Allergen Reactions    Abciximab Other (See Comments)     Thrombocytopenia    Lisinopril Swelling     Medications:   Current Outpatient Medications   Medication Sig Dispense Refill    carvedilol (COREG) 25 MG tablet TAKE 1 TABLET BY MOUTH TWICE DAILY 180 tablet 1    atorvastatin (LIPITOR) 80 MG tablet TAKE 1 TABLET BY MOUTH DAILY 90 tablet 1    empagliflozin (JARDIANCE) 25 MG tablet Take 1 tablet by mouth daily 90 tablet 1    furosemide (LASIX) 80 MG tablet TAKE 1 TABLET BY MOUTH DAILY 90 tablet 1    pantoprazole (PROTONIX) 40 MG tablet Take 1 tablet by mouth 2 times daily (before meals) 180 tablet 1    SITagliptin-metFORMIN HCl ER (JANUMET XR) 100-1000 MG TB24 Take 1 tablet by mouth daily 90 tablet 1    losartan (COZAAR) 100 MG tablet TAKE 1 TABLET BY MOUTH EVERY DAY 90 tablet 3    clopidogrel (PLAVIX) 75 MG tablet Take 1 tablet by mouth daily 90 tablet 3    nitroGLYCERIN (NITROSTAT) 0.4 MG SL tablet Place 0.4 mg under the tongue       No current facility-administered medications for this visit.     Social History:  Social History     Tobacco Use    Smoking status: Former     Packs/day: 0.25     Years: 15.00     Pack years: 3.75     Types: Cigarettes     Quit date: 2017     Years since quittin.6    Smokeless tobacco: Never   Substance Use Topics    Alcohol use: No     Family History  Family History   Problem Relation Age of Onset    Heart Disease Father     Hypertension Father     Cancer Father         possible prostate    Diabetes Mother     Hypertension Mother     Stroke Mother        Review of Systems   Constitutional:  Negative for chills, fatigue and fever.   HENT:  Negative for facial swelling.    Eyes:  Negative for redness.   Respiratory:  Negative for shortness of breath.    Cardiovascular:  Negative for chest pain and leg swelling.   Musculoskeletal:  Positive for arthralgias. Negative for gait problem.   Skin:  Negative for rash.   Neurological:  Negative for speech difficulty.   Psychiatric/Behavioral:  Negative for dysphoric mood.        Vital Signs  BP (!) 134/95 (Site: Left Upper Arm)   Pulse 84   Temp 97.5 °F (36.4 °C) (Temporal)   Ht 6' 1\" (1.854 m)   Wt 230 lb (104.3 kg)   SpO2 97%   BMI 30.34 kg/m²   Body mass index is 30.34 kg/m².    Physical Exam  Vitals reviewed.   Constitutional:       General: He is not in acute distress.     Appearance: Normal appearance. He is not ill-appearing.   HENT:      Head: Normocephalic and atraumatic.      Nose: Nose normal.      Mouth/Throat:      Mouth: Mucous membranes are moist.  Eyes:      General: No scleral icterus. Conjunctiva/sclera: Conjunctivae normal.   Neck:      Vascular: No carotid bruit. Cardiovascular:      Rate and Rhythm: Normal rate and regular rhythm. Heart sounds: Normal heart sounds. No murmur heard. Pulmonary:      Effort: Pulmonary effort is normal.      Breath sounds: Normal breath sounds. Musculoskeletal:         General: No swelling. Skin:     Coloration: Skin is not jaundiced. Findings: No rash. Neurological:      General: No focal deficit present. Mental Status: He is alert. Mental status is at baseline. Cranial Nerves: No cranial nerve deficit. Motor: No weakness. Gait: Gait normal.   Psychiatric:         Mood and Affect: Mood normal.         Behavior: Behavior normal.         Thought Content: Thought content normal.         Judgment: Judgment normal.         Assessment/Plan:  Margarita Trent was seen today for hypertension. Diagnoses and all orders for this visit:    Type 2 diabetes mellitus without complication, without long-term current use of insulin (HCC)    Essential hypertension  -     carvedilol (COREG) 25 MG tablet; TAKE 1 TABLET BY MOUTH TWICE DAILY    Other hyperlipidemia    ASHD (arteriosclerotic heart disease)    Gastroesophageal reflux disease, unspecified whether esophagitis present  Continue all current medication    Return if symptoms worsen or fail to improve.   __  Diego Virgen M.D.

## 2023-02-02 ENCOUNTER — TELEPHONE (OUTPATIENT)
Dept: INTERNAL MEDICINE CLINIC | Facility: CLINIC | Age: 53
End: 2023-02-02

## 2023-02-02 NOTE — TELEPHONE ENCOUNTER
Called patient and notified him that he needs to be seen patient stated he will call back for an appointment

## 2023-02-02 NOTE — TELEPHONE ENCOUNTER
----- Message from Sathya Dee sent at 2/2/2023  9:23 AM EST -----  Subject: Message to Provider    QUESTIONS  Information for Provider? Patient states that he needs paperwork that   documents that he is disabled. Patient states that he needs a letter   signed and written by Dr. Carlee Quiñones so that he can qualify for a program.   Please advise.  ---------------------------------------------------------------------------  --------------  [id number] [facility]  [phone]; OK to leave message on voicemail  ---------------------------------------------------------------------------  --------------  SCRIPT ANSWERS  Relationship to Patient?  Self

## 2023-02-06 ENCOUNTER — OFFICE VISIT (OUTPATIENT)
Dept: INTERNAL MEDICINE CLINIC | Facility: CLINIC | Age: 53
End: 2023-02-06
Payer: COMMERCIAL

## 2023-02-06 VITALS
HEART RATE: 86 BPM | SYSTOLIC BLOOD PRESSURE: 142 MMHG | WEIGHT: 233 LBS | HEIGHT: 73 IN | OXYGEN SATURATION: 98 % | DIASTOLIC BLOOD PRESSURE: 91 MMHG | TEMPERATURE: 97.7 F | BODY MASS INDEX: 30.88 KG/M2

## 2023-02-06 DIAGNOSIS — E11.9 TYPE 2 DIABETES MELLITUS WITHOUT COMPLICATION, WITHOUT LONG-TERM CURRENT USE OF INSULIN (HCC): Primary | ICD-10-CM

## 2023-02-06 DIAGNOSIS — I25.10 ATHEROSCLEROSIS OF NATIVE CORONARY ARTERY OF NATIVE HEART WITHOUT ANGINA PECTORIS: ICD-10-CM

## 2023-02-06 PROCEDURE — 99212 OFFICE O/P EST SF 10 MIN: CPT | Performed by: INTERNAL MEDICINE

## 2023-02-06 PROCEDURE — 3077F SYST BP >= 140 MM HG: CPT | Performed by: INTERNAL MEDICINE

## 2023-02-06 PROCEDURE — 3080F DIAST BP >= 90 MM HG: CPT | Performed by: INTERNAL MEDICINE

## 2023-02-06 PROCEDURE — 3044F HG A1C LEVEL LT 7.0%: CPT | Performed by: INTERNAL MEDICINE

## 2023-02-06 SDOH — ECONOMIC STABILITY: HOUSING INSECURITY
IN THE LAST 12 MONTHS, WAS THERE A TIME WHEN YOU DID NOT HAVE A STEADY PLACE TO SLEEP OR SLEPT IN A SHELTER (INCLUDING NOW)?: NO

## 2023-02-06 SDOH — ECONOMIC STABILITY: INCOME INSECURITY: HOW HARD IS IT FOR YOU TO PAY FOR THE VERY BASICS LIKE FOOD, HOUSING, MEDICAL CARE, AND HEATING?: NOT VERY HARD

## 2023-02-06 SDOH — ECONOMIC STABILITY: FOOD INSECURITY: WITHIN THE PAST 12 MONTHS, YOU WORRIED THAT YOUR FOOD WOULD RUN OUT BEFORE YOU GOT MONEY TO BUY MORE.: NEVER TRUE

## 2023-02-06 SDOH — ECONOMIC STABILITY: FOOD INSECURITY: WITHIN THE PAST 12 MONTHS, THE FOOD YOU BOUGHT JUST DIDN'T LAST AND YOU DIDN'T HAVE MONEY TO GET MORE.: NEVER TRUE

## 2023-02-06 ASSESSMENT — PATIENT HEALTH QUESTIONNAIRE - PHQ9
1. LITTLE INTEREST OR PLEASURE IN DOING THINGS: 0
SUM OF ALL RESPONSES TO PHQ QUESTIONS 1-9: 0
SUM OF ALL RESPONSES TO PHQ QUESTIONS 1-9: 0
6. FEELING BAD ABOUT YOURSELF - OR THAT YOU ARE A FAILURE OR HAVE LET YOURSELF OR YOUR FAMILY DOWN: 0
SUM OF ALL RESPONSES TO PHQ QUESTIONS 1-9: 0
8. MOVING OR SPEAKING SO SLOWLY THAT OTHER PEOPLE COULD HAVE NOTICED. OR THE OPPOSITE, BEING SO FIGETY OR RESTLESS THAT YOU HAVE BEEN MOVING AROUND A LOT MORE THAN USUAL: 0
3. TROUBLE FALLING OR STAYING ASLEEP: 0
SUM OF ALL RESPONSES TO PHQ QUESTIONS 1-9: 0
9. THOUGHTS THAT YOU WOULD BE BETTER OFF DEAD, OR OF HURTING YOURSELF: 0
SUM OF ALL RESPONSES TO PHQ9 QUESTIONS 1 & 2: 0
4. FEELING TIRED OR HAVING LITTLE ENERGY: 0
10. IF YOU CHECKED OFF ANY PROBLEMS, HOW DIFFICULT HAVE THESE PROBLEMS MADE IT FOR YOU TO DO YOUR WORK, TAKE CARE OF THINGS AT HOME, OR GET ALONG WITH OTHER PEOPLE: 0
2. FEELING DOWN, DEPRESSED OR HOPELESS: 0
7. TROUBLE CONCENTRATING ON THINGS, SUCH AS READING THE NEWSPAPER OR WATCHING TELEVISION: 0
5. POOR APPETITE OR OVEREATING: 0

## 2023-02-06 ASSESSMENT — ANXIETY QUESTIONNAIRES
5. BEING SO RESTLESS THAT IT IS HARD TO SIT STILL: 0
4. TROUBLE RELAXING: 0
6. BECOMING EASILY ANNOYED OR IRRITABLE: 0
3. WORRYING TOO MUCH ABOUT DIFFERENT THINGS: 0
7. FEELING AFRAID AS IF SOMETHING AWFUL MIGHT HAPPEN: 0
GAD7 TOTAL SCORE: 0
1. FEELING NERVOUS, ANXIOUS, OR ON EDGE: 0
IF YOU CHECKED OFF ANY PROBLEMS ON THIS QUESTIONNAIRE, HOW DIFFICULT HAVE THESE PROBLEMS MADE IT FOR YOU TO DO YOUR WORK, TAKE CARE OF THINGS AT HOME, OR GET ALONG WITH OTHER PEOPLE: NOT DIFFICULT AT ALL
2. NOT BEING ABLE TO STOP OR CONTROL WORRYING: 0

## 2023-02-06 ASSESSMENT — ENCOUNTER SYMPTOMS: SHORTNESS OF BREATH: 0

## 2023-02-06 NOTE — PROGRESS NOTES
Loki Herbert M.D. Internal Medicine  [address] [facility] , [address]   Office : [phone]  Fax : [phone]    Chief Complaint   Patient presents with    Forms     Paper work for Disability       History of Present Illness:  Wallace Jha is a 50 y.o. male. HPI    On disability due to cardiac condition and diabetes per social security. Needs a form for DSS completed. Determined he could perform mainly light work, desk work with limited lifting based on his disability of ASHD with diabetes per [employer]. He presented paperwork from [facility] showing his disability paperwork    Past Medical History:  Past Medical History:   Diagnosis Date    Arthritis     Benign essential hypertension     CAD (coronary artery disease)     MI in 12/07, 6/2017    Chronic pain     Dilated cardiomyopathy (HCC)     Gastrointestinal hemorrhage with hematemesis 6/20/2017    GERD (gastroesophageal reflux disease)     Heart failure (HCC)     Hyperlipidemia LDL goal <70     Psychiatric disorder     depression and anxiety    Renal insufficiency      Past Surgical History:  Past Surgical History:   Procedure Laterality Date    CORONARY ANGIOPLASTY WITH STENT PLACEMENT  06/2017    RCA    LEFT HEART CATH,PERCUTANEOUS  06/19/2017    STEMI & PCI    ORTHOPEDIC SURGERY      right femur ORIF surg    WISDOM TOOTH EXTRACTION       Allergies:    Allergies   Allergen Reactions    Abciximab Other (See Comments)     Thrombocytopenia    Lisinopril Swelling     Medications:   Current Outpatient Medications   Medication Sig Dispense Refill    carvedilol (COREG) 25 MG tablet TAKE 1 TABLET BY MOUTH TWICE DAILY 180 tablet 1    atorvastatin (LIPITOR) 80 MG tablet TAKE 1 TABLET BY MOUTH DAILY 90 tablet 1    empagliflozin (JARDIANCE) 25 MG tablet Take 1 tablet by mouth daily 90 tablet 1    furosemide (LASIX) 80 MG tablet TAKE 1 TABLET BY MOUTH DAILY 90 tablet 1    pantoprazole (PROTONIX) 40 MG tablet Take 1 tablet by mouth 2 times daily (before meals) 180 tablet 1    SITagliptin-metFORMIN HCl ER (JANUMET XR) 100-1000 MG TB24 Take 1 tablet by mouth daily 90 tablet 1    losartan (COZAAR) 100 MG tablet TAKE 1 TABLET BY MOUTH EVERY DAY 90 tablet 3    clopidogrel (PLAVIX) 75 MG tablet Take 1 tablet by mouth daily 90 tablet 3    nitroGLYCERIN (NITROSTAT) 0.4 MG SL tablet Place 0.4 mg under the tongue       No current facility-administered medications for this visit. Social History:  Social History     Tobacco Use    Smoking status: Former     Packs/day: 0.25     Years: 15.00     Pack years: 3.75     Types: Cigarettes     Quit date: 2017     Years since quittin.6    Smokeless tobacco: Never   Substance Use Topics    Alcohol use: No     Family History  Family History   Problem Relation Age of Onset    Heart Disease Father     Hypertension Father     Cancer Father         possible prostate    Diabetes Mother     Hypertension Mother     Stroke Mother        Review of Systems   Constitutional:  Negative for fatigue. Respiratory:  Negative for shortness of breath. Cardiovascular:  Negative for chest pain. Musculoskeletal:  Negative for gait problem. Neurological:  Negative for speech difficulty and weakness. Vital Signs  BP (!) 142/91 (Site: Left Upper Arm, Position: Sitting, Cuff Size: Large Adult)   Pulse 86   Temp 97.7 °F (36.5 °C) (Temporal)   Ht 6' 1\" (1.854 m)   Wt 233 lb (105.7 kg)   SpO2 98%   BMI 30.74 kg/m²   Body mass index is 30.74 kg/m². Physical Exam  Vitals reviewed. Constitutional:       General: He is not in acute distress. Appearance: Normal appearance. He is not ill-appearing. HENT:      Head: Normocephalic and atraumatic. Eyes:      General: No scleral icterus. Conjunctiva/sclera: Conjunctivae normal.   Pulmonary:      Effort: Pulmonary effort is normal.   Musculoskeletal:         General: No swelling. Skin:     Coloration: Skin is not jaundiced. Findings: No rash. Neurological:      General: No focal deficit present. Mental Status: He is alert. Mental status is at baseline. Cranial Nerves: No cranial nerve deficit. Motor: No weakness. Gait: Gait normal.   Psychiatric:         Mood and Affect: Mood normal.         Behavior: Behavior normal.         Thought Content: Thought content normal.         Judgment: Judgment normal.         Assessment/Plan:  Lorenzo Solano was seen today for forms. Diagnoses and all orders for this visit:    Type 2 diabetes mellitus without complication, without long-term current use of insulin ([facility])    Atherosclerosis of native coronary artery of native heart without angina pectoris  Continue current medical therapy. No lifting over 30 pounds for more than cumulative 2 hours daily. Return if symptoms worsen or fail to improve.   __  Selvin Azevedo M.D.

## 2023-04-02 NOTE — PROGRESS NOTES
[facility] CARDIOLOGY Follow Up                 Reason for Visit: Stable ischemic heart disease    Subjective:     Patient is a 50 y.o. male with a PMH of CAD status post PCI, hypertension, and hyperlipidemia who presents in follow-up. The patient last seen in 2022. Baby aspirin daily and Effient were discontinued with no compelling indication for DAPT. Plavix was initiated. Acebutolol was discontinued since he was already on a beta-blocker. It was recommended that he submit measurement of his BP. A TTE was ordered. The patient had a TTE in 2022 that was noted to demonstrate a normal EF and mild MR. Of note, upon personal view, his RA and LA size were noted to be normal size, and his aorta was insufficiently visualized. The patient denies angina and shortness of breath.     Past Medical History:   Diagnosis Date    Arthritis     Benign essential hypertension     CAD (coronary artery disease)     MI in , 2017    Chronic pain     Dilated cardiomyopathy (HCC)     Gastrointestinal hemorrhage with hematemesis 2017    GERD (gastroesophageal reflux disease)     Heart failure (HCC)     Hyperlipidemia LDL goal <70     Psychiatric disorder     depression and anxiety    Renal insufficiency       Past Surgical History:   Procedure Laterality Date    CORONARY ANGIOPLASTY WITH STENT PLACEMENT  2017    RCA    LEFT HEART CATH,PERCUTANEOUS  2017    STEMI & PCI    ORTHOPEDIC SURGERY      right femur ORIF surg    WISDOM TOOTH EXTRACTION        Family History   Problem Relation Age of Onset    Heart Disease Father     Hypertension Father     Cancer Father         possible prostate    Diabetes Mother     Hypertension Mother     Stroke Mother       Social History     Tobacco Use    Smoking status: Former     Packs/day: 0.25     Years: 15.00     Pack years: 3.75     Types: Cigarettes     Quit date: 2017     Years since quittin.7    Smokeless tobacco: Never   Substance Use Topics

## 2023-04-04 ENCOUNTER — OFFICE VISIT (OUTPATIENT)
Dept: CARDIOLOGY CLINIC | Age: 53
End: 2023-04-04
Payer: MEDICARE

## 2023-04-04 VITALS
DIASTOLIC BLOOD PRESSURE: 82 MMHG | HEART RATE: 68 BPM | BODY MASS INDEX: 29.09 KG/M2 | HEIGHT: 75 IN | SYSTOLIC BLOOD PRESSURE: 128 MMHG | WEIGHT: 234 LBS

## 2023-04-04 DIAGNOSIS — I25.10 CAD IN NATIVE ARTERY: Primary | ICD-10-CM

## 2023-04-04 DIAGNOSIS — E78.5 HYPERLIPIDEMIA, UNSPECIFIED HYPERLIPIDEMIA TYPE: ICD-10-CM

## 2023-04-04 DIAGNOSIS — I10 HYPERTENSION, UNSPECIFIED TYPE: ICD-10-CM

## 2023-04-04 DIAGNOSIS — I34.0 MILD MITRAL REGURGITATION BY PRIOR ECHOCARDIOGRAM: ICD-10-CM

## 2023-04-04 PROCEDURE — 99214 OFFICE O/P EST MOD 30 MIN: CPT | Performed by: INTERNAL MEDICINE

## 2023-04-04 PROCEDURE — 3074F SYST BP LT 130 MM HG: CPT | Performed by: INTERNAL MEDICINE

## 2023-04-04 PROCEDURE — 3079F DIAST BP 80-89 MM HG: CPT | Performed by: INTERNAL MEDICINE

## 2023-05-05 ENCOUNTER — OFFICE VISIT (OUTPATIENT)
Dept: INTERNAL MEDICINE CLINIC | Facility: CLINIC | Age: 53
End: 2023-05-05
Payer: MEDICARE

## 2023-05-05 VITALS
HEART RATE: 83 BPM | SYSTOLIC BLOOD PRESSURE: 123 MMHG | WEIGHT: 233 LBS | HEIGHT: 75 IN | BODY MASS INDEX: 28.97 KG/M2 | TEMPERATURE: 97.6 F | OXYGEN SATURATION: 98 % | DIASTOLIC BLOOD PRESSURE: 89 MMHG

## 2023-05-05 DIAGNOSIS — K21.9 GASTROESOPHAGEAL REFLUX DISEASE, UNSPECIFIED WHETHER ESOPHAGITIS PRESENT: ICD-10-CM

## 2023-05-05 DIAGNOSIS — E78.49 OTHER HYPERLIPIDEMIA: ICD-10-CM

## 2023-05-05 DIAGNOSIS — I10 ESSENTIAL HYPERTENSION: ICD-10-CM

## 2023-05-05 DIAGNOSIS — E11.9 TYPE 2 DIABETES MELLITUS WITHOUT COMPLICATION, WITHOUT LONG-TERM CURRENT USE OF INSULIN (HCC): Primary | ICD-10-CM

## 2023-05-05 PROCEDURE — 3044F HG A1C LEVEL LT 7.0%: CPT | Performed by: INTERNAL MEDICINE

## 2023-05-05 PROCEDURE — 99214 OFFICE O/P EST MOD 30 MIN: CPT | Performed by: INTERNAL MEDICINE

## 2023-05-05 PROCEDURE — 3079F DIAST BP 80-89 MM HG: CPT | Performed by: INTERNAL MEDICINE

## 2023-05-05 PROCEDURE — 3074F SYST BP LT 130 MM HG: CPT | Performed by: INTERNAL MEDICINE

## 2023-05-05 RX ORDER — CARVEDILOL 25 MG/1
TABLET ORAL
Qty: 180 TABLET | Refills: 1 | Status: SHIPPED | OUTPATIENT
Start: 2023-05-05

## 2023-05-05 RX ORDER — SITAGLIPTIN AND METFORMIN HYDROCHLORIDE 1000; 100 MG/1; MG/1
1 TABLET, FILM COATED, EXTENDED RELEASE ORAL DAILY
Qty: 90 TABLET | Refills: 1 | Status: SHIPPED | OUTPATIENT
Start: 2023-05-05

## 2023-05-05 RX ORDER — PANTOPRAZOLE SODIUM 40 MG/1
40 TABLET, DELAYED RELEASE ORAL
Qty: 180 TABLET | Refills: 1 | Status: SHIPPED | OUTPATIENT
Start: 2023-05-05

## 2023-05-05 RX ORDER — FUROSEMIDE 80 MG
TABLET ORAL
Qty: 90 TABLET | Refills: 1 | Status: SHIPPED | OUTPATIENT
Start: 2023-05-05

## 2023-05-05 RX ORDER — ATORVASTATIN CALCIUM 80 MG/1
TABLET, FILM COATED ORAL
Qty: 90 TABLET | Refills: 1 | Status: SHIPPED | OUTPATIENT
Start: 2023-05-05

## 2023-05-05 SDOH — ECONOMIC STABILITY: FOOD INSECURITY: WITHIN THE PAST 12 MONTHS, YOU WORRIED THAT YOUR FOOD WOULD RUN OUT BEFORE YOU GOT MONEY TO BUY MORE.: NEVER TRUE

## 2023-05-05 SDOH — ECONOMIC STABILITY: INCOME INSECURITY: HOW HARD IS IT FOR YOU TO PAY FOR THE VERY BASICS LIKE FOOD, HOUSING, MEDICAL CARE, AND HEATING?: NOT HARD AT ALL

## 2023-05-05 SDOH — ECONOMIC STABILITY: FOOD INSECURITY: WITHIN THE PAST 12 MONTHS, THE FOOD YOU BOUGHT JUST DIDN'T LAST AND YOU DIDN'T HAVE MONEY TO GET MORE.: NEVER TRUE

## 2023-05-05 ASSESSMENT — PATIENT HEALTH QUESTIONNAIRE - PHQ9
2. FEELING DOWN, DEPRESSED OR HOPELESS: 0
SUM OF ALL RESPONSES TO PHQ QUESTIONS 1-9: 0
5. POOR APPETITE OR OVEREATING: 0
7. TROUBLE CONCENTRATING ON THINGS, SUCH AS READING THE NEWSPAPER OR WATCHING TELEVISION: 0
1. LITTLE INTEREST OR PLEASURE IN DOING THINGS: 0
10. IF YOU CHECKED OFF ANY PROBLEMS, HOW DIFFICULT HAVE THESE PROBLEMS MADE IT FOR YOU TO DO YOUR WORK, TAKE CARE OF THINGS AT HOME, OR GET ALONG WITH OTHER PEOPLE: 0
SUM OF ALL RESPONSES TO PHQ QUESTIONS 1-9: 0
8. MOVING OR SPEAKING SO SLOWLY THAT OTHER PEOPLE COULD HAVE NOTICED. OR THE OPPOSITE, BEING SO FIGETY OR RESTLESS THAT YOU HAVE BEEN MOVING AROUND A LOT MORE THAN USUAL: 0
6. FEELING BAD ABOUT YOURSELF - OR THAT YOU ARE A FAILURE OR HAVE LET YOURSELF OR YOUR FAMILY DOWN: 0
9. THOUGHTS THAT YOU WOULD BE BETTER OFF DEAD, OR OF HURTING YOURSELF: 0
3. TROUBLE FALLING OR STAYING ASLEEP: 0
SUM OF ALL RESPONSES TO PHQ QUESTIONS 1-9: 0
4. FEELING TIRED OR HAVING LITTLE ENERGY: 0
SUM OF ALL RESPONSES TO PHQ QUESTIONS 1-9: 0
SUM OF ALL RESPONSES TO PHQ9 QUESTIONS 1 & 2: 0

## 2023-05-05 ASSESSMENT — ANXIETY QUESTIONNAIRES
5. BEING SO RESTLESS THAT IT IS HARD TO SIT STILL: 0
3. WORRYING TOO MUCH ABOUT DIFFERENT THINGS: 0
7. FEELING AFRAID AS IF SOMETHING AWFUL MIGHT HAPPEN: 0
2. NOT BEING ABLE TO STOP OR CONTROL WORRYING: 0
1. FEELING NERVOUS, ANXIOUS, OR ON EDGE: 0
GAD7 TOTAL SCORE: 0
IF YOU CHECKED OFF ANY PROBLEMS ON THIS QUESTIONNAIRE, HOW DIFFICULT HAVE THESE PROBLEMS MADE IT FOR YOU TO DO YOUR WORK, TAKE CARE OF THINGS AT HOME, OR GET ALONG WITH OTHER PEOPLE: NOT DIFFICULT AT ALL
4. TROUBLE RELAXING: 0
6. BECOMING EASILY ANNOYED OR IRRITABLE: 0

## 2023-05-05 ASSESSMENT — ENCOUNTER SYMPTOMS: SHORTNESS OF BREATH: 0

## 2023-05-05 NOTE — PROGRESS NOTES
Yenni Chavez M.D. Internal Medicine  [address] [facility] , [address]   Office : [phone]  Fax : [phone]    Chief Complaint   Patient presents with    Diabetes     4 mo follow up       History of Present Illness:  Lokesh Rodas is a 50 y.o. male. HPI    Diabetes Mellitus  Patient presents  for follow up on diabetes. Onset of symptoms was several years ago. Patient describes symptoms as none. Course to date has been well controlled. Diet and Lifestyle: follows a diabetic diet regularly  exercises sporadically  nonsmoker  Home glucose monitoring:  Results average n/a. Patient denies polyuria and polydipsia. No wounds in lower limbs. Most recent HbA1c was   Hemoglobin A1C   Date Value Ref Range Status   01/06/2023 6.3 (H) 4.8 - 5.6 % Final       Hypertension  Patient is in for Hypertension which is stable. Diet and Lifestyle: generally follows a low sodium diet  Home BP Monitoring: is not measured at home. Patient's recent blood pressures were   BP Readings from Last 3 Encounters:   05/05/23 123/89   04/04/23 128/82   02/20/23 131/89   . taking medications as instructed, no medication side effects noted, no TIA's, no chest pain on exertion, no dyspnea on exertion, no swelling of ankles. Cardiac risk factors consist of diabetes mellitus, dyslipidemia, hypertension, and male gender. Lab Results   Component Value Date/Time     01/06/2023 09:10 AM    K 3.5 01/06/2023 09:10 AM     01/06/2023 09:10 AM    CO2 28 01/06/2023 09:10 AM    BUN 10 01/06/2023 09:10 AM    CREATININE 1.30 01/06/2023 09:10 AM    GLUCOSE 153 01/06/2023 09:10 AM    CALCIUM 9.1 01/06/2023 09:10 AM        Hyperlipidemia  Patient is in for follow-up for hyperlipidemia. Diet and Lifestyle: nonsmoker. Risk factors for vascular disease consist of hyperlipidemia, hypertension, and diabetes.   LAST LDL was   Lab Results   Component Value Date    LDLCALC 80.2

## 2023-09-01 ENCOUNTER — OFFICE VISIT (OUTPATIENT)
Dept: INTERNAL MEDICINE CLINIC | Facility: CLINIC | Age: 53
End: 2023-09-01
Payer: MEDICARE

## 2023-09-01 VITALS
SYSTOLIC BLOOD PRESSURE: 121 MMHG | OXYGEN SATURATION: 98 % | HEART RATE: 88 BPM | TEMPERATURE: 98.6 F | BODY MASS INDEX: 27.96 KG/M2 | WEIGHT: 224.9 LBS | DIASTOLIC BLOOD PRESSURE: 87 MMHG | HEIGHT: 75 IN

## 2023-09-01 DIAGNOSIS — E11.9 TYPE 2 DIABETES MELLITUS WITHOUT COMPLICATION, WITHOUT LONG-TERM CURRENT USE OF INSULIN (HCC): Primary | ICD-10-CM

## 2023-09-01 DIAGNOSIS — K21.9 GASTROESOPHAGEAL REFLUX DISEASE, UNSPECIFIED WHETHER ESOPHAGITIS PRESENT: ICD-10-CM

## 2023-09-01 DIAGNOSIS — I10 ESSENTIAL HYPERTENSION: ICD-10-CM

## 2023-09-01 DIAGNOSIS — E11.9 TYPE 2 DIABETES MELLITUS WITHOUT COMPLICATION, WITHOUT LONG-TERM CURRENT USE OF INSULIN (HCC): ICD-10-CM

## 2023-09-01 DIAGNOSIS — E78.49 OTHER HYPERLIPIDEMIA: ICD-10-CM

## 2023-09-01 LAB
ANION GAP SERPL CALC-SCNC: 5 MMOL/L (ref 2–11)
BUN SERPL-MCNC: 22 MG/DL (ref 6–23)
CALCIUM SERPL-MCNC: 9 MG/DL (ref 8.3–10.4)
CHLORIDE SERPL-SCNC: 109 MMOL/L (ref 101–110)
CO2 SERPL-SCNC: 25 MMOL/L (ref 21–32)
CREAT SERPL-MCNC: 1.4 MG/DL (ref 0.8–1.5)
CREAT UR-MCNC: 105 MG/DL
EST. AVERAGE GLUCOSE BLD GHB EST-MCNC: 143 MG/DL
GLUCOSE SERPL-MCNC: 145 MG/DL (ref 65–100)
HBA1C MFR BLD: 6.6 % (ref 4.8–5.6)
MICROALBUMIN UR-MCNC: <0.5 MG/DL
MICROALBUMIN/CREAT UR-RTO: NORMAL MG/G (ref 0–30)
POTASSIUM SERPL-SCNC: 3.8 MMOL/L (ref 3.5–5.1)
SODIUM SERPL-SCNC: 139 MMOL/L (ref 133–143)

## 2023-09-01 PROCEDURE — 99214 OFFICE O/P EST MOD 30 MIN: CPT | Performed by: INTERNAL MEDICINE

## 2023-09-01 PROCEDURE — 3044F HG A1C LEVEL LT 7.0%: CPT | Performed by: INTERNAL MEDICINE

## 2023-09-01 PROCEDURE — 3074F SYST BP LT 130 MM HG: CPT | Performed by: INTERNAL MEDICINE

## 2023-09-01 PROCEDURE — 3079F DIAST BP 80-89 MM HG: CPT | Performed by: INTERNAL MEDICINE

## 2023-09-01 RX ORDER — ATORVASTATIN CALCIUM 80 MG/1
TABLET, FILM COATED ORAL
Qty: 90 TABLET | Refills: 1 | Status: SHIPPED | OUTPATIENT
Start: 2023-09-01

## 2023-09-01 RX ORDER — PANTOPRAZOLE SODIUM 40 MG/1
40 TABLET, DELAYED RELEASE ORAL
Qty: 180 TABLET | Refills: 1 | Status: CANCELLED | OUTPATIENT
Start: 2023-09-01

## 2023-09-01 RX ORDER — SITAGLIPTIN AND METFORMIN HYDROCHLORIDE 1000; 100 MG/1; MG/1
1 TABLET, FILM COATED, EXTENDED RELEASE ORAL DAILY
Qty: 90 TABLET | Refills: 1 | Status: SHIPPED | OUTPATIENT
Start: 2023-09-01

## 2023-09-01 RX ORDER — CARVEDILOL 25 MG/1
TABLET ORAL
Qty: 180 TABLET | Refills: 1 | Status: SHIPPED | OUTPATIENT
Start: 2023-09-01

## 2023-09-01 RX ORDER — FUROSEMIDE 80 MG
TABLET ORAL
Qty: 90 TABLET | Refills: 1 | Status: SHIPPED | OUTPATIENT
Start: 2023-09-01

## 2023-09-01 ASSESSMENT — ENCOUNTER SYMPTOMS: SHORTNESS OF BREATH: 0

## 2023-09-01 NOTE — PROGRESS NOTES
Component Value Date    LDLCALC 80.2 01/06/2023   . Last ALT was   Lab Results   Component Value Date/Time    ALT 28 01/06/2023 09:10 AM   .  No muscle aches. GERD  He complains of gastroesophageal reflux with cough and heartburn. Symptoms have been present for several years. Patient denies dysphagia. Medical therapy in the past has included H2 antagonists and proton pump inhibitors. Symptoms are  improved. Mainly taking Zantac 360     ASHD   Denies any angina. Followed by cardiology    Past Medical History:  Past Medical History:   Diagnosis Date    Arthritis     Benign essential hypertension     CAD (coronary artery disease)     MI in 12/07, 6/2017    Chronic pain     Dilated cardiomyopathy (HCC)     Gastrointestinal hemorrhage with hematemesis 6/20/2017    GERD (gastroesophageal reflux disease)     Heart failure (HCC)     Hyperlipidemia LDL goal <70     Psychiatric disorder     depression and anxiety    Renal insufficiency      Past Surgical History:  Past Surgical History:   Procedure Laterality Date    CORONARY ANGIOPLASTY WITH STENT PLACEMENT  06/2017    RCA    LEFT HEART CATH,PERCUTANEOUS  06/19/2017    STEMI & PCI    ORTHOPEDIC SURGERY      right femur ORIF surg    WISDOM TOOTH EXTRACTION       Allergies:    Allergies   Allergen Reactions    Abciximab Other (See Comments)     Thrombocytopenia    Lisinopril Swelling     Medications:   Current Outpatient Medications   Medication Sig Dispense Refill    atorvastatin (LIPITOR) 80 MG tablet TAKE 1 TABLET BY MOUTH DAILY 90 tablet 1    carvedilol (COREG) 25 MG tablet TAKE 1 TABLET BY MOUTH TWICE DAILY 180 tablet 1    furosemide (LASIX) 80 MG tablet TAKE 1 TABLET BY MOUTH DAILY 90 tablet 1    SITagliptin-metFORMIN HCl ER (JANUMET XR) 100-1000 MG TB24 Take 1 tablet by mouth daily 90 tablet 1    empagliflozin (JARDIANCE) 25 MG tablet Take 1 tablet by mouth daily 90 tablet 1    losartan (COZAAR) 100 MG tablet TAKE 1 TABLET BY MOUTH EVERY DAY 90 tablet 3

## 2023-10-02 NOTE — PROGRESS NOTES
[facility] CARDIOLOGY Follow Up                 Reason for Visit: Stable ischemic heart disease    Subjective:     Patient is a 48 y.o. male with a PMH of CAD status post PCI, hypertension, and hyperlipidemia who presents for follow-up. The patient was last seen in 2023. He had a TTE in 2022 that was noted to demonstrate a normal EF and mild MR. Of note, upon personal view, his RA and LA size were noted to be normal size, and his aorta was insufficiently visualized. The patient denies angina and dyspnea. Past Medical History:   Diagnosis Date    Arthritis     Benign essential hypertension     CAD (coronary artery disease)     MI in , 2017    Chronic pain     Dilated cardiomyopathy (HCC)     Gastrointestinal hemorrhage with hematemesis 2017    GERD (gastroesophageal reflux disease)     Heart failure (HCC)     Hyperlipidemia LDL goal <70     Psychiatric disorder     depression and anxiety    Renal insufficiency       Past Surgical History:   Procedure Laterality Date    CORONARY ANGIOPLASTY WITH STENT PLACEMENT  2017    RCA    LEFT HEART CATH,PERCUTANEOUS  2017    STEMI & PCI    ORTHOPEDIC SURGERY      right femur ORIF surg    WISDOM TOOTH EXTRACTION        Family History   Problem Relation Age of Onset    Heart Disease Father     Hypertension Father     Cancer Father         possible prostate    Diabetes Mother     Hypertension Mother     Stroke Mother       Social History     Tobacco Use    Smoking status: Former     Packs/day: 0.25     Years: 15.00     Additional pack years: 0.00     Total pack years: 3.75     Types: Cigarettes     Quit date: 2017     Years since quittin.2    Smokeless tobacco: Never   Substance Use Topics    Alcohol use: No      Allergies   Allergen Reactions    Abciximab Other (See Comments)     Thrombocytopenia    Lisinopril Swelling         ROS:  No obvious pertinent positives on review of systems except for what was outlined above.

## 2023-10-04 ENCOUNTER — OFFICE VISIT (OUTPATIENT)
Age: 53
End: 2023-10-04
Payer: MEDICARE

## 2023-10-04 VITALS
HEART RATE: 84 BPM | SYSTOLIC BLOOD PRESSURE: 116 MMHG | DIASTOLIC BLOOD PRESSURE: 80 MMHG | BODY MASS INDEX: 27.88 KG/M2 | WEIGHT: 224.2 LBS | HEIGHT: 75 IN

## 2023-10-04 DIAGNOSIS — I34.0 MILD MITRAL REGURGITATION BY PRIOR ECHOCARDIOGRAM: ICD-10-CM

## 2023-10-04 DIAGNOSIS — E78.5 HYPERLIPIDEMIA, UNSPECIFIED HYPERLIPIDEMIA TYPE: ICD-10-CM

## 2023-10-04 DIAGNOSIS — I25.10 CAD IN NATIVE ARTERY: Primary | ICD-10-CM

## 2023-10-04 DIAGNOSIS — I10 HYPERTENSION, UNSPECIFIED TYPE: ICD-10-CM

## 2023-10-04 PROCEDURE — 3079F DIAST BP 80-89 MM HG: CPT | Performed by: INTERNAL MEDICINE

## 2023-10-04 PROCEDURE — 3074F SYST BP LT 130 MM HG: CPT | Performed by: INTERNAL MEDICINE

## 2023-10-04 PROCEDURE — 99214 OFFICE O/P EST MOD 30 MIN: CPT | Performed by: INTERNAL MEDICINE

## 2023-10-18 ENCOUNTER — NURSE TRIAGE (OUTPATIENT)
Dept: OTHER | Facility: CLINIC | Age: 53
End: 2023-10-18

## 2023-10-19 ENCOUNTER — OFFICE VISIT (OUTPATIENT)
Dept: INTERNAL MEDICINE CLINIC | Facility: CLINIC | Age: 53
End: 2023-10-19
Payer: MEDICARE

## 2023-10-19 VITALS
TEMPERATURE: 97.3 F | OXYGEN SATURATION: 98 % | BODY MASS INDEX: 28.35 KG/M2 | DIASTOLIC BLOOD PRESSURE: 88 MMHG | HEART RATE: 78 BPM | HEIGHT: 75 IN | WEIGHT: 228 LBS | SYSTOLIC BLOOD PRESSURE: 133 MMHG

## 2023-10-19 DIAGNOSIS — H10.33 ACUTE BACTERIAL CONJUNCTIVITIS OF BOTH EYES: Primary | ICD-10-CM

## 2023-10-19 PROCEDURE — 3075F SYST BP GE 130 - 139MM HG: CPT | Performed by: INTERNAL MEDICINE

## 2023-10-19 PROCEDURE — 99213 OFFICE O/P EST LOW 20 MIN: CPT | Performed by: INTERNAL MEDICINE

## 2023-10-19 PROCEDURE — 3079F DIAST BP 80-89 MM HG: CPT | Performed by: INTERNAL MEDICINE

## 2023-10-19 RX ORDER — SULFACETAMIDE SODIUM 100 MG/ML
2 SOLUTION/ DROPS OPHTHALMIC
Qty: 10 ML | Refills: 0 | Status: SHIPPED | OUTPATIENT
Start: 2023-10-19 | End: 2023-10-29

## 2023-10-19 ASSESSMENT — ENCOUNTER SYMPTOMS
PHOTOPHOBIA: 0
EYE ITCHING: 1
DOUBLE VISION: 0
EYE PAIN: 1
EYE REDNESS: 1
EYE DISCHARGE: 1

## 2023-11-05 DIAGNOSIS — E11.9 TYPE 2 DIABETES MELLITUS WITHOUT COMPLICATION, WITHOUT LONG-TERM CURRENT USE OF INSULIN (HCC): ICD-10-CM

## 2023-11-05 DIAGNOSIS — I10 ESSENTIAL HYPERTENSION: ICD-10-CM

## 2023-11-06 RX ORDER — SITAGLIPTIN AND METFORMIN HYDROCHLORIDE 1000; 100 MG/1; MG/1
1 TABLET, FILM COATED, EXTENDED RELEASE ORAL DAILY
Qty: 90 TABLET | Refills: 1 | OUTPATIENT
Start: 2023-11-06

## 2023-11-06 RX ORDER — FUROSEMIDE 80 MG
TABLET ORAL
Qty: 90 TABLET | Refills: 1 | OUTPATIENT
Start: 2023-11-06

## 2023-11-06 RX ORDER — CLOPIDOGREL BISULFATE 75 MG/1
75 TABLET ORAL DAILY
Qty: 90 TABLET | Refills: 3 | Status: SHIPPED | OUTPATIENT
Start: 2023-11-06

## 2023-11-06 RX ORDER — LOSARTAN POTASSIUM 100 MG/1
TABLET ORAL
Qty: 90 TABLET | Refills: 3 | Status: SHIPPED | OUTPATIENT
Start: 2023-11-06

## 2023-11-06 NOTE — TELEPHONE ENCOUNTER
MEDICATION REFILL REQUEST      Name of Medication:  Losartan  Dose:  100 mg  Frequency:  QD  Quantity:  90  Days' supply:  90      Pharmacy Name/Location:  [address] [place] [address]      Name of Medication:  Clopidogrel  Dose:  75 mg  Frequency:  QD  Quantity:  90  Days' supply:  90      Pharmacy Name/Location: [facility]

## 2024-01-08 ENCOUNTER — OFFICE VISIT (OUTPATIENT)
Dept: INTERNAL MEDICINE CLINIC | Facility: CLINIC | Age: 54
End: 2024-01-08
Payer: MEDICARE

## 2024-01-08 VITALS
TEMPERATURE: 97.5 F | OXYGEN SATURATION: 96 % | BODY MASS INDEX: 28.6 KG/M2 | HEIGHT: 75 IN | HEART RATE: 93 BPM | SYSTOLIC BLOOD PRESSURE: 125 MMHG | DIASTOLIC BLOOD PRESSURE: 80 MMHG | WEIGHT: 230 LBS

## 2024-01-08 DIAGNOSIS — Z12.5 SCREENING FOR PROSTATE CANCER: ICD-10-CM

## 2024-01-08 DIAGNOSIS — E11.9 TYPE 2 DIABETES MELLITUS WITHOUT COMPLICATION, WITHOUT LONG-TERM CURRENT USE OF INSULIN (HCC): ICD-10-CM

## 2024-01-08 DIAGNOSIS — K21.9 GASTROESOPHAGEAL REFLUX DISEASE, UNSPECIFIED WHETHER ESOPHAGITIS PRESENT: ICD-10-CM

## 2024-01-08 DIAGNOSIS — I25.10 ASHD (ARTERIOSCLEROTIC HEART DISEASE): ICD-10-CM

## 2024-01-08 DIAGNOSIS — E11.9 TYPE 2 DIABETES MELLITUS WITHOUT COMPLICATION, WITHOUT LONG-TERM CURRENT USE OF INSULIN (HCC): Primary | ICD-10-CM

## 2024-01-08 DIAGNOSIS — I10 ESSENTIAL HYPERTENSION: ICD-10-CM

## 2024-01-08 DIAGNOSIS — E78.49 OTHER HYPERLIPIDEMIA: ICD-10-CM

## 2024-01-08 LAB
ALBUMIN SERPL-MCNC: 3.9 G/DL (ref 3.5–5)
ALBUMIN/GLOB SERPL: 1.1 (ref 0.4–1.6)
ALP SERPL-CCNC: 104 U/L (ref 50–136)
ALT SERPL-CCNC: 18 U/L (ref 12–65)
ANION GAP SERPL CALC-SCNC: 7 MMOL/L (ref 2–11)
AST SERPL-CCNC: 12 U/L (ref 15–37)
BASOPHILS # BLD: 0.1 K/UL (ref 0–0.2)
BASOPHILS NFR BLD: 1 % (ref 0–2)
BILIRUB DIRECT SERPL-MCNC: 0.2 MG/DL
BILIRUB SERPL-MCNC: 0.4 MG/DL (ref 0.2–1.1)
BUN SERPL-MCNC: 18 MG/DL (ref 6–23)
CALCIUM SERPL-MCNC: 9.3 MG/DL (ref 8.3–10.4)
CHLORIDE SERPL-SCNC: 109 MMOL/L (ref 103–113)
CHOLEST SERPL-MCNC: 141 MG/DL
CO2 SERPL-SCNC: 25 MMOL/L (ref 21–32)
CREAT SERPL-MCNC: 1.3 MG/DL (ref 0.8–1.5)
DIFFERENTIAL METHOD BLD: ABNORMAL
EOSINOPHIL # BLD: 0.3 K/UL (ref 0–0.8)
EOSINOPHIL NFR BLD: 4 % (ref 0.5–7.8)
ERYTHROCYTE [DISTWIDTH] IN BLOOD BY AUTOMATED COUNT: 13.3 % (ref 11.9–14.6)
GLOBULIN SER CALC-MCNC: 3.6 G/DL (ref 2.8–4.5)
GLUCOSE SERPL-MCNC: 142 MG/DL (ref 65–100)
HCT VFR BLD AUTO: 51 % (ref 41.1–50.3)
HDLC SERPL-MCNC: 27 MG/DL (ref 40–60)
HDLC SERPL: 5.2
HGB BLD-MCNC: 16.7 G/DL (ref 13.6–17.2)
IMM GRANULOCYTES # BLD AUTO: 0 K/UL (ref 0–0.5)
IMM GRANULOCYTES NFR BLD AUTO: 0 % (ref 0–5)
LDLC SERPL CALC-MCNC: 66.6 MG/DL
LYMPHOCYTES # BLD: 3.1 K/UL (ref 0.5–4.6)
LYMPHOCYTES NFR BLD: 36 % (ref 13–44)
MCH RBC QN AUTO: 30 PG (ref 26.1–32.9)
MCHC RBC AUTO-ENTMCNC: 32.7 G/DL (ref 31.4–35)
MCV RBC AUTO: 91.7 FL (ref 82–102)
MONOCYTES # BLD: 0.6 K/UL (ref 0.1–1.3)
MONOCYTES NFR BLD: 7 % (ref 4–12)
NEUTS SEG # BLD: 4.5 K/UL (ref 1.7–8.2)
NEUTS SEG NFR BLD: 52 % (ref 43–78)
NRBC # BLD: 0 K/UL (ref 0–0.2)
PLATELET # BLD AUTO: 221 K/UL (ref 150–450)
PMV BLD AUTO: 9.1 FL (ref 9.4–12.3)
POTASSIUM SERPL-SCNC: 3.3 MMOL/L (ref 3.5–5.1)
PROT SERPL-MCNC: 7.5 G/DL (ref 6.3–8.2)
PSA SERPL-MCNC: 0.5 NG/ML
RBC # BLD AUTO: 5.56 M/UL (ref 4.23–5.6)
SODIUM SERPL-SCNC: 141 MMOL/L (ref 136–146)
TRIGL SERPL-MCNC: 237 MG/DL (ref 35–150)
VLDLC SERPL CALC-MCNC: 47.4 MG/DL (ref 6–23)
WBC # BLD AUTO: 8.6 K/UL (ref 4.3–11.1)

## 2024-01-08 PROCEDURE — 3074F SYST BP LT 130 MM HG: CPT | Performed by: INTERNAL MEDICINE

## 2024-01-08 PROCEDURE — 99214 OFFICE O/P EST MOD 30 MIN: CPT | Performed by: INTERNAL MEDICINE

## 2024-01-08 PROCEDURE — 3079F DIAST BP 80-89 MM HG: CPT | Performed by: INTERNAL MEDICINE

## 2024-01-08 RX ORDER — ATORVASTATIN CALCIUM 80 MG/1
TABLET, FILM COATED ORAL
Qty: 90 TABLET | Refills: 1 | Status: SHIPPED | OUTPATIENT
Start: 2024-01-08

## 2024-01-08 RX ORDER — SITAGLIPTIN AND METFORMIN HYDROCHLORIDE 1000; 100 MG/1; MG/1
1 TABLET, FILM COATED, EXTENDED RELEASE ORAL DAILY
Qty: 90 TABLET | Refills: 1 | Status: SHIPPED | OUTPATIENT
Start: 2024-01-08

## 2024-01-08 RX ORDER — FUROSEMIDE 80 MG
TABLET ORAL
Qty: 90 TABLET | Refills: 1 | Status: ON HOLD | OUTPATIENT
Start: 2024-01-08 | End: 2024-01-14 | Stop reason: HOSPADM

## 2024-01-08 RX ORDER — CARVEDILOL 25 MG/1
TABLET ORAL
Qty: 180 TABLET | Refills: 1 | Status: SHIPPED | OUTPATIENT
Start: 2024-01-08

## 2024-01-08 SDOH — ECONOMIC STABILITY: FOOD INSECURITY: WITHIN THE PAST 12 MONTHS, YOU WORRIED THAT YOUR FOOD WOULD RUN OUT BEFORE YOU GOT MONEY TO BUY MORE.: NEVER TRUE

## 2024-01-08 SDOH — ECONOMIC STABILITY: FOOD INSECURITY: WITHIN THE PAST 12 MONTHS, THE FOOD YOU BOUGHT JUST DIDN'T LAST AND YOU DIDN'T HAVE MONEY TO GET MORE.: NEVER TRUE

## 2024-01-08 SDOH — ECONOMIC STABILITY: INCOME INSECURITY: HOW HARD IS IT FOR YOU TO PAY FOR THE VERY BASICS LIKE FOOD, HOUSING, MEDICAL CARE, AND HEATING?: NOT HARD AT ALL

## 2024-01-08 ASSESSMENT — PATIENT HEALTH QUESTIONNAIRE - PHQ9
7. TROUBLE CONCENTRATING ON THINGS, SUCH AS READING THE NEWSPAPER OR WATCHING TELEVISION: 0
3. TROUBLE FALLING OR STAYING ASLEEP: 0
SUM OF ALL RESPONSES TO PHQ QUESTIONS 1-9: 0
SUM OF ALL RESPONSES TO PHQ QUESTIONS 1-9: 0
8. MOVING OR SPEAKING SO SLOWLY THAT OTHER PEOPLE COULD HAVE NOTICED. OR THE OPPOSITE, BEING SO FIGETY OR RESTLESS THAT YOU HAVE BEEN MOVING AROUND A LOT MORE THAN USUAL: 0
SUM OF ALL RESPONSES TO PHQ QUESTIONS 1-9: 0
SUM OF ALL RESPONSES TO PHQ9 QUESTIONS 1 & 2: 0
1. LITTLE INTEREST OR PLEASURE IN DOING THINGS: 0
2. FEELING DOWN, DEPRESSED OR HOPELESS: 0
SUM OF ALL RESPONSES TO PHQ QUESTIONS 1-9: 0
10. IF YOU CHECKED OFF ANY PROBLEMS, HOW DIFFICULT HAVE THESE PROBLEMS MADE IT FOR YOU TO DO YOUR WORK, TAKE CARE OF THINGS AT HOME, OR GET ALONG WITH OTHER PEOPLE: 0
4. FEELING TIRED OR HAVING LITTLE ENERGY: 0
9. THOUGHTS THAT YOU WOULD BE BETTER OFF DEAD, OR OF HURTING YOURSELF: 0
5. POOR APPETITE OR OVEREATING: 0
6. FEELING BAD ABOUT YOURSELF - OR THAT YOU ARE A FAILURE OR HAVE LET YOURSELF OR YOUR FAMILY DOWN: 0

## 2024-01-08 ASSESSMENT — ANXIETY QUESTIONNAIRES
GAD7 TOTAL SCORE: 0
1. FEELING NERVOUS, ANXIOUS, OR ON EDGE: 0
4. TROUBLE RELAXING: 0
3. WORRYING TOO MUCH ABOUT DIFFERENT THINGS: 0
2. NOT BEING ABLE TO STOP OR CONTROL WORRYING: 0
6. BECOMING EASILY ANNOYED OR IRRITABLE: 0
5. BEING SO RESTLESS THAT IT IS HARD TO SIT STILL: 0
IF YOU CHECKED OFF ANY PROBLEMS ON THIS QUESTIONNAIRE, HOW DIFFICULT HAVE THESE PROBLEMS MADE IT FOR YOU TO DO YOUR WORK, TAKE CARE OF THINGS AT HOME, OR GET ALONG WITH OTHER PEOPLE: NOT DIFFICULT AT ALL
7. FEELING AFRAID AS IF SOMETHING AWFUL MIGHT HAPPEN: 0

## 2024-01-08 ASSESSMENT — ENCOUNTER SYMPTOMS: SHORTNESS OF BREATH: 0

## 2024-01-08 NOTE — PROGRESS NOTES
not jaundiced.      Findings: No rash.   Neurological:      General: No focal deficit present.      Mental Status: He is alert. Mental status is at baseline.      Cranial Nerves: No cranial nerve deficit.      Motor: No weakness.      Gait: Gait normal.   Psychiatric:         Mood and Affect: Mood normal.         Behavior: Behavior normal.         Thought Content: Thought content normal.         Judgment: Judgment normal.           Assessment/Plan:  Gume was seen today for diabetes.    Diagnoses and all orders for this visit:    Type 2 diabetes mellitus without complication, without long-term current use of insulin (HCC)  -     empagliflozin (JARDIANCE) 25 MG tablet; Take 1 tablet by mouth daily  -     SITagliptin-metFORMIN HCl ER (JANUMET XR) 100-1000 MG TB24; Take 1 tablet by mouth daily  -     Hemoglobin A1C; Future    Essential hypertension  -     carvedilol (COREG) 25 MG tablet; TAKE 1 TABLET BY MOUTH TWICE DAILY  -     furosemide (LASIX) 80 MG tablet; TAKE 1 TABLET BY MOUTH DAILY  -     CBC with Auto Differential; Future  -     Basic Metabolic Panel; Future    Other hyperlipidemia  -     atorvastatin (LIPITOR) 80 MG tablet; TAKE 1 TABLET BY MOUTH DAILY  -     Hepatic Function Panel; Future  -     Lipid Panel; Future    ASHD (arteriosclerotic heart disease)    Gastroesophageal reflux disease, unspecified whether esophagitis present    Screening for prostate cancer  -     PSA Screening; Future        Return in about 4 months (around 5/8/2024), or if symptoms worsen or fail to improve.  __  Simone Velásquez M.D.

## 2024-01-09 LAB
EST. AVERAGE GLUCOSE BLD GHB EST-MCNC: 151 MG/DL
HBA1C MFR BLD: 6.9 % (ref 4.8–5.6)

## 2024-01-12 ENCOUNTER — HOSPITAL ENCOUNTER (INPATIENT)
Age: 54
LOS: 2 days | Discharge: HOME | DRG: 322 | End: 2024-01-14
Attending: GENERAL PRACTICE | Admitting: INTERNAL MEDICINE
Payer: MEDICARE

## 2024-01-12 DIAGNOSIS — I21.3 ST ELEVATION MYOCARDIAL INFARCTION (STEMI), UNSPECIFIED ARTERY (HCC): Primary | ICD-10-CM

## 2024-01-12 DIAGNOSIS — I21.3 STEMI (ST ELEVATION MYOCARDIAL INFARCTION) (HCC): ICD-10-CM

## 2024-01-12 PROBLEM — I20.0 UNSTABLE ANGINA (HCC): Status: ACTIVE | Noted: 2024-01-12

## 2024-01-12 LAB
ACT BLD: 298 SECS (ref 70–128)
ECHO BSA: 2.35 M2
GLUCOSE BLD STRIP.AUTO-MCNC: 132 MG/DL (ref 65–100)
GLUCOSE BLD STRIP.AUTO-MCNC: 160 MG/DL (ref 65–100)
SERVICE CMNT-IMP: ABNORMAL
SERVICE CMNT-IMP: ABNORMAL
TROPONIN I SERPL HS-MCNC: ABNORMAL PG/ML (ref 0–14)
TROPONIN I SERPL HS-MCNC: ABNORMAL PG/ML (ref 0–14)

## 2024-01-12 PROCEDURE — 02C03ZZ EXTIRPATION OF MATTER FROM CORONARY ARTERY, ONE ARTERY, PERCUTANEOUS APPROACH: ICD-10-PCS | Performed by: INTERNAL MEDICINE

## 2024-01-12 PROCEDURE — C1725 CATH, TRANSLUMIN NON-LASER: HCPCS | Performed by: INTERNAL MEDICINE

## 2024-01-12 PROCEDURE — C1769 GUIDE WIRE: HCPCS | Performed by: INTERNAL MEDICINE

## 2024-01-12 PROCEDURE — 92941 PRQ TRLML REVSC TOT OCCL AMI: CPT | Performed by: INTERNAL MEDICINE

## 2024-01-12 PROCEDURE — C9606 PERC D-E COR REVASC W AMI S: HCPCS | Performed by: INTERNAL MEDICINE

## 2024-01-12 PROCEDURE — 99223 1ST HOSP IP/OBS HIGH 75: CPT | Performed by: INTERNAL MEDICINE

## 2024-01-12 PROCEDURE — 2500000003 HC RX 250 WO HCPCS: Performed by: INTERNAL MEDICINE

## 2024-01-12 PROCEDURE — B24BZZZ ULTRASONOGRAPHY OF HEART WITH AORTA: ICD-10-PCS | Performed by: INTERNAL MEDICINE

## 2024-01-12 PROCEDURE — 6360000002 HC RX W HCPCS: Performed by: NURSE PRACTITIONER

## 2024-01-12 PROCEDURE — 027036Z DILATION OF CORONARY ARTERY, ONE ARTERY WITH THREE DRUG-ELUTING INTRALUMINAL DEVICES, PERCUTANEOUS APPROACH: ICD-10-PCS | Performed by: INTERNAL MEDICINE

## 2024-01-12 PROCEDURE — 36415 COLL VENOUS BLD VENIPUNCTURE: CPT

## 2024-01-12 PROCEDURE — 6370000000 HC RX 637 (ALT 250 FOR IP): Performed by: PHYSICIAN ASSISTANT

## 2024-01-12 PROCEDURE — 2709999900 HC NON-CHARGEABLE SUPPLY: Performed by: INTERNAL MEDICINE

## 2024-01-12 PROCEDURE — 6360000002 HC RX W HCPCS: Performed by: PHYSICIAN ASSISTANT

## 2024-01-12 PROCEDURE — 6370000000 HC RX 637 (ALT 250 FOR IP): Performed by: INTERNAL MEDICINE

## 2024-01-12 PROCEDURE — C1894 INTRO/SHEATH, NON-LASER: HCPCS | Performed by: INTERNAL MEDICINE

## 2024-01-12 PROCEDURE — 2100000000 HC CCU R&B

## 2024-01-12 PROCEDURE — 4A023N7 MEASUREMENT OF CARDIAC SAMPLING AND PRESSURE, LEFT HEART, PERCUTANEOUS APPROACH: ICD-10-PCS | Performed by: INTERNAL MEDICINE

## 2024-01-12 PROCEDURE — C1757 CATH, THROMBECTOMY/EMBOLECT: HCPCS | Performed by: INTERNAL MEDICINE

## 2024-01-12 PROCEDURE — B2151ZZ FLUOROSCOPY OF LEFT HEART USING LOW OSMOLAR CONTRAST: ICD-10-PCS | Performed by: INTERNAL MEDICINE

## 2024-01-12 PROCEDURE — 93005 ELECTROCARDIOGRAM TRACING: CPT

## 2024-01-12 PROCEDURE — 92973 PRQ TRLUML C MCHN ASP THRMBC: CPT | Performed by: INTERNAL MEDICINE

## 2024-01-12 PROCEDURE — 96374 THER/PROPH/DIAG INJ IV PUSH: CPT

## 2024-01-12 PROCEDURE — 99152 MOD SED SAME PHYS/QHP 5/>YRS: CPT | Performed by: INTERNAL MEDICINE

## 2024-01-12 PROCEDURE — 6360000002 HC RX W HCPCS: Performed by: INTERNAL MEDICINE

## 2024-01-12 PROCEDURE — 99285 EMERGENCY DEPT VISIT HI MDM: CPT

## 2024-01-12 PROCEDURE — 82962 GLUCOSE BLOOD TEST: CPT

## 2024-01-12 PROCEDURE — C1887 CATHETER, GUIDING: HCPCS | Performed by: INTERNAL MEDICINE

## 2024-01-12 PROCEDURE — 6370000000 HC RX 637 (ALT 250 FOR IP): Performed by: GENERAL PRACTICE

## 2024-01-12 PROCEDURE — 93005 ELECTROCARDIOGRAM TRACING: CPT | Performed by: INTERNAL MEDICINE

## 2024-01-12 PROCEDURE — 2580000003 HC RX 258: Performed by: INTERNAL MEDICINE

## 2024-01-12 PROCEDURE — 6360000002 HC RX W HCPCS: Performed by: GENERAL PRACTICE

## 2024-01-12 PROCEDURE — 99153 MOD SED SAME PHYS/QHP EA: CPT | Performed by: INTERNAL MEDICINE

## 2024-01-12 PROCEDURE — 93458 L HRT ARTERY/VENTRICLE ANGIO: CPT | Performed by: INTERNAL MEDICINE

## 2024-01-12 PROCEDURE — 84484 ASSAY OF TROPONIN QUANT: CPT

## 2024-01-12 PROCEDURE — 6360000004 HC RX CONTRAST MEDICATION: Performed by: INTERNAL MEDICINE

## 2024-01-12 PROCEDURE — C1874 STENT, COATED/COV W/DEL SYS: HCPCS | Performed by: INTERNAL MEDICINE

## 2024-01-12 PROCEDURE — 85347 COAGULATION TIME ACTIVATED: CPT

## 2024-01-12 DEVICE — STENT ONYXNG20026UX ONYX 2.00X26RX
Type: IMPLANTABLE DEVICE | Status: FUNCTIONAL
Brand: ONYX FRONTIER™

## 2024-01-12 DEVICE — STENT ONYXNG20008UX ONYX 2.00X08RX
Type: IMPLANTABLE DEVICE | Status: FUNCTIONAL
Brand: ONYX FRONTIER™

## 2024-01-12 DEVICE — STENT ONYXNG30012UX ONYX 3.00X12RX
Type: IMPLANTABLE DEVICE | Status: FUNCTIONAL
Brand: ONYX FRONTIER™

## 2024-01-12 RX ORDER — PROCHLORPERAZINE EDISYLATE 5 MG/ML
10 INJECTION INTRAMUSCULAR; INTRAVENOUS EVERY 6 HOURS PRN
Status: DISCONTINUED | OUTPATIENT
Start: 2024-01-12 | End: 2024-01-14 | Stop reason: HOSPADM

## 2024-01-12 RX ORDER — IBUPROFEN 600 MG/1
1 TABLET ORAL PRN
Status: DISCONTINUED | OUTPATIENT
Start: 2024-01-12 | End: 2024-01-14 | Stop reason: HOSPADM

## 2024-01-12 RX ORDER — HEPARIN SODIUM 200 [USP'U]/100ML
INJECTION, SOLUTION INTRAVENOUS CONTINUOUS PRN
Status: DISCONTINUED | OUTPATIENT
Start: 2024-01-12 | End: 2024-01-12 | Stop reason: HOSPADM

## 2024-01-12 RX ORDER — ONDANSETRON 2 MG/ML
INJECTION INTRAMUSCULAR; INTRAVENOUS PRN
Status: DISCONTINUED | OUTPATIENT
Start: 2024-01-12 | End: 2024-01-12 | Stop reason: HOSPADM

## 2024-01-12 RX ORDER — SODIUM CHLORIDE 9 MG/ML
INJECTION, SOLUTION INTRAVENOUS PRN
Status: DISCONTINUED | OUTPATIENT
Start: 2024-01-12 | End: 2024-01-13

## 2024-01-12 RX ORDER — INSULIN LISPRO 100 [IU]/ML
0-4 INJECTION, SOLUTION INTRAVENOUS; SUBCUTANEOUS NIGHTLY
Status: DISCONTINUED | OUTPATIENT
Start: 2024-01-12 | End: 2024-01-14 | Stop reason: HOSPADM

## 2024-01-12 RX ORDER — POTASSIUM CHLORIDE 20 MEQ/1
40 TABLET, EXTENDED RELEASE ORAL PRN
Status: DISCONTINUED | OUTPATIENT
Start: 2024-01-12 | End: 2024-01-14 | Stop reason: HOSPADM

## 2024-01-12 RX ORDER — MAGNESIUM HYDROXIDE/ALUMINUM HYDROXICE/SIMETHICONE 120; 1200; 1200 MG/30ML; MG/30ML; MG/30ML
SUSPENSION ORAL PRN
Status: DISCONTINUED | OUTPATIENT
Start: 2024-01-12 | End: 2024-01-12 | Stop reason: HOSPADM

## 2024-01-12 RX ORDER — HYDROMORPHONE HYDROCHLORIDE 2 MG/ML
INJECTION, SOLUTION INTRAMUSCULAR; INTRAVENOUS; SUBCUTANEOUS PRN
Status: DISCONTINUED | OUTPATIENT
Start: 2024-01-12 | End: 2024-01-12 | Stop reason: HOSPADM

## 2024-01-12 RX ORDER — DEXTROSE MONOHYDRATE 100 MG/ML
INJECTION, SOLUTION INTRAVENOUS CONTINUOUS PRN
Status: DISCONTINUED | OUTPATIENT
Start: 2024-01-12 | End: 2024-01-14 | Stop reason: HOSPADM

## 2024-01-12 RX ORDER — SODIUM CHLORIDE 0.9 % (FLUSH) 0.9 %
5-40 SYRINGE (ML) INJECTION PRN
Status: DISCONTINUED | OUTPATIENT
Start: 2024-01-12 | End: 2024-01-14 | Stop reason: HOSPADM

## 2024-01-12 RX ORDER — ATORVASTATIN CALCIUM 80 MG/1
80 TABLET, FILM COATED ORAL NIGHTLY
Status: DISCONTINUED | OUTPATIENT
Start: 2024-01-12 | End: 2024-01-14 | Stop reason: HOSPADM

## 2024-01-12 RX ORDER — EPTIFIBATIDE 2 MG/ML
INJECTION, SOLUTION INTRAVENOUS PRN
Status: DISCONTINUED | OUTPATIENT
Start: 2024-01-12 | End: 2024-01-12 | Stop reason: HOSPADM

## 2024-01-12 RX ORDER — POTASSIUM CHLORIDE 7.45 MG/ML
10 INJECTION INTRAVENOUS PRN
Status: DISCONTINUED | OUTPATIENT
Start: 2024-01-12 | End: 2024-01-14 | Stop reason: HOSPADM

## 2024-01-12 RX ORDER — MIDAZOLAM HYDROCHLORIDE 1 MG/ML
INJECTION INTRAMUSCULAR; INTRAVENOUS PRN
Status: DISCONTINUED | OUTPATIENT
Start: 2024-01-12 | End: 2024-01-12 | Stop reason: HOSPADM

## 2024-01-12 RX ORDER — NITROGLYCERIN 20 MG/100ML
INJECTION INTRAVENOUS PRN
Status: DISCONTINUED | OUTPATIENT
Start: 2024-01-12 | End: 2024-01-12 | Stop reason: HOSPADM

## 2024-01-12 RX ORDER — HEPARIN SODIUM 1000 [USP'U]/ML
INJECTION, SOLUTION INTRAVENOUS; SUBCUTANEOUS DAILY PRN
Status: COMPLETED | OUTPATIENT
Start: 2024-01-12 | End: 2024-01-12

## 2024-01-12 RX ORDER — POLYETHYLENE GLYCOL 3350 17 G/17G
17 POWDER, FOR SOLUTION ORAL DAILY PRN
Status: DISCONTINUED | OUTPATIENT
Start: 2024-01-12 | End: 2024-01-14 | Stop reason: HOSPADM

## 2024-01-12 RX ORDER — MORPHINE SULFATE 10 MG/ML
4 INJECTION, SOLUTION INTRAMUSCULAR; INTRAVENOUS
Status: DISCONTINUED | OUTPATIENT
Start: 2024-01-12 | End: 2024-01-14 | Stop reason: HOSPADM

## 2024-01-12 RX ORDER — SODIUM CHLORIDE 0.9 % (FLUSH) 0.9 %
5-40 SYRINGE (ML) INJECTION EVERY 12 HOURS SCHEDULED
Status: DISCONTINUED | OUTPATIENT
Start: 2024-01-12 | End: 2024-01-14 | Stop reason: HOSPADM

## 2024-01-12 RX ORDER — LIDOCAINE HYDROCHLORIDE 10 MG/ML
INJECTION, SOLUTION INFILTRATION; PERINEURAL PRN
Status: DISCONTINUED | OUTPATIENT
Start: 2024-01-12 | End: 2024-01-12 | Stop reason: HOSPADM

## 2024-01-12 RX ORDER — MAGNESIUM SULFATE IN WATER 40 MG/ML
2000 INJECTION, SOLUTION INTRAVENOUS PRN
Status: DISCONTINUED | OUTPATIENT
Start: 2024-01-12 | End: 2024-01-14 | Stop reason: HOSPADM

## 2024-01-12 RX ORDER — ACETAMINOPHEN 325 MG/1
650 TABLET ORAL EVERY 6 HOURS PRN
Status: DISCONTINUED | OUTPATIENT
Start: 2024-01-12 | End: 2024-01-14 | Stop reason: HOSPADM

## 2024-01-12 RX ORDER — MORPHINE SULFATE 10 MG/ML
2 INJECTION, SOLUTION INTRAMUSCULAR; INTRAVENOUS
Status: DISCONTINUED | OUTPATIENT
Start: 2024-01-12 | End: 2024-01-14 | Stop reason: HOSPADM

## 2024-01-12 RX ORDER — HYDROCODONE BITARTRATE AND ACETAMINOPHEN 5; 325 MG/1; MG/1
2 TABLET ORAL EVERY 4 HOURS PRN
Status: DISCONTINUED | OUTPATIENT
Start: 2024-01-12 | End: 2024-01-14 | Stop reason: HOSPADM

## 2024-01-12 RX ORDER — HEPARIN SODIUM 10000 [USP'U]/ML
INJECTION, SOLUTION INTRAVENOUS; SUBCUTANEOUS PRN
Status: DISCONTINUED | OUTPATIENT
Start: 2024-01-12 | End: 2024-01-12 | Stop reason: HOSPADM

## 2024-01-12 RX ORDER — EPTIFIBATIDE 0.75 MG/ML
2 INJECTION, SOLUTION INTRAVENOUS CONTINUOUS
Status: DISCONTINUED | OUTPATIENT
Start: 2024-01-12 | End: 2024-01-13

## 2024-01-12 RX ORDER — HYDRALAZINE HYDROCHLORIDE 20 MG/ML
10 INJECTION INTRAMUSCULAR; INTRAVENOUS ONCE
Status: DISCONTINUED | OUTPATIENT
Start: 2024-01-12 | End: 2024-01-12

## 2024-01-12 RX ORDER — HYDROCODONE BITARTRATE AND ACETAMINOPHEN 5; 325 MG/1; MG/1
1 TABLET ORAL EVERY 4 HOURS PRN
Status: DISCONTINUED | OUTPATIENT
Start: 2024-01-12 | End: 2024-01-14 | Stop reason: HOSPADM

## 2024-01-12 RX ORDER — HYDRALAZINE HYDROCHLORIDE 20 MG/ML
20 INJECTION INTRAMUSCULAR; INTRAVENOUS ONCE
Status: COMPLETED | OUTPATIENT
Start: 2024-01-12 | End: 2024-01-12

## 2024-01-12 RX ORDER — CLOPIDOGREL BISULFATE 75 MG/1
75 TABLET ORAL DAILY
Status: DISCONTINUED | OUTPATIENT
Start: 2024-01-13 | End: 2024-01-14 | Stop reason: HOSPADM

## 2024-01-12 RX ORDER — NITROGLYCERIN 0.4 MG/1
0.4 TABLET SUBLINGUAL EVERY 5 MIN PRN
Status: DISCONTINUED | OUTPATIENT
Start: 2024-01-12 | End: 2024-01-14 | Stop reason: HOSPADM

## 2024-01-12 RX ORDER — ASPIRIN 81 MG/1
TABLET, CHEWABLE ORAL DAILY PRN
Status: COMPLETED | OUTPATIENT
Start: 2024-01-12 | End: 2024-01-12

## 2024-01-12 RX ORDER — INSULIN LISPRO 100 [IU]/ML
0-4 INJECTION, SOLUTION INTRAVENOUS; SUBCUTANEOUS
Status: DISCONTINUED | OUTPATIENT
Start: 2024-01-12 | End: 2024-01-14 | Stop reason: HOSPADM

## 2024-01-12 RX ORDER — CLOPIDOGREL BISULFATE 75 MG/1
TABLET ORAL PRN
Status: DISCONTINUED | OUTPATIENT
Start: 2024-01-12 | End: 2024-01-12 | Stop reason: HOSPADM

## 2024-01-12 RX ORDER — ACETAMINOPHEN 325 MG/1
650 TABLET ORAL EVERY 4 HOURS PRN
Status: DISCONTINUED | OUTPATIENT
Start: 2024-01-12 | End: 2024-01-14 | Stop reason: HOSPADM

## 2024-01-12 RX ORDER — ONDANSETRON 2 MG/ML
4 INJECTION INTRAMUSCULAR; INTRAVENOUS EVERY 6 HOURS PRN
Status: DISCONTINUED | OUTPATIENT
Start: 2024-01-12 | End: 2024-01-14 | Stop reason: HOSPADM

## 2024-01-12 RX ORDER — ATROPINE SULFATE 0.1 MG/ML
INJECTION INTRAVENOUS PRN
Status: DISCONTINUED | OUTPATIENT
Start: 2024-01-12 | End: 2024-01-12 | Stop reason: HOSPADM

## 2024-01-12 RX ORDER — ASPIRIN 81 MG/1
81 TABLET ORAL DAILY
Status: DISCONTINUED | OUTPATIENT
Start: 2024-01-13 | End: 2024-01-14 | Stop reason: HOSPADM

## 2024-01-12 RX ORDER — EPTIFIBATIDE 0.75 MG/ML
INJECTION, SOLUTION INTRAVENOUS CONTINUOUS PRN
Status: COMPLETED | OUTPATIENT
Start: 2024-01-12 | End: 2024-01-12

## 2024-01-12 RX ORDER — CARVEDILOL 25 MG/1
25 TABLET ORAL 2 TIMES DAILY WITH MEALS
Status: DISCONTINUED | OUTPATIENT
Start: 2024-01-12 | End: 2024-01-14 | Stop reason: HOSPADM

## 2024-01-12 RX ORDER — ONDANSETRON 4 MG/1
4 TABLET, ORALLY DISINTEGRATING ORAL EVERY 8 HOURS PRN
Status: DISCONTINUED | OUTPATIENT
Start: 2024-01-12 | End: 2024-01-14 | Stop reason: HOSPADM

## 2024-01-12 RX ORDER — LOSARTAN POTASSIUM 50 MG/1
100 TABLET ORAL DAILY
Status: DISCONTINUED | OUTPATIENT
Start: 2024-01-12 | End: 2024-01-14 | Stop reason: HOSPADM

## 2024-01-12 RX ADMIN — LOSARTAN POTASSIUM 100 MG: 50 TABLET, FILM COATED ORAL at 18:01

## 2024-01-12 RX ADMIN — ASPIRIN 81 MG 324 MG: 81 TABLET ORAL at 14:30

## 2024-01-12 RX ADMIN — RIVAROXABAN 2.5 MG: 2.5 TABLET, FILM COATED ORAL at 23:05

## 2024-01-12 RX ADMIN — SODIUM CHLORIDE, PRESERVATIVE FREE 10 ML: 5 INJECTION INTRAVENOUS at 21:36

## 2024-01-12 RX ADMIN — HEPARIN SODIUM 4000 UNITS: 1000 INJECTION, SOLUTION INTRAVENOUS; SUBCUTANEOUS at 14:30

## 2024-01-12 RX ADMIN — HYDROCODONE BITARTRATE AND ACETAMINOPHEN 1 TABLET: 5; 325 TABLET ORAL at 17:22

## 2024-01-12 RX ADMIN — PROCHLORPERAZINE EDISYLATE 10 MG: 5 INJECTION INTRAMUSCULAR; INTRAVENOUS at 21:36

## 2024-01-12 RX ADMIN — ONDANSETRON 4 MG: 2 INJECTION INTRAMUSCULAR; INTRAVENOUS at 19:49

## 2024-01-12 RX ADMIN — EPTIFIBATIDE 2 MCG/KG/MIN: 0.75 INJECTION INTRAVENOUS at 18:36

## 2024-01-12 RX ADMIN — HYDRALAZINE HYDROCHLORIDE 20 MG: 20 INJECTION, SOLUTION INTRAMUSCULAR; INTRAVENOUS at 17:57

## 2024-01-12 RX ADMIN — ATORVASTATIN CALCIUM 80 MG: 80 TABLET, FILM COATED ORAL at 23:05

## 2024-01-12 RX ADMIN — CARVEDILOL 25 MG: 25 TABLET, FILM COATED ORAL at 17:57

## 2024-01-12 ASSESSMENT — PAIN DESCRIPTION - LOCATION
LOCATION: CHEST
LOCATION: CHEST

## 2024-01-12 ASSESSMENT — PAIN DESCRIPTION - ORIENTATION
ORIENTATION: ANTERIOR
ORIENTATION: MID

## 2024-01-12 ASSESSMENT — PAIN SCALES - GENERAL
PAINLEVEL_OUTOF10: 0
PAINLEVEL_OUTOF10: 5
PAINLEVEL_OUTOF10: 5
PAINLEVEL_OUTOF10: 0

## 2024-01-12 ASSESSMENT — PAIN DESCRIPTION - DESCRIPTORS: DESCRIPTORS: SORE

## 2024-01-12 NOTE — PLAN OF CARE
Problem: Chronic Conditions and Co-morbidities  Goal: Patient's chronic conditions and co-morbidity symptoms are monitored and maintained or improved  Outcome: Progressing  Flowsheets (Taken 1/12/2024 1630)  Care Plan - Patient's Chronic Conditions and Co-Morbidity Symptoms are Monitored and Maintained or Improved:   Monitor and assess patient's chronic conditions and comorbid symptoms for stability, deterioration, or improvement   Collaborate with multidisciplinary team to address chronic and comorbid conditions and prevent exacerbation or deterioration

## 2024-01-12 NOTE — H&P
In this split/shared evaluation I performed reviewed the patients's H&P, available images, labs, cultures., discussed case in detail with ACP, performed a medically appropriate history and exam, counseled and educated the patient and/or family member, ordered and/or reviewed medications, tests or procedures, documented information in EMR, independently interpreted images and coordinated care.     Personal Time: 45 minutes -this equates to greater than 50% of total time in patient consultation/care.        Patient seen and examined by me.  Agree with above note by physician extender.  Key findings are: 53-year-old gentleman with history of known coronary artery disease, hypertension dyslipidemia and diabetes with history of ST elevation myocardial infarction in 2017 with complex PCI and stenting of severely ectatic right coronary artery.  LVEF normalized by echocardiogram 01/2022.  Patient reports medical compliance and following in the office on a regular basis.  He presents now with severe 10 out of 10 chest pain and inferior ST elevation consistent with inferior myocardial infarction.    Vitals:    01/12/24 1420 01/12/24 1432 01/12/24 1433   BP: (!) 163/104  (!) 145/101   Pulse: 67  71   Resp: 17  18   Temp: 98.9 °F (37.2 °C)     SpO2: 99%  99%   Weight:  104.3 kg (230 lb)    Height:  1.905 m (6' 3\")         General: Patient is alert and oriented, no apparent distress  HEENT: Pupils equal reactive, oropharynx clear  Chest: Clear to auscultation bilaterally  Cardiovascular: S1-S2 regular with no murmur  Abdomen: Soft positive bowel sounds  Extremities: Soft no edema with intact distal pulses    Principal Problem:    STEMI (ST elevation myocardial infarction) ([facility])  Plan: Patient with acute inferior myocardial infarction.  Risk benefits of cardiac catheterization discussed patient is willing to proceed.  Review of films demonstrate complex diffuse ectatic coronary artery disease including all 3 major epicardial

## 2024-01-12 NOTE — PROGRESS NOTES
Pt hypertensive post procedure     MD JAMES contacted verbal orders to give PO medications early and to give 20 mg of hydralazine IVP now     Orders placed

## 2024-01-12 NOTE — ED PROVIDER NOTES
at this time.         Review of Systems   All other systems reviewed and are negative.      Physical Exam     Vitals signs and nursing note reviewed:  Vitals:    01/12/24 1420 01/12/24 1432 01/12/24 1433   BP: (!) 163/104  (!) 145/101   Pulse: 67  71   Resp: 17  18   Temp: 98.9 °F (37.2 °C)     SpO2: 99%  99%   Weight:  104.3 kg (230 lb)    Height:  1.905 m (6' 3\")       Physical Exam  Constitutional:       General: He is in acute distress.      Appearance: He is ill-appearing.      Comments: Diaphoretic   HENT:      Head: Normocephalic and atraumatic.      Right Ear: External ear normal.      Left Ear: External ear normal.      Nose: No congestion or rhinorrhea.      Mouth/Throat:      Mouth: Mucous membranes are moist.   Eyes:      Extraocular Movements: Extraocular movements intact.      Pupils: Pupils are equal, round, and reactive to light.   Cardiovascular:      Rate and Rhythm: Normal rate and regular rhythm.      Heart sounds: Normal heart sounds.   Pulmonary:      Effort: Pulmonary effort is normal.      Breath sounds: Normal breath sounds.   Abdominal:      General: There is no distension.      Palpations: Abdomen is soft.      Tenderness: There is no abdominal tenderness.   Musculoskeletal:         General: No swelling or tenderness. Normal range of motion.      Cervical back: Normal range of motion.   Skin:     Findings: No rash.   Neurological:      General: No focal deficit present.      Mental Status: He is alert and oriented to person, place, and time.   Psychiatric:         Mood and Affect: Mood normal.          Procedures     EKG 12 Lead    Date/Time: 1/12/2024 2:24 PM    Performed by: Jamin Mcgovern DO  Authorized by: Jamin Mcgovern DO    ECG interpreted by ED Physician in the absence of a cardiologist: yes    Previous ECG:     Previous ECG:  Compared to current    Similarity:  Changes noted    Comparison ECG info:  10/2022  Interpretation:     Interpretation: abnormal    Rate:     ECG rate:  64

## 2024-01-12 NOTE — PROGRESS NOTES
TRANSFER - IN REPORT:    Verbal report received from CLAUDINE RN on Gume Guillen Jr.  being received from CATH LAB for routine post-op      Report consisted of patient's Situation, Background, Assessment and   Recommendations(SBAR).     Information from the following report(s) Nurse Handoff Report, Index, ED Encounter Summary, ED SBAR, Adult Overview, Surgery Report, Intake/Output, MAR, Recent Results, Med Rec Status, Cardiac Rhythm NSR, Alarm Parameters, and Quality Measures was reviewed with the receiving nurse.    Opportunity for questions and clarification was provided.      Assessment completed upon patient's arrival to unit and care assumed.

## 2024-01-12 NOTE — PROGRESS NOTES
TRANSFER - OUT REPORT:    Verbal report given to EDMAR Herrera on Gume Guillen Jr.  being transferred to [facility] for routine progression of patient care       Report consisted of patient's Situation, Background, Assessment and   Recommendations(SBAR).     Information from the following report(s) Nurse Handoff Report and MAR was reviewed with the receiving nurse.      STEMI w/ Dr. Leyva  3 stents to PDA  R radial access  TR band to R radial @ 12mL  No s/sxs of bleeding or hematoma to R radial access site    Heparin 13,000 units   Versed 4mg IV  Fentanyl 50mcg IV  Integrilin bolus and gtt, gtt started at 1505 AND IS TO RUN FOR 12 HOURS  Atropine 1mg IV  Zofran 4mg IV  Dilaudid 1mg IV  Nitroglycerin 150 IV  Plavix 600mg po   Mylanta 30mL

## 2024-01-12 NOTE — ED TRIAGE NOTES
Pt arrives to the ER with c/o chest pain x 1 day. Pt states today pain has increased today. Pt states hx of chf and heart attack in 2017 with stint placement.

## 2024-01-13 ENCOUNTER — APPOINTMENT (OUTPATIENT)
Dept: NON INVASIVE DIAGNOSTICS | Age: 54
DRG: 322 | End: 2024-01-13
Attending: INTERNAL MEDICINE
Payer: MEDICARE

## 2024-01-13 LAB
ANION GAP SERPL CALC-SCNC: 4 MMOL/L (ref 2–11)
BUN SERPL-MCNC: 12 MG/DL (ref 6–23)
CALCIUM SERPL-MCNC: 8.5 MG/DL (ref 8.3–10.4)
CHLORIDE SERPL-SCNC: 112 MMOL/L (ref 103–113)
CHOLEST SERPL-MCNC: 112 MG/DL
CO2 SERPL-SCNC: 22 MMOL/L (ref 21–32)
CREAT SERPL-MCNC: 0.9 MG/DL (ref 0.8–1.5)
ECHO AO ASC DIAM: 3.7 CM
ECHO AO ASCENDING AORTA INDEX: 1.59 CM/M2
ECHO AO ROOT DIAM: 3.6 CM
ECHO AO ROOT INDEX: 1.55 CM/M2
ECHO AV AREA PEAK VELOCITY: 3.4 CM2
ECHO AV AREA VTI: 3.8 CM2
ECHO AV AREA/BSA PEAK VELOCITY: 1.5 CM2/M2
ECHO AV AREA/BSA VTI: 1.6 CM2/M2
ECHO AV MEAN GRADIENT: 4 MMHG
ECHO AV MEAN VELOCITY: 0.9 M/S
ECHO AV PEAK GRADIENT: 9 MMHG
ECHO AV PEAK VELOCITY: 1.5 M/S
ECHO AV VELOCITY RATIO: 0.87
ECHO AV VTI: 27.8 CM
ECHO BSA: 2.35 M2
ECHO IVC PROX: 2.1 CM
ECHO LA AREA 2C: 21.2 CM2
ECHO LA AREA 4C: 19.9 CM2
ECHO LA DIAMETER INDEX: 1.67 CM/M2
ECHO LA DIAMETER: 3.9 CM
ECHO LA MAJOR AXIS: 5.9 CM
ECHO LA MINOR AXIS: 5.5 CM
ECHO LA TO AORTIC ROOT RATIO: 1.08
ECHO LA VOL BP: 61 ML (ref 18–58)
ECHO LA VOL MOD A2C: 66 ML (ref 18–58)
ECHO LA VOL MOD A4C: 53 ML (ref 18–58)
ECHO LA VOL/BSA BIPLANE: 26 ML/M2 (ref 16–34)
ECHO LA VOLUME INDEX MOD A2C: 28 ML/M2 (ref 16–34)
ECHO LA VOLUME INDEX MOD A4C: 23 ML/M2 (ref 16–34)
ECHO LV E' LATERAL VELOCITY: 10 CM/S
ECHO LV E' SEPTAL VELOCITY: 8 CM/S
ECHO LV EDV A2C: 133 ML
ECHO LV EDV A4C: 143 ML
ECHO LV EDV INDEX A4C: 61 ML/M2
ECHO LV EDV NDEX A2C: 57 ML/M2
ECHO LV EJECTION FRACTION A2C: 55 %
ECHO LV EJECTION FRACTION A4C: 48 %
ECHO LV EJECTION FRACTION BIPLANE: 51 % (ref 55–100)
ECHO LV ESV A2C: 60 ML
ECHO LV ESV A4C: 75 ML
ECHO LV ESV INDEX A2C: 26 ML/M2
ECHO LV ESV INDEX A4C: 32 ML/M2
ECHO LV FRACTIONAL SHORTENING: 28 % (ref 28–44)
ECHO LV INTERNAL DIMENSION DIASTOLE INDEX: 2.32 CM/M2
ECHO LV INTERNAL DIMENSION DIASTOLIC: 5.4 CM (ref 4.2–5.9)
ECHO LV INTERNAL DIMENSION SYSTOLIC INDEX: 1.67 CM/M2
ECHO LV INTERNAL DIMENSION SYSTOLIC: 3.9 CM
ECHO LV IVSD: 1.2 CM (ref 0.6–1)
ECHO LV MASS 2D: 264.4 G (ref 88–224)
ECHO LV MASS INDEX 2D: 113.5 G/M2 (ref 49–115)
ECHO LV POSTERIOR WALL DIASTOLIC: 1.2 CM (ref 0.6–1)
ECHO LV RELATIVE WALL THICKNESS RATIO: 0.44
ECHO LVOT AREA: 4.2 CM2
ECHO LVOT AV VTI INDEX: 0.9
ECHO LVOT DIAM: 2.3 CM
ECHO LVOT MEAN GRADIENT: 3 MMHG
ECHO LVOT PEAK GRADIENT: 6 MMHG
ECHO LVOT PEAK VELOCITY: 1.3 M/S
ECHO LVOT STROKE VOLUME INDEX: 44.7 ML/M2
ECHO LVOT SV: 104.2 ML
ECHO LVOT VTI: 25.1 CM
ECHO MV A VELOCITY: 0.83 M/S
ECHO MV E DECELERATION TIME (DT): 188 MS
ECHO MV E VELOCITY: 0.74 M/S
ECHO MV E/A RATIO: 0.89
ECHO MV E/E' LATERAL: 7.4
ECHO MV E/E' RATIO (AVERAGED): 8.33
ECHO PV MAX VELOCITY: 0.9 M/S
ECHO PV PEAK GRADIENT: 3 MMHG
ECHO RV BASAL DIMENSION: 3.4 CM
ECHO RV TAPSE: 1.9 CM (ref 1.7–?)
EKG ATRIAL RATE: 62 BPM
EKG ATRIAL RATE: 65 BPM
EKG ATRIAL RATE: 80 BPM
EKG DIAGNOSIS: NORMAL
EKG P AXIS: 47 DEGREES
EKG P AXIS: 51 DEGREES
EKG P AXIS: 61 DEGREES
EKG P-R INTERVAL: 170 MS
EKG P-R INTERVAL: 178 MS
EKG P-R INTERVAL: 194 MS
EKG Q-T INTERVAL: 356 MS
EKG Q-T INTERVAL: 382 MS
EKG Q-T INTERVAL: 414 MS
EKG QRS DURATION: 90 MS
EKG QRS DURATION: 90 MS
EKG QRS DURATION: 98 MS
EKG QTC CALCULATION (BAZETT): 397 MS
EKG QTC CALCULATION (BAZETT): 410 MS
EKG QTC CALCULATION (BAZETT): 420 MS
EKG R AXIS: -23 DEGREES
EKG R AXIS: -24 DEGREES
EKG R AXIS: -35 DEGREES
EKG T AXIS: -27 DEGREES
EKG T AXIS: -9 DEGREES
EKG T AXIS: 0 DEGREES
EKG VENTRICULAR RATE: 62 BPM
EKG VENTRICULAR RATE: 65 BPM
EKG VENTRICULAR RATE: 80 BPM
ERYTHROCYTE [DISTWIDTH] IN BLOOD BY AUTOMATED COUNT: 13.4 % (ref 11.9–14.6)
GLUCOSE BLD STRIP.AUTO-MCNC: 108 MG/DL (ref 65–100)
GLUCOSE BLD STRIP.AUTO-MCNC: 128 MG/DL (ref 65–100)
GLUCOSE BLD STRIP.AUTO-MCNC: 129 MG/DL (ref 65–100)
GLUCOSE BLD STRIP.AUTO-MCNC: 193 MG/DL (ref 65–100)
GLUCOSE SERPL-MCNC: 118 MG/DL (ref 65–100)
HCT VFR BLD AUTO: 46.7 % (ref 41.1–50.3)
HDLC SERPL-MCNC: 38 MG/DL (ref 40–60)
HDLC SERPL: 2.9
HGB BLD-MCNC: 15.3 G/DL (ref 13.6–17.2)
LDLC SERPL CALC-MCNC: 61.2 MG/DL
MAGNESIUM SERPL-MCNC: 2.3 MG/DL (ref 1.8–2.4)
MCH RBC QN AUTO: 29.8 PG (ref 26.1–32.9)
MCHC RBC AUTO-ENTMCNC: 32.8 G/DL (ref 31.4–35)
MCV RBC AUTO: 91 FL (ref 82–102)
NRBC # BLD: 0 K/UL (ref 0–0.2)
PLATELET # BLD AUTO: 199 K/UL (ref 150–450)
PMV BLD AUTO: 8.7 FL (ref 9.4–12.3)
POTASSIUM SERPL-SCNC: 3.4 MMOL/L (ref 3.5–5.1)
RBC # BLD AUTO: 5.13 M/UL (ref 4.23–5.6)
SERVICE CMNT-IMP: ABNORMAL
SODIUM SERPL-SCNC: 138 MMOL/L (ref 136–146)
TRIGL SERPL-MCNC: 64 MG/DL (ref 35–150)
TROPONIN I SERPL HS-MCNC: ABNORMAL PG/ML (ref 0–14)
VLDLC SERPL CALC-MCNC: 12.8 MG/DL (ref 6–23)
WBC # BLD AUTO: 13.6 K/UL (ref 4.3–11.1)

## 2024-01-13 PROCEDURE — 6370000000 HC RX 637 (ALT 250 FOR IP): Performed by: PHYSICIAN ASSISTANT

## 2024-01-13 PROCEDURE — A4216 STERILE WATER/SALINE, 10 ML: HCPCS | Performed by: INTERNAL MEDICINE

## 2024-01-13 PROCEDURE — 99233 SBSQ HOSP IP/OBS HIGH 50: CPT | Performed by: INTERNAL MEDICINE

## 2024-01-13 PROCEDURE — 2580000003 HC RX 258: Performed by: INTERNAL MEDICINE

## 2024-01-13 PROCEDURE — C8929 TTE W OR WO FOL WCON,DOPPLER: HCPCS

## 2024-01-13 PROCEDURE — 2580000003 HC RX 258: Performed by: PHYSICIAN ASSISTANT

## 2024-01-13 PROCEDURE — 36415 COLL VENOUS BLD VENIPUNCTURE: CPT

## 2024-01-13 PROCEDURE — 84484 ASSAY OF TROPONIN QUANT: CPT

## 2024-01-13 PROCEDURE — 6360000002 HC RX W HCPCS: Performed by: INTERNAL MEDICINE

## 2024-01-13 PROCEDURE — 6370000000 HC RX 637 (ALT 250 FOR IP): Performed by: INTERNAL MEDICINE

## 2024-01-13 PROCEDURE — 82962 GLUCOSE BLOOD TEST: CPT

## 2024-01-13 PROCEDURE — 85027 COMPLETE CBC AUTOMATED: CPT

## 2024-01-13 PROCEDURE — 93005 ELECTROCARDIOGRAM TRACING: CPT | Performed by: INTERNAL MEDICINE

## 2024-01-13 PROCEDURE — 93010 ELECTROCARDIOGRAM REPORT: CPT | Performed by: INTERNAL MEDICINE

## 2024-01-13 PROCEDURE — 80048 BASIC METABOLIC PNL TOTAL CA: CPT

## 2024-01-13 PROCEDURE — 6360000004 HC RX CONTRAST MEDICATION: Performed by: INTERNAL MEDICINE

## 2024-01-13 PROCEDURE — 2140000000 HC CCU INTERMEDIATE R&B

## 2024-01-13 PROCEDURE — 80061 LIPID PANEL: CPT

## 2024-01-13 PROCEDURE — 83735 ASSAY OF MAGNESIUM: CPT

## 2024-01-13 RX ADMIN — ATORVASTATIN CALCIUM 80 MG: 80 TABLET, FILM COATED ORAL at 20:37

## 2024-01-13 RX ADMIN — ONDANSETRON 4 MG: 4 TABLET, ORALLY DISINTEGRATING ORAL at 16:28

## 2024-01-13 RX ADMIN — RIVAROXABAN 2.5 MG: 2.5 TABLET, FILM COATED ORAL at 08:30

## 2024-01-13 RX ADMIN — SODIUM CHLORIDE, PRESERVATIVE FREE 10 ML: 5 INJECTION INTRAVENOUS at 08:18

## 2024-01-13 RX ADMIN — EPTIFIBATIDE 2 MCG/KG/MIN: 0.75 INJECTION INTRAVENOUS at 01:02

## 2024-01-13 RX ADMIN — CARVEDILOL 25 MG: 25 TABLET, FILM COATED ORAL at 08:17

## 2024-01-13 RX ADMIN — LOSARTAN POTASSIUM 100 MG: 50 TABLET, FILM COATED ORAL at 08:17

## 2024-01-13 RX ADMIN — EMPAGLIFLOZIN 25 MG: 10 TABLET, FILM COATED ORAL at 08:17

## 2024-01-13 RX ADMIN — CLOPIDOGREL BISULFATE 75 MG: 75 TABLET ORAL at 08:17

## 2024-01-13 RX ADMIN — SODIUM CHLORIDE, PRESERVATIVE FREE 10 ML: 5 INJECTION INTRAVENOUS at 20:44

## 2024-01-13 RX ADMIN — SODIUM CHLORIDE, PRESERVATIVE FREE 10 ML: 5 INJECTION INTRAVENOUS at 03:03

## 2024-01-13 RX ADMIN — ASPIRIN 81 MG: 81 TABLET, COATED ORAL at 08:17

## 2024-01-13 RX ADMIN — PERFLUTREN 0.6 ML: 6.52 INJECTION, SUSPENSION INTRAVENOUS at 09:05

## 2024-01-13 RX ADMIN — CARVEDILOL 25 MG: 25 TABLET, FILM COATED ORAL at 17:37

## 2024-01-13 RX ADMIN — RIVAROXABAN 2.5 MG: 2.5 TABLET, FILM COATED ORAL at 20:46

## 2024-01-13 RX ADMIN — HYDROCODONE BITARTRATE AND ACETAMINOPHEN 1 TABLET: 5; 325 TABLET ORAL at 20:36

## 2024-01-13 ASSESSMENT — PAIN SCALES - GENERAL
PAINLEVEL_OUTOF10: 0
PAINLEVEL_OUTOF10: 6
PAINLEVEL_OUTOF10: 0

## 2024-01-13 ASSESSMENT — PAIN DESCRIPTION - ORIENTATION: ORIENTATION: MID;LOWER

## 2024-01-13 ASSESSMENT — PAIN DESCRIPTION - DESCRIPTORS: DESCRIPTORS: ACHING;STABBING

## 2024-01-13 ASSESSMENT — PAIN DESCRIPTION - LOCATION: LOCATION: BACK

## 2024-01-13 NOTE — PROGRESS NOTES
TRANSFER - OUT REPORT:    Verbal report given to LAINA HYATT  on Gume Guillen Jr.  being transferred to [facility] for routine progression of patient care       Report consisted of patient's Situation, Background, Assessment and   Recommendations(SBAR).     Information from the following report(s) Nurse Handoff Report, Index, ED Encounter Summary, ED SBAR, Adult Overview, Intake/Output, MAR, Recent Results, Med Rec Status, Cardiac Rhythm NSR, Alarm Parameters, Quality Measures, and Neuro Assessment was reviewed with the receiving nurse.           Lines:   Peripheral IV 01/12/24 Distal;Right;Anterior Cephalic (Active)   Site Assessment Clean, dry & intact 01/13/24 0700   Line Status Capped;Flushed 01/13/24 0700   Line Care Cap changed;Ports disinfected 01/13/24 0700   Phlebitis Assessment No symptoms 01/13/24 0700   Infiltration Assessment 0 01/13/24 0700   Alcohol Cap Used Yes 01/13/24 0700   Dressing Status Clean, dry & intact 01/13/24 0700   Dressing Type Transparent 01/13/24 0700        Opportunity for questions and clarification was provided.      Patient transported with:  Monitor and Registered Nurse

## 2024-01-13 NOTE — PROGRESS NOTES
[facility] CARDIOLOGY PROGRESS NOTE           1/13/2024 8:44 AM    Admit Date: 1/12/2024      Subjective:   Patient is resting comfortably this morning.  Hemodynamics are markedly improved.  He denies chest pain.    ROS:  Cardiovascular:  As noted above    Objective:      Vitals:    01/13/24 0730 01/13/24 0745 01/13/24 0800 01/13/24 0817   BP: 118/78 125/84 118/81 110/75   Pulse: 86 65 66 87   Resp:  27 10    Temp:       TempSrc:       SpO2: 98% 100% 99%    Weight:       Height:           Physical Exam:  General-No Acute Distress  Neck- supple, no JVD  CV- regular rate and rhythm no MRG  Lung- clear bilaterally  Abd- soft, nontender, nondistended  Ext- no edema bilaterally.  Skin- warm and dry    Data Review:   Recent Labs     01/13/24  0331      K 3.4*   MG 2.3   BUN 12   WBC 13.6*   HGB 15.3   HCT 46.7      CHOL 112   HDL 38*       Assessment/Plan:     Principal Problem:    STEMI (ST elevation myocardial infarction) (HCC)  Plan: Patient with probable distal embolization of clot due to severe ectasia in right coronary artery.  He is status post aspiration thrombectomy and stenting of a small terminal posterior descending branch.  Patient reports being off of aspirin at time of event.  Recommend aspirin, clopidogrel and low-dose Xarelto at this time.  Patient has severe diffuse pattern ectasia in all 3 epicardial vessels with small distal vessel disease.  Active Problems:    Unstable angina (HCC)  Plan: See above    CAD in native artery  Plan: See above    Hyperlipidemia  Plan: Continue atorvastatin 80 mg daily.    Hypertension  Plan: Continue carvedilol 25 mg twice daily, losartan 100 mg daily    Type 2 diabetes mellitus without complication, without long-term current use of insulin (HCC)  Plan: Continue Jardiance 25 mg daily    GERD (gastroesophageal reflux disease)  Plan: Chronic and stable        VIGNESH ADORNO MD  1/13/2024 8:44 AM

## 2024-01-13 NOTE — PROGRESS NOTES
Pt to room and monitor. Family at bedside. Pt c/o back pain due to \"in bed all day\". States feeling great and wants to go home as soon as possible.    TRANSFER - IN REPORT:    Verbal report received from Javier DURANT RN on Gume Guillen Jr. being received from ICU for routine progression of patient care      Report consisted of patient’s Situation, Background, Assessment and Recommendations(SBAR).     Information from the following report(s) SBAR, Kardex, Procedure Summary, MAR, Recent Results, and Cardiac Rhythm NSR  was reviewed with the receiving nurse.    Opportunity for questions and clarification was provided.      Assessment completed upon patient’s arrival to unit and care assumed.

## 2024-01-13 NOTE — PROGRESS NOTES
2005: Pt's Sister Marla, called security code obtained, As soon as I answered the phone. Marla started off the conversation with an increased tone, She complained about the patient receiving meds for active nausea before getting a wet washcloth (which was asked for as the medication for nausea was in hand), The patient was told that the washcloth would be retrieved as soon as the meds were given. The sister Marla called to express how rude that was. Once I was able to speak I tried to explain to her what the plan of care was   and she began yelling and stated that she had her aunt the phone (with the yelling over the phone this statement presented as a tatic of intimidation. I again tried to explain the care to which Marla stated that \"Im a [facility] Nurse\" . The conversation, Witnessed By Jorge HYATT and Divya HYATT

## 2024-01-14 VITALS
BODY MASS INDEX: 28.1 KG/M2 | SYSTOLIC BLOOD PRESSURE: 130 MMHG | WEIGHT: 226 LBS | TEMPERATURE: 99 F | RESPIRATION RATE: 21 BRPM | DIASTOLIC BLOOD PRESSURE: 92 MMHG | HEIGHT: 75 IN | OXYGEN SATURATION: 95 % | HEART RATE: 78 BPM

## 2024-01-14 LAB
ANION GAP SERPL CALC-SCNC: 4 MMOL/L (ref 2–11)
BUN SERPL-MCNC: 16 MG/DL (ref 6–23)
CALCIUM SERPL-MCNC: 8.3 MG/DL (ref 8.3–10.4)
CHLORIDE SERPL-SCNC: 110 MMOL/L (ref 103–113)
CO2 SERPL-SCNC: 22 MMOL/L (ref 21–32)
CREAT SERPL-MCNC: 1 MG/DL (ref 0.8–1.5)
GLUCOSE SERPL-MCNC: 95 MG/DL (ref 65–100)
MAGNESIUM SERPL-MCNC: 2.5 MG/DL (ref 1.8–2.4)
POTASSIUM SERPL-SCNC: 3.6 MMOL/L (ref 3.5–5.1)
SODIUM SERPL-SCNC: 136 MMOL/L (ref 136–146)

## 2024-01-14 PROCEDURE — 80048 BASIC METABOLIC PNL TOTAL CA: CPT

## 2024-01-14 PROCEDURE — 36415 COLL VENOUS BLD VENIPUNCTURE: CPT

## 2024-01-14 PROCEDURE — 6370000000 HC RX 637 (ALT 250 FOR IP): Performed by: INTERNAL MEDICINE

## 2024-01-14 PROCEDURE — 6370000000 HC RX 637 (ALT 250 FOR IP): Performed by: PHYSICIAN ASSISTANT

## 2024-01-14 PROCEDURE — 99238 HOSP IP/OBS DSCHRG MGMT 30/<: CPT | Performed by: INTERNAL MEDICINE

## 2024-01-14 PROCEDURE — 83735 ASSAY OF MAGNESIUM: CPT

## 2024-01-14 RX ORDER — AMLODIPINE BESYLATE 5 MG/1
5 TABLET ORAL DAILY
Qty: 30 TABLET | Refills: 3 | Status: SHIPPED | OUTPATIENT
Start: 2024-01-14

## 2024-01-14 RX ORDER — AMLODIPINE BESYLATE 5 MG/1
5 TABLET ORAL DAILY
Status: DISCONTINUED | OUTPATIENT
Start: 2024-01-14 | End: 2024-01-14 | Stop reason: HOSPADM

## 2024-01-14 RX ORDER — ASPIRIN 81 MG/1
81 TABLET ORAL DAILY
Qty: 30 TABLET | Refills: 3 | Status: SHIPPED | OUTPATIENT
Start: 2024-01-14

## 2024-01-14 RX ORDER — POTASSIUM CHLORIDE 20 MEQ/1
40 TABLET, EXTENDED RELEASE ORAL ONCE
Status: COMPLETED | OUTPATIENT
Start: 2024-01-14 | End: 2024-01-14

## 2024-01-14 RX ORDER — NITROGLYCERIN 0.4 MG/1
0.4 TABLET SUBLINGUAL EVERY 5 MIN PRN
Qty: 25 TABLET | Refills: 3 | Status: SHIPPED | OUTPATIENT
Start: 2024-01-14

## 2024-01-14 RX ORDER — LIDOCAINE 4 G/G
1 PATCH TOPICAL DAILY PRN
Qty: 30 PATCH | Refills: 0 | Status: SHIPPED | OUTPATIENT
Start: 2024-01-14

## 2024-01-14 RX ORDER — LIDOCAINE 4 G/G
1 PATCH TOPICAL DAILY PRN
Status: DISCONTINUED | OUTPATIENT
Start: 2024-01-14 | End: 2024-01-14 | Stop reason: HOSPADM

## 2024-01-14 RX ADMIN — CARVEDILOL 25 MG: 25 TABLET, FILM COATED ORAL at 09:22

## 2024-01-14 RX ADMIN — LOSARTAN POTASSIUM 100 MG: 50 TABLET, FILM COATED ORAL at 09:21

## 2024-01-14 RX ADMIN — ASPIRIN 81 MG: 81 TABLET, COATED ORAL at 09:22

## 2024-01-14 RX ADMIN — EMPAGLIFLOZIN 25 MG: 10 TABLET, FILM COATED ORAL at 09:20

## 2024-01-14 RX ADMIN — CLOPIDOGREL BISULFATE 75 MG: 75 TABLET ORAL at 09:22

## 2024-01-14 RX ADMIN — RIVAROXABAN 2.5 MG: 2.5 TABLET, FILM COATED ORAL at 09:22

## 2024-01-14 RX ADMIN — AMLODIPINE BESYLATE 5 MG: 5 TABLET ORAL at 09:22

## 2024-01-14 RX ADMIN — POTASSIUM CHLORIDE 40 MEQ: 1500 TABLET, EXTENDED RELEASE ORAL at 09:21

## 2024-01-14 ASSESSMENT — PAIN SCALES - GENERAL: PAINLEVEL_OUTOF10: 0

## 2024-01-14 NOTE — PROGRESS NOTES
Discharge reviewed with Pt and Family. Questions addressed at time of discharge. Telemetry and IV site removed.

## 2024-01-14 NOTE — DISCHARGE INSTRUCTIONS
for the first couple of days. Be careful using your hand to get into and out of a chair or bed.     Carry your stent identification card with you at all times.     If your doctor recommends it, get more exercise. Walking is a good choice. Bit by bit, increase the amount you walk every day. Try for at least 30 minutes on most days of the week.     If you haven't been set up with a cardiac rehab program, talk to your doctor about whether rehab is right for you. Cardiac rehab includes supervised exercise. It also includes help with diet and lifestyle changes and emotional support.   Diet    Drink plenty of fluids to help your body flush out the dye. If you have kidney, heart, or liver disease and have to limit fluids, talk with your doctor before you increase the amount of fluids you drink.     Keep eating a heart-healthy diet that has lots of fruits, vegetables, and whole grains. If you have not been eating this way, talk to your doctor. You also may want to talk to a dietitian. This expert can help you to learn about healthy foods and plan meals.   Medicines    Your doctor will tell you if and when you can restart your medicines. Your doctor will also give you instructions about taking any new medicines.     If you stopped taking aspirin or some other blood thinner, your doctor will tell you when to start taking it again.     You will take medicine that prevents blood clots. You may take aspirin plus another antiplatelet. It is very important that you take these medicines exactly as directed. These medicines help keep the coronary artery open and reduce your risk of a heart attack.     Call your doctor if you think you are having a problem with your medicine.   Care of the catheter site    For 1 or 2 days, keep a bandage over the spot where the catheter was inserted. The bandage probably will fall off in this time.     Put ice or a cold pack on the area for 10 to 20 minutes at a time to help with soreness or

## 2024-01-14 NOTE — PROGRESS NOTES
[facility] CARDIOLOGY PROGRESS NOTE           1/14/2024 8:06 AM    Admit Date: 1/12/2024      Subjective:   Patient is resting comfortably this morning.  Hemodynamics are markedly improved.  He denies chest pain.    ROS:  Cardiovascular:  As noted above    Objective:      Vitals:    01/13/24 2036 01/14/24 0012 01/14/24 0433 01/14/24 0734   BP: 121/83 112/80 (!) 132/90 (!) 132/90   Pulse: 70 73 72 73   Resp: 19 19 21    Temp: 98 °F (36.7 °C) 97.7 °F (36.5 °C) 98 °F (36.7 °C) 98.1 °F (36.7 °C)   TempSrc: Temporal Temporal Temporal Oral   SpO2: 98% 98% 98% 96%   Weight:       Height:           Physical Exam:  General-No Acute Distress  Neck- supple, no JVD  CV- regular rate and rhythm no MRG  Lung- clear bilaterally  Abd- soft, nontender, nondistended  Ext- no edema bilaterally.  Skin- warm and dry    Data Review:   Recent Labs     01/13/24  0331 01/14/24  0309    136   K 3.4* 3.6   MG 2.3 2.5*   BUN 12 16   WBC 13.6*  --    HGB 15.3  --    HCT 46.7  --      --    CHOL 112  --    HDL 38*  --          Assessment/Plan:     Principal Problem:    STEMI (ST elevation myocardial infarction) (HCC)  Plan: Patient with probable distal embolization of clot due to severe ectasia in right coronary artery.  He is status post aspiration thrombectomy and stenting of a small terminal posterior descending branch.  Patient reports being off of aspirin at time of event.  Recommend aspirin, clopidogrel and low-dose Xarelto at this time.  Patient has severe diffuse pattern ectasia in all 3 epicardial vessels with small distal vessel disease.  Active Problems:    Unstable angina (HCC)  Plan: See above    CAD in native artery  Plan: See above    Hyperlipidemia  Plan: Continue atorvastatin 80 mg daily.    Hypertension  Plan: Continue carvedilol 25 mg twice daily, losartan 100 mg daily - added amlodipine 5mg daily.    Type 2 diabetes mellitus without complication, without long-term current use of insulin

## 2024-01-14 NOTE — ICUWATCH
RRT Clinical Rounding Nurse Update    Vitals:    01/13/24 1737 01/13/24 1746 01/13/24 2036 01/14/24 0012   BP: (!) 130/90 134/87 121/83 112/80   Pulse: 74 71 70 73   Resp:  18 19 19   Temp:  98.2 °F (36.8 °C) 98 °F (36.7 °C) 97.7 °F (36.5 °C)   TempSrc:  Oral Temporal Temporal   SpO2:  97% 98% 98%   Weight:  102.5 kg (226 lb)     Height:  1.905 m (6' 3\")          DETERIORATION INDEX SCORE: 20    ASSESSMENT:  Previous outreach assessment was reviewed. There have been no significant changes since previous assessment.    PLAN:  Will follow per RRT Clinical Rounding Program protocol.    Zeferino Voss RN  [facility]: [phone]  [facility]: [phone]

## 2024-01-14 NOTE — PLAN OF CARE
Problem: Chronic Conditions and Co-morbidities  Goal: Patient's chronic conditions and co-morbidity symptoms are monitored and maintained or improved  Outcome: Adequate for Discharge     Problem: Discharge Planning  Goal: Discharge to home or other facility with appropriate resources  Outcome: Adequate for Discharge     Problem: Safety - Adult  Goal: Free from fall injury  Outcome: Adequate for Discharge

## 2024-01-14 NOTE — ICUWATCH
RRT Clinical Rounding Nurse Progress Report      SUBJECTIVE: Patient assessed secondary to transfer from critical care.      Vitals:    01/13/24 1651 01/13/24 1737 01/13/24 1746 01/13/24 2036   BP:  (!) 130/90 134/87 121/83   Pulse: 79 74 71 70   Resp: 18  18 19   Temp:   98.2 °F (36.8 °C) 98 °F (36.7 °C)   TempSrc:   Oral Temporal   SpO2: 97%  97% 98%   Weight:   102.5 kg (226 lb)    Height:   1.905 m (6' 3\")         DETERIORATION INDEX SCORE: 21    ASSESSMENT: Pt resting in bed, alert and oriented x4. Unlabored breathing on RA. Denies chest pain, dizziness or lightheadedness. R radial site is slightly tender, pulse +2, absence of pain, numbness or paresthesia. Cap refill < 3 sec. VSS. No complaints at this time, wishes to go home tomorrow.    Pertinent lab work, vital signs and progress notes from today have been reviewed.    PLAN:  Will follow per RRT Clinical Rounding Program protocol.    Zeferino Voss RN  [facility]: [phone]  [facility]: [phone]

## 2024-01-14 NOTE — CARE COORDINATION
Pt is for discharge home today with family and no needs/supportive care orders recieved for CM at this time.  Pt is to follow up with [facility].         01/14/24 0955   Service Assessment   Patient Orientation Alert and Oriented   Cognition Alert   History Provided By Medical Record;Patient   Primary Caregiver Self   Support Systems Family Members   Patient's Healthcare Decision Maker is: Legal Next of Kin   PCP Verified by CM Yes   Last Visit to PCP Within last 3 months  (1/8/2024)   Prior Functional Level Independent in ADLs/IADLs   Current Functional Level Independent in ADLs/IADLs   Can patient return to prior living arrangement Yes   Ability to make needs known: Good   Family able to assist with home care needs: Yes   Would you like for me to discuss the discharge plan with any other family members/significant others, and if so, who? No   Financial Resources Medicare   Community Resources None   CM/SW Referral Other (see comment)  (none)   Social/Functional History   Lives With Family   Type of Home House   ADL Assistance Independent   Ambulation Assistance Independent   Occupation Retired   Discharge Planning   Type of Residence House   Living Arrangements Family Members   Current Services Prior To Admission None   Potential Assistance Needed N/A   DME Ordered? No   Potential Assistance Purchasing Medications No   Type of Home Care Services None   Patient expects to be discharged to: House   Services At/After Discharge   Transition of Care Consult (CM Consult) Discharge Planning   Services At/After Discharge Outpatient  (referal to  Out Pt Cardiac Rehab)   [place] Resource Information Provided? No   Mode of Transport at Discharge Other (see comment)  (famiuly)   Confirm Follow Up Transport Self   Condition of Participation: Discharge Planning   The Plan for Transition of Care is related to the following treatment goals: Pt will return home at his functional baseline.   The Patient

## 2024-01-14 NOTE — DISCHARGE SUMMARY
long-term current use of insulin (HCC)      clopidogrel (PLAVIX) 75 MG tablet Take 1 tablet by mouth daily  Qty: 90 tablet, Refills: 3      losartan (COZAAR) 100 MG tablet TAKE 1 TABLET BY MOUTH EVERY DAY  Qty: 90 tablet, Refills: 3           STOP taking these medications       furosemide (LASIX) 80 MG tablet Comments:   Reason for Stopping:

## 2024-01-15 ENCOUNTER — TELEPHONE (OUTPATIENT)
Age: 54
End: 2024-01-15

## 2024-01-15 ENCOUNTER — OFFICE VISIT (OUTPATIENT)
Dept: INTERNAL MEDICINE CLINIC | Facility: CLINIC | Age: 54
End: 2024-01-15
Payer: MEDICARE

## 2024-01-15 VITALS
HEIGHT: 75 IN | TEMPERATURE: 97 F | DIASTOLIC BLOOD PRESSURE: 89 MMHG | OXYGEN SATURATION: 97 % | WEIGHT: 227 LBS | BODY MASS INDEX: 28.23 KG/M2 | SYSTOLIC BLOOD PRESSURE: 118 MMHG | HEART RATE: 80 BPM

## 2024-01-15 DIAGNOSIS — E11.9 TYPE 2 DIABETES MELLITUS WITHOUT COMPLICATION, WITHOUT LONG-TERM CURRENT USE OF INSULIN (HCC): ICD-10-CM

## 2024-01-15 DIAGNOSIS — I25.10 ASHD (ARTERIOSCLEROTIC HEART DISEASE): Primary | ICD-10-CM

## 2024-01-15 DIAGNOSIS — I21.19 ST ELEVATION MYOCARDIAL INFARCTION (STEMI) INVOLVING OTHER CORONARY ARTERY OF INFERIOR WALL (HCC): ICD-10-CM

## 2024-01-15 DIAGNOSIS — I25.10 CAD IN NATIVE ARTERY: ICD-10-CM

## 2024-01-15 PROCEDURE — 99214 OFFICE O/P EST MOD 30 MIN: CPT | Performed by: INTERNAL MEDICINE

## 2024-01-15 PROCEDURE — 3044F HG A1C LEVEL LT 7.0%: CPT | Performed by: INTERNAL MEDICINE

## 2024-01-15 PROCEDURE — 3074F SYST BP LT 130 MM HG: CPT | Performed by: INTERNAL MEDICINE

## 2024-01-15 PROCEDURE — 3079F DIAST BP 80-89 MM HG: CPT | Performed by: INTERNAL MEDICINE

## 2024-01-15 ASSESSMENT — ENCOUNTER SYMPTOMS
BACK PAIN: 1
SHORTNESS OF BREATH: 0
ABDOMINAL PAIN: 0

## 2024-01-15 NOTE — PROGRESS NOTES
[facility] CARDIOLOGY Follow Up                 Reason for Visit: Stable ischemic heart disease    Subjective:     Patient is a 53 y.o. male with a PMH of CAD status post PCI, ICM, hypertension, and hyperlipidemia who presents for follow-up.  The patient was last seen in October 2023.  He was hospitalized in January 2024 with a STEMI.  He was noted to have a 50 to 70% stenosis and \"severe small vessel apical disease\" of the LAD.  The patient was noted to have a 50% ostial OM stenosis.  His stents in the proximal and mid RCA were noted to be patent.  The patient was noted to have a \"large acute/organized thrombus\" of the right PDA.  He underwent 3 CLARA at that location.  He was discharged on aspirin, Plavix, and low-dose Xarelto.  He had a TTE in January 2024 that was noted to demonstrate an EF of 45 to 50% with RWMA's.  His ascending aorta was noted to be 3.7 cm.  The patient denies angina and dyspnea.  He denies LE edema.      Past Medical History:   Diagnosis Date    Arthritis     Benign essential hypertension     CAD (coronary artery disease)     MI in 12/07, 6/2017    Chronic pain     Dilated cardiomyopathy (HCC)     Gastrointestinal hemorrhage with hematemesis 6/20/2017    GERD (gastroesophageal reflux disease)     Heart failure (HCC)     Hyperlipidemia LDL goal <70     Psychiatric disorder     depression and anxiety    Renal insufficiency       Past Surgical History:   Procedure Laterality Date    CARDIAC PROCEDURE N/A 1/12/2024    Left heart cath / coronary angiography performed by Dakota Leyva MD at [facility] CARDIAC CATH LAB    CARDIAC PROCEDURE N/A 1/12/2024    Percutaneous coronary intervention performed by Dakota Leyva MD at [facility] CARDIAC CATH LAB    CORONARY ANGIOPLASTY WITH STENT PLACEMENT  06/2017    RCA    LEFT HEART CATH,PERCUTANEOUS  06/19/2017    STEMI & PCI    ORTHOPEDIC SURGERY      right femur ORIF surg    WISDOM TOOTH EXTRACTION        Family History   Problem Relation Age of Onset    Heart

## 2024-01-15 NOTE — PROGRESS NOTES
Gastrointestinal:  Negative for abdominal pain.   Musculoskeletal:  Positive for back pain.   Skin:  Negative for rash.   Neurological:  Negative for speech difficulty.   Psychiatric/Behavioral:  Negative for dysphoric mood.          Vital Signs  /89 (Site: Left Upper Arm, Position: Sitting, Cuff Size: Large Adult)   Pulse 80   Temp 97 °F (36.1 °C) (Temporal)   Ht 1.905 m (6' 3\")   Wt 103 kg (227 lb)   SpO2 97%   BMI 28.37 kg/m²   Body mass index is 28.37 kg/m².    Physical Exam  Vitals reviewed.   Constitutional:       General: He is not in acute distress.     Appearance: Normal appearance. He is not ill-appearing.   HENT:      Head: Normocephalic and atraumatic.   Eyes:      General: No scleral icterus.     Conjunctiva/sclera: Conjunctivae normal.   Neck:      Vascular: No carotid bruit.   Cardiovascular:      Rate and Rhythm: Normal rate and regular rhythm.      Heart sounds: Normal heart sounds. No murmur heard.  Pulmonary:      Effort: Pulmonary effort is normal.      Breath sounds: Normal breath sounds.   Musculoskeletal:         General: No swelling.   Skin:     Coloration: Skin is not jaundiced.      Findings: No rash.   Neurological:      General: No focal deficit present.      Mental Status: He is alert. Mental status is at baseline.      Cranial Nerves: No cranial nerve deficit.      Motor: No weakness.      Gait: Gait normal.   Psychiatric:         Mood and Affect: Mood normal.         Behavior: Behavior normal.         Thought Content: Thought content normal.         Judgment: Judgment normal.           Assessment/Plan:  Gume was seen today for follow-up.    Diagnoses and all orders for this visit:    ASHD (arteriosclerotic heart disease)    CAD in native artery    ST elevation myocardial infarction (STEMI) involving other coronary artery of inferior wall (HCC)    Type 2 diabetes mellitus without complication, without long-term current use of insulin (HCC)    Continue current medication.

## 2024-01-15 NOTE — TELEPHONE ENCOUNTER
Care Transitions Initial Follow Up Call    Call within 2 business days of discharge: Yes     Patient: Gume Guillen Jr. Patient : 1970 MRN: [id number]    [unfilled]    RARS: Readmission Risk Score: 6.1       Spoke with: pt    Discharge department/facility: [facility]  ADM 24  Discharge 24   STEMI    Non-face-to-face services provided:  Pt reports feeling good. Denies CP.  Denies bleeding, oozing, drainage, increased redness or hot to touch at cath site. Discussed following low cholesterol, low sat. Fat, low salt diet.  Pt states that he does not eat any added salt. Taking medications as prescribed although pt states that he has not picked up the Xarelot yet from pharmacy.   Reports that his pharmacy should have it in today.  I stressed the importance of taking Xarelto and asked pt to check back with his pharmacy today. Asked pt to call our office if his pharmacy does not have it and would  eScribe to another pharmacy that does have it in stock OR can give pt samples if available.  Pt v/u.     Follow Up  Future Appointments   Date Time Provider Department Center   2024  1:00 PM Yayo Lorenzo MD [facility] AMB   2024  9:30 AM Yayo Lorenzo MD [facility] AMB   2024  8:45 AM Simone Velásquez MD [facility] AMB       Jacqui De La O LPN

## 2024-01-17 ENCOUNTER — OFFICE VISIT (OUTPATIENT)
Age: 54
End: 2024-01-17

## 2024-01-17 VITALS
BODY MASS INDEX: 29.09 KG/M2 | DIASTOLIC BLOOD PRESSURE: 80 MMHG | WEIGHT: 234 LBS | HEART RATE: 96 BPM | HEIGHT: 75 IN | SYSTOLIC BLOOD PRESSURE: 118 MMHG

## 2024-01-17 DIAGNOSIS — I25.5 ISCHEMIC CARDIOMYOPATHY: ICD-10-CM

## 2024-01-17 DIAGNOSIS — I25.10 CAD IN NATIVE ARTERY: ICD-10-CM

## 2024-01-17 DIAGNOSIS — I10 HYPERTENSION, UNSPECIFIED TYPE: ICD-10-CM

## 2024-01-17 DIAGNOSIS — I71.20 THORACIC AORTIC ANEURYSM WITHOUT RUPTURE, UNSPECIFIED PART (HCC): Primary | ICD-10-CM

## 2024-01-17 DIAGNOSIS — E66.3 OVERWEIGHT (BMI 25.0-29.9): ICD-10-CM

## 2024-01-17 DIAGNOSIS — E78.5 HYPERLIPIDEMIA, UNSPECIFIED HYPERLIPIDEMIA TYPE: ICD-10-CM

## 2024-01-17 RX ORDER — EZETIMIBE 10 MG/1
10 TABLET ORAL DAILY
Qty: 90 TABLET | Refills: 3 | Status: SHIPPED | OUTPATIENT
Start: 2024-01-17

## 2024-01-20 ENCOUNTER — APPOINTMENT (OUTPATIENT)
Dept: GENERAL RADIOLOGY | Age: 54
End: 2024-01-20
Payer: MEDICARE

## 2024-01-20 ENCOUNTER — HOSPITAL ENCOUNTER (EMERGENCY)
Age: 54
Discharge: HOME | End: 2024-01-20
Attending: EMERGENCY MEDICINE
Payer: MEDICARE

## 2024-01-20 VITALS
WEIGHT: 230 LBS | BODY MASS INDEX: 28.6 KG/M2 | SYSTOLIC BLOOD PRESSURE: 140 MMHG | HEIGHT: 75 IN | HEART RATE: 73 BPM | RESPIRATION RATE: 17 BRPM | DIASTOLIC BLOOD PRESSURE: 93 MMHG | OXYGEN SATURATION: 99 % | TEMPERATURE: 98.3 F

## 2024-01-20 DIAGNOSIS — R07.9 CHEST PAIN, UNSPECIFIED TYPE: Primary | ICD-10-CM

## 2024-01-20 LAB
ALBUMIN SERPL-MCNC: 3.7 G/DL (ref 3.5–5)
ALBUMIN/GLOB SERPL: 1.2 (ref 0.4–1.6)
ALP SERPL-CCNC: 90 U/L (ref 50–136)
ALT SERPL-CCNC: 17 U/L (ref 12–65)
ANION GAP SERPL CALC-SCNC: 4 MMOL/L (ref 2–11)
AST SERPL-CCNC: 10 U/L (ref 15–37)
BASOPHILS # BLD: 0.1 K/UL (ref 0–0.2)
BASOPHILS NFR BLD: 1 % (ref 0–2)
BILIRUB SERPL-MCNC: 0.5 MG/DL (ref 0.2–1.1)
BUN SERPL-MCNC: 12 MG/DL (ref 6–23)
CALCIUM SERPL-MCNC: 8.4 MG/DL (ref 8.3–10.4)
CHLORIDE SERPL-SCNC: 114 MMOL/L (ref 103–113)
CO2 SERPL-SCNC: 23 MMOL/L (ref 21–32)
CREAT SERPL-MCNC: 1.1 MG/DL (ref 0.8–1.5)
DIFFERENTIAL METHOD BLD: ABNORMAL
EKG ATRIAL RATE: 65 BPM
EKG DIAGNOSIS: NORMAL
EKG P AXIS: 57 DEGREES
EKG P-R INTERVAL: 184 MS
EKG Q-T INTERVAL: 386 MS
EKG QRS DURATION: 106 MS
EKG QTC CALCULATION (BAZETT): 399 MS
EKG R AXIS: -33 DEGREES
EKG T AXIS: -40 DEGREES
EKG VENTRICULAR RATE: 64 BPM
EOSINOPHIL # BLD: 0.4 K/UL (ref 0–0.8)
EOSINOPHIL NFR BLD: 6 % (ref 0.5–7.8)
ERYTHROCYTE [DISTWIDTH] IN BLOOD BY AUTOMATED COUNT: 13.4 % (ref 11.9–14.6)
GLOBULIN SER CALC-MCNC: 3.2 G/DL (ref 2.8–4.5)
GLUCOSE SERPL-MCNC: 151 MG/DL (ref 65–100)
HCT VFR BLD AUTO: 46.1 % (ref 41.1–50.3)
HGB BLD-MCNC: 14.8 G/DL (ref 13.6–17.2)
IMM GRANULOCYTES # BLD AUTO: 0 K/UL (ref 0–0.5)
IMM GRANULOCYTES NFR BLD AUTO: 0 % (ref 0–5)
LYMPHOCYTES # BLD: 1.8 K/UL (ref 0.5–4.6)
LYMPHOCYTES NFR BLD: 26 % (ref 13–44)
MCH RBC QN AUTO: 30 PG (ref 26.1–32.9)
MCHC RBC AUTO-ENTMCNC: 32.1 G/DL (ref 31.4–35)
MCV RBC AUTO: 93.3 FL (ref 82–102)
MONOCYTES # BLD: 0.5 K/UL (ref 0.1–1.3)
MONOCYTES NFR BLD: 7 % (ref 4–12)
NEUTS SEG # BLD: 4.1 K/UL (ref 1.7–8.2)
NEUTS SEG NFR BLD: 60 % (ref 43–78)
NRBC # BLD: 0 K/UL (ref 0–0.2)
PLATELET # BLD AUTO: 205 K/UL (ref 150–450)
PMV BLD AUTO: 8.9 FL (ref 9.4–12.3)
POTASSIUM SERPL-SCNC: 3.6 MMOL/L (ref 3.5–5.1)
PROT SERPL-MCNC: 6.9 G/DL (ref 6.3–8.2)
RBC # BLD AUTO: 4.94 M/UL (ref 4.23–5.6)
SODIUM SERPL-SCNC: 141 MMOL/L (ref 136–146)
TROPONIN I SERPL HS-MCNC: 3514.6 PG/ML (ref 0–14)
TROPONIN I SERPL HS-MCNC: 3753.3 PG/ML (ref 0–14)
WBC # BLD AUTO: 6.8 K/UL (ref 4.3–11.1)

## 2024-01-20 PROCEDURE — 84484 ASSAY OF TROPONIN QUANT: CPT

## 2024-01-20 PROCEDURE — 71045 X-RAY EXAM CHEST 1 VIEW: CPT

## 2024-01-20 PROCEDURE — 93005 ELECTROCARDIOGRAM TRACING: CPT | Performed by: EMERGENCY MEDICINE

## 2024-01-20 PROCEDURE — 80053 COMPREHEN METABOLIC PANEL: CPT

## 2024-01-20 PROCEDURE — 6370000000 HC RX 637 (ALT 250 FOR IP): Performed by: EMERGENCY MEDICINE

## 2024-01-20 PROCEDURE — 99285 EMERGENCY DEPT VISIT HI MDM: CPT

## 2024-01-20 PROCEDURE — 85025 COMPLETE CBC W/AUTO DIFF WBC: CPT

## 2024-01-20 PROCEDURE — 93010 ELECTROCARDIOGRAM REPORT: CPT | Performed by: INTERNAL MEDICINE

## 2024-01-20 RX ADMIN — RIVAROXABAN 2.5 MG: 2.5 TABLET, FILM COATED ORAL at 09:32

## 2024-01-20 ASSESSMENT — ENCOUNTER SYMPTOMS
BACK PAIN: 0
RESPIRATORY NEGATIVE: 1
GASTROINTESTINAL NEGATIVE: 1

## 2024-01-20 ASSESSMENT — LIFESTYLE VARIABLES
HOW OFTEN DO YOU HAVE A DRINK CONTAINING ALCOHOL: NEVER
HOW MANY STANDARD DRINKS CONTAINING ALCOHOL DO YOU HAVE ON A TYPICAL DAY: PATIENT DOES NOT DRINK

## 2024-01-20 NOTE — ED PROVIDER NOTES
STEMI and had a heart cath with a stent placed.  Denies any leg swelling.  Denies any cough congestion fever or bodyaches.  Has been compliant with his Xarelto and Plavix and all of his blood pressure medication since discharge from the hospital.  He denies any hemoptysis.  His wound on his right wrist from the heart cath is normal-appearing.  No discharge.  It is healing.  He currently does not have any chest pain at this time.  He did have it when he arrived to the ER.  That was roughly 5 to 10 minutes prior to being brought back to the room  Review of Systems   Constitutional: Negative.  Negative for fever.   HENT: Negative.     Respiratory: Negative.     Cardiovascular:  Positive for chest pain. Negative for palpitations and leg swelling.   Gastrointestinal: Negative.    Genitourinary: Negative.    Musculoskeletal:  Negative for back pain.   Skin: Negative.    Neurological: Negative.        Physical Exam     Vitals signs and nursing note reviewed:  Vitals:    01/20/24 0933 01/20/24 0940 01/20/24 1020 01/20/24 1040   BP:  (!) 123/90 (!) 119/91 124/86   Pulse:  71 64 62   Resp:  16 16 14   Temp:       SpO2:  98% 99% 99%   Weight: 104.3 kg (230 lb)      Height: 1.905 m (6' 3\")         Physical Exam  Vitals and nursing note reviewed.   Constitutional:       General: He is not in acute distress.     Appearance: Normal appearance. He is not ill-appearing.   HENT:      Head: Normocephalic.      Mouth/Throat:      Mouth: Mucous membranes are moist.   Eyes:      Conjunctiva/sclera: Conjunctivae normal.   Cardiovascular:      Rate and Rhythm: Normal rate and regular rhythm.      Pulses: Normal pulses.      Heart sounds: Normal heart sounds. No murmur heard.  Pulmonary:      Effort: Pulmonary effort is normal. No respiratory distress.      Breath sounds: Normal breath sounds. No wheezing or rales.   Abdominal:      General: Abdomen is flat. Bowel sounds are normal. There is no distension.      Palpations: Abdomen is soft.

## 2024-01-20 NOTE — ED NOTES
Report received from EDMAR Gonzalez and care assumed at this time.      Kristen Ryan RN  01/20/24 0951

## 2024-01-20 NOTE — ED TRIAGE NOTES
Pt arrives to ER with c/o chest pain x 1 day. Pt was seen x 1 week ago and taken to the cath lab for stemi.

## 2024-01-20 NOTE — DISCHARGE INSTRUCTIONS
Please make sure you eat breakfast and take your daytime medication.  You have already received your daytime Xarelto dose today.  Please follow-up with a cardiologist.  As we discussed if you were to have more recurrent chest pain consider taking nitroglycerin.  If that does not help call 911 or return to the ER immediately.

## 2024-01-20 NOTE — ED NOTES
I have reviewed discharge instructions with the patient.  The patient verbalized understanding.    Patient left ED via Discharge Method: ambulatory to Home with self.    Opportunity for questions and clarification provided.       Patient given 0 scripts.         To continue your aftercare when you leave the hospital, you may receive an automated call from our care team to check in on how you are doing.  This is a free service and part of our promise to provide the best care and service to meet your aftercare needs.” If you have questions, or wish to unsubscribe from this service please call [phone].  Thank you for Choosing our [facility] Emergency Department.        Kristen Ryan, RN  01/20/24 6849

## 2024-01-22 ENCOUNTER — CARE COORDINATION (OUTPATIENT)
Dept: CARE COORDINATION | Facility: CLINIC | Age: 54
End: 2024-01-22

## 2024-01-26 ENCOUNTER — OFFICE VISIT (OUTPATIENT)
Dept: INTERNAL MEDICINE CLINIC | Facility: CLINIC | Age: 54
End: 2024-01-26
Payer: MEDICARE

## 2024-01-26 VITALS
OXYGEN SATURATION: 98 % | TEMPERATURE: 98.7 F | DIASTOLIC BLOOD PRESSURE: 85 MMHG | HEART RATE: 79 BPM | BODY MASS INDEX: 28.6 KG/M2 | SYSTOLIC BLOOD PRESSURE: 128 MMHG | WEIGHT: 230 LBS | HEIGHT: 75 IN

## 2024-01-26 DIAGNOSIS — S39.012A STRAIN OF LUMBAR REGION, INITIAL ENCOUNTER: Primary | ICD-10-CM

## 2024-01-26 PROCEDURE — 99213 OFFICE O/P EST LOW 20 MIN: CPT | Performed by: INTERNAL MEDICINE

## 2024-01-26 PROCEDURE — 3079F DIAST BP 80-89 MM HG: CPT | Performed by: INTERNAL MEDICINE

## 2024-01-26 PROCEDURE — 3074F SYST BP LT 130 MM HG: CPT | Performed by: INTERNAL MEDICINE

## 2024-01-26 RX ORDER — BACLOFEN 10 MG/1
10 TABLET ORAL 3 TIMES DAILY
Qty: 30 TABLET | Refills: 0 | Status: SHIPPED | OUTPATIENT
Start: 2024-01-26

## 2024-01-26 ASSESSMENT — ENCOUNTER SYMPTOMS: BACK PAIN: 1

## 2024-01-26 NOTE — PROGRESS NOTES
[facility]  Simone Velásquez M.D.  Internal Medicine  [address] [place] [address]  Office : [phone]  Fax : [phone]    Chief Complaint   Patient presents with    Back Pain     Pt reports low back pain since being hospitalized.       History of Present Illness:  Gume Guillen Jr. is a 53 y.o. male.  Back Pain  This is a new problem. The current episode started 1 to 4 weeks ago. The problem occurs constantly. The problem has been gradually improving since onset. The pain is present in the lumbar spine. The quality of the pain is described as aching. The pain does not radiate. The pain is moderate. The pain is The same all the time. The symptoms are aggravated by bending. Pertinent negatives include no fever, numbness, paresthesias or weakness. He has tried analgesics and heat for the symptoms. The treatment provided moderate relief.       Past Medical History:  Past Medical History:   Diagnosis Date    Arthritis     Benign essential hypertension     CAD (coronary artery disease)     MI in 12/07, 6/2017    Chronic pain     Dilated cardiomyopathy (HCC)     Gastrointestinal hemorrhage with hematemesis 6/20/2017    GERD (gastroesophageal reflux disease)     Heart failure (HCC)     Hyperlipidemia LDL goal <70     Psychiatric disorder     depression and anxiety    Renal insufficiency      Past Surgical History:  Past Surgical History:   Procedure Laterality Date    CARDIAC PROCEDURE N/A 1/12/2024    Left heart cath / coronary angiography performed by Dakota Leyva MD at [facility] CARDIAC CATH LAB    CARDIAC PROCEDURE N/A 1/12/2024    Percutaneous coronary intervention performed by Dakota Leyva MD at [facility] CARDIAC CATH LAB    CORONARY ANGIOPLASTY WITH STENT PLACEMENT  06/2017    RCA    LEFT HEART CATH,PERCUTANEOUS  06/19/2017    STEMI & PCI    ORTHOPEDIC SURGERY      right femur ORIF surg    WISDOM TOOTH EXTRACTION       Allergies:   Allergies   Allergen

## 2024-01-30 ENCOUNTER — TELEPHONE (OUTPATIENT)
Age: 54
End: 2024-01-30

## 2024-01-30 ENCOUNTER — HOSPITAL ENCOUNTER (OUTPATIENT)
Dept: CT IMAGING | Age: 54
Discharge: HOME | End: 2024-02-02
Attending: INTERNAL MEDICINE
Payer: MEDICARE

## 2024-01-30 DIAGNOSIS — R93.89 ABNORMAL CHEST CT: Primary | ICD-10-CM

## 2024-01-30 DIAGNOSIS — I71.20 THORACIC AORTIC ANEURYSM WITHOUT RUPTURE, UNSPECIFIED PART (HCC): ICD-10-CM

## 2024-01-30 PROCEDURE — 6360000004 HC RX CONTRAST MEDICATION: Performed by: INTERNAL MEDICINE

## 2024-01-30 PROCEDURE — 71275 CT ANGIOGRAPHY CHEST: CPT | Performed by: RADIOLOGY

## 2024-01-30 PROCEDURE — 71275 CT ANGIOGRAPHY CHEST: CPT

## 2024-01-30 RX ADMIN — IOPAMIDOL 75 ML: 755 INJECTION, SOLUTION INTRAVENOUS at 10:34

## 2024-01-30 NOTE — TELEPHONE ENCOUNTER
----- Message from Yayo Lorenzo MD sent at 1/30/2024  1:59 PM EST -----  Please let the patient know that the patient does not have evidence of a TAA.  However, he was noted to have incidental findings in his right lung.  I will have him see pulmonology to take the baton for further evaluation and surveillance of this finding.  I placed a pulmonology consult.

## 2024-02-01 ENCOUNTER — TELEPHONE (OUTPATIENT)
Dept: PULMONOLOGY | Age: 54
End: 2024-02-01

## 2024-02-07 ENCOUNTER — OFFICE VISIT (OUTPATIENT)
Dept: PULMONOLOGY | Age: 54
End: 2024-02-07
Payer: MEDICARE

## 2024-02-07 VITALS
BODY MASS INDEX: 28.63 KG/M2 | HEIGHT: 75 IN | SYSTOLIC BLOOD PRESSURE: 132 MMHG | OXYGEN SATURATION: 95 % | DIASTOLIC BLOOD PRESSURE: 80 MMHG | HEART RATE: 83 BPM | WEIGHT: 230.3 LBS

## 2024-02-07 DIAGNOSIS — I25.10 CORONARY ARTERY DISEASE INVOLVING NATIVE CORONARY ARTERY WITHOUT ANGINA PECTORIS, UNSPECIFIED WHETHER NATIVE OR TRANSPLANTED HEART: ICD-10-CM

## 2024-02-07 DIAGNOSIS — J98.4 RESTRICTIVE LUNG DISEASE: ICD-10-CM

## 2024-02-07 DIAGNOSIS — R91.8 PULMONARY NODULES: Primary | ICD-10-CM

## 2024-02-07 DIAGNOSIS — R29.818 SUSPECTED SLEEP APNEA: ICD-10-CM

## 2024-02-07 LAB
EXPIRATORY TIME: NORMAL
FEF 25-75% %PRED-PRE: NORMAL
FEF 25-75% PRED: NORMAL
FEF 25-75-PRE: NORMAL
FEV1 %PRED-PRE: 60 %
FEV1 PRED: 3.95 L
FEV1/FVC %PRED-PRE: 96 %
FEV1/FVC PRED: 80 %
FEV1/FVC: 76 %
FEV1: 2.37 L
FVC %PRED-PRE: 63 %
FVC PRED: 4.96 L
FVC: 3.11 L
PEF %PRED-PRE: NORMAL
PEF PRED: NORMAL
PEF-PRE: NORMAL

## 2024-02-07 PROCEDURE — 99204 OFFICE O/P NEW MOD 45 MIN: CPT | Performed by: INTERNAL MEDICINE

## 2024-02-07 PROCEDURE — 3079F DIAST BP 80-89 MM HG: CPT | Performed by: INTERNAL MEDICINE

## 2024-02-07 PROCEDURE — 94010 BREATHING CAPACITY TEST: CPT | Performed by: INTERNAL MEDICINE

## 2024-02-07 PROCEDURE — 3075F SYST BP GE 130 - 139MM HG: CPT | Performed by: INTERNAL MEDICINE

## 2024-02-07 ASSESSMENT — PULMONARY FUNCTION TESTS
FEV1_PREDICTED: 3.95
FEV1_PERCENT_PREDICTED_PRE: 60
FEV1/FVC: 76
FEV1/FVC_PERCENT_PREDICTED_PRE: 96
FVC: 3.11
FEV1/FVC_PREDICTED: 80
FVC_PERCENT_PREDICTED_PRE: 63
FVC_PREDICTED: 4.96
FEV1: 2.37

## 2024-02-07 NOTE — PROGRESS NOTES
of consciousness, seizures, or motor or sensory deficits.    OBJECTIVE:  Physical Exam:  Vitals:    02/07/24 1504   BP: 132/80   Pulse: 83   SpO2: 95%        GENERAL APPEARANCE:   The patient is normal weight and in no respiratory distress.     HEENT:   PERRL.  Conjunctivae unremarkable.   Nasal mucosa is without epistaxis, exudate, or polyps.  Gums and dentition are unremarkable.  There is no oropharyngeal narrowing.  TMs are clear.     NECK/LYMPHATIC:   Symmetrical with no elevation of jugular venous pulsation.  Trachea midline. No thyroid enlargement.  No cervical adenopathy.     LUNGS:   Normal respiratory effort with symmetrical lung expansion.   Breath sounds clear.     HEART:   There is a regular rate and rhythm.  No murmur, rub, or gallop.  There is no edema in the lower extremities.     ABDOMEN:   Soft and non-tender.  No hepatosplenomegaly.  Bowel sounds are normal.       SKIN:   There are no rashes, cyanosis, jaundice, or ecchymosis present.     EXTREMITIES:   The extremities are unremarkable without clubbing, cyanosis, joint inflammation, degenerative, or ischemic change.     MUSCULOSKELETAL:   There is no abnormal tone, muscle atrophy, or abnormal movement present.     NEURO:   The patient is alert and oriented to person, place, and time.  Memory appears intact and mood is normal.  No gross sensorimotor deficits are present.    Current Outpatient Medications   Medication Instructions    amLODIPine (NORVASC) 5 mg, Oral, DAILY    aspirin 81 mg, Oral, DAILY    atorvastatin (LIPITOR) 80 MG tablet TAKE 1 TABLET BY MOUTH DAILY    baclofen (LIORESAL) 10 mg, Oral, 3 TIMES DAILY    carvedilol (COREG) 25 MG tablet TAKE 1 TABLET BY MOUTH TWICE DAILY    clopidogrel (PLAVIX) 75 mg, Oral, DAILY    empagliflozin (JARDIANCE) 25 mg, Oral, DAILY    ezetimibe (ZETIA) 10 mg, Oral, DAILY    lidocaine 4 % external patch 1 patch, TransDERmal, DAILY PRN    losartan (COZAAR) 100 MG tablet TAKE 1 TABLET BY MOUTH EVERY DAY

## 2024-03-11 ENCOUNTER — HOSPITAL ENCOUNTER (OUTPATIENT)
Dept: SLEEP CENTER | Age: 54
Discharge: HOME | End: 2024-03-14
Payer: MEDICARE

## 2024-03-11 PROCEDURE — 95811 POLYSOM 6/>YRS CPAP 4/> PARM: CPT

## 2024-04-04 NOTE — PROGRESS NOTES
ttl pk-yrs)     Types: Cigarettes     Start date: 2002     Quit date: 2017     Years since quittin.7    Smokeless tobacco: Never   Substance Use Topics    Alcohol use: No      Allergies   Allergen Reactions    Abciximab Other (See Comments)     Thrombocytopenia    Lisinopril Swelling         ROS:  No obvious pertinent positives on review of systems except for what was outlined above.       Objective:       BP (!) 124/92   Pulse 72   Ht 1.905 m (6' 3\")   Wt 103.9 kg (229 lb)   BMI 28.62 kg/m²     BP Readings from Last 3 Encounters:   24 (!) 124/92   24 132/80   24 128/85       Wt Readings from Last 3 Encounters:   24 103.9 kg (229 lb)   24 104.5 kg (230 lb 4.8 oz)   24 104.3 kg (230 lb)       General/Constitutional:   Alert and oriented x 3, no acute distress  HEENT:   normocephalic, atraumatic, moist mucous membranes  Neck:   No JVD or carotid bruits bilaterally  Cardiovascular:   regular rate and rhythm, no rub/gallop appreciated  Pulmonary:   clear to auscultation bilaterally, no respiratory distress  Abdomen:   soft, non-tender, non-distended  Ext:   No sig LE edema bilaterally  Skin:  warm and dry, no obvious rashes seen  Neuro:   no obvious sensory or motor deficits  Psychiatric:   normal mood and affect    Data Review:   Lab Results   Component Value Date    CHOL 112 2024    CHOL 141 2024    CHOL 128 2023     Lab Results   Component Value Date    TRIG 64 2024    TRIG 237 (H) 2024    TRIG 69 2023     Lab Results   Component Value Date    HDL 38 (L) 2024    HDL 27 (L) 2024    HDL 34 (L) 2023     Lab Results   Component Value Date    LDLCALC 61.2 2024    LDLCALC 66.6 2024    LDLCALC 80.2 2023     Lab Results   Component Value Date    VLDL 13 2021     Lab Results   Component Value Date    CHOLHDLRATIO 2.9 2024    CHOLHDLRATIO 5.2 2024    CHOLHDLRATIO 3.8 2023

## 2024-04-05 ENCOUNTER — OFFICE VISIT (OUTPATIENT)
Age: 54
End: 2024-04-05
Payer: MEDICARE

## 2024-04-05 VITALS
SYSTOLIC BLOOD PRESSURE: 124 MMHG | DIASTOLIC BLOOD PRESSURE: 92 MMHG | BODY MASS INDEX: 28.47 KG/M2 | HEART RATE: 72 BPM | WEIGHT: 229 LBS | HEIGHT: 75 IN

## 2024-04-05 DIAGNOSIS — E78.5 HYPERLIPIDEMIA, UNSPECIFIED HYPERLIPIDEMIA TYPE: ICD-10-CM

## 2024-04-05 DIAGNOSIS — I10 HYPERTENSION, UNSPECIFIED TYPE: ICD-10-CM

## 2024-04-05 DIAGNOSIS — I25.10 CAD IN NATIVE ARTERY: Primary | ICD-10-CM

## 2024-04-05 PROCEDURE — 99214 OFFICE O/P EST MOD 30 MIN: CPT | Performed by: INTERNAL MEDICINE

## 2024-04-05 PROCEDURE — 3080F DIAST BP >= 90 MM HG: CPT | Performed by: INTERNAL MEDICINE

## 2024-04-05 PROCEDURE — 3074F SYST BP LT 130 MM HG: CPT | Performed by: INTERNAL MEDICINE

## 2024-04-10 DIAGNOSIS — E11.9 TYPE 2 DIABETES MELLITUS WITHOUT COMPLICATION, WITHOUT LONG-TERM CURRENT USE OF INSULIN (HCC): ICD-10-CM

## 2024-04-10 DIAGNOSIS — E78.49 OTHER HYPERLIPIDEMIA: ICD-10-CM

## 2024-04-10 RX ORDER — ATORVASTATIN CALCIUM 80 MG/1
TABLET, FILM COATED ORAL
Qty: 90 TABLET | Refills: 1 | OUTPATIENT
Start: 2024-04-10

## 2024-04-10 RX ORDER — SITAGLIPTIN AND METFORMIN HYDROCHLORIDE 1000; 100 MG/1; MG/1
1 TABLET, FILM COATED, EXTENDED RELEASE ORAL DAILY
Qty: 90 TABLET | Refills: 1 | OUTPATIENT
Start: 2024-04-10

## 2024-04-12 ENCOUNTER — TELEPHONE (OUTPATIENT)
Dept: SLEEP MEDICINE | Age: 54
End: 2024-04-12

## 2024-04-12 DIAGNOSIS — G47.33 OSA (OBSTRUCTIVE SLEEP APNEA): Primary | ICD-10-CM

## 2024-04-12 NOTE — TELEPHONE ENCOUNTER
Patient had recent sleep study showing mild sleep apnea. Cpap therapy is recommended per sleep interp.  Patient has agreed to start CPAP therapy. Order sent to [facility].     CAMILA NUNEZ RCP

## 2024-04-17 ENCOUNTER — TELEPHONE (OUTPATIENT)
Age: 54
End: 2024-04-17

## 2024-04-17 DIAGNOSIS — I20.0 UNSTABLE ANGINA (HCC): Primary | ICD-10-CM

## 2024-04-17 RX ORDER — NITROGLYCERIN 0.4 MG/1
0.4 TABLET SUBLINGUAL EVERY 5 MIN PRN
Qty: 25 TABLET | Refills: 3 | Status: SHIPPED | OUTPATIENT
Start: 2024-04-17

## 2024-04-17 NOTE — TELEPHONE ENCOUNTER
Pt lost their nitro glycerine and is wondering if we will refill it or if they need to call their PCP

## 2024-04-17 NOTE — TELEPHONE ENCOUNTER
Requested Prescriptions     Signed Prescriptions Disp Refills    nitroGLYCERIN (NITROSTAT) 0.4 MG SL tablet 25 tablet 3     Sig: Place 1 tablet under the tongue every 5 minutes as needed for Chest pain up to max of 3 total doses. If no relief after 1 dose, call 911.     Authorizing Provider: PHONG URBINA     Ordering User: SRUTHI HOLCOMB

## 2024-05-08 ENCOUNTER — TELEPHONE (OUTPATIENT)
Age: 54
End: 2024-05-08

## 2024-05-08 RX ORDER — AMLODIPINE BESYLATE 5 MG/1
5 TABLET ORAL DAILY
Qty: 90 TABLET | Refills: 3 | Status: SHIPPED | OUTPATIENT
Start: 2024-05-08

## 2024-05-08 NOTE — TELEPHONE ENCOUNTER
Med continued OV 4/5/24 REFILLED AS BELOW  Requested Prescriptions     Signed Prescriptions Disp Refills    amLODIPine (NORVASC) 5 MG tablet 90 tablet 3     Sig: Take 1 tablet by mouth daily     Authorizing Provider: PHONG URBINA     Ordering User: PAPO CERDA

## 2024-05-09 ENCOUNTER — OFFICE VISIT (OUTPATIENT)
Dept: INTERNAL MEDICINE CLINIC | Facility: CLINIC | Age: 54
End: 2024-05-09
Payer: MEDICARE

## 2024-05-09 VITALS
TEMPERATURE: 98.2 F | DIASTOLIC BLOOD PRESSURE: 86 MMHG | BODY MASS INDEX: 27.98 KG/M2 | SYSTOLIC BLOOD PRESSURE: 131 MMHG | HEIGHT: 75 IN | WEIGHT: 225 LBS | HEART RATE: 75 BPM | OXYGEN SATURATION: 98 %

## 2024-05-09 DIAGNOSIS — K21.9 GASTROESOPHAGEAL REFLUX DISEASE, UNSPECIFIED WHETHER ESOPHAGITIS PRESENT: ICD-10-CM

## 2024-05-09 DIAGNOSIS — E78.49 OTHER HYPERLIPIDEMIA: ICD-10-CM

## 2024-05-09 DIAGNOSIS — I25.10 ASHD (ARTERIOSCLEROTIC HEART DISEASE): ICD-10-CM

## 2024-05-09 DIAGNOSIS — I10 ESSENTIAL HYPERTENSION: ICD-10-CM

## 2024-05-09 DIAGNOSIS — E11.9 TYPE 2 DIABETES MELLITUS WITHOUT COMPLICATION, WITHOUT LONG-TERM CURRENT USE OF INSULIN (HCC): Primary | ICD-10-CM

## 2024-05-09 PROCEDURE — G2211 COMPLEX E/M VISIT ADD ON: HCPCS | Performed by: INTERNAL MEDICINE

## 2024-05-09 PROCEDURE — 3079F DIAST BP 80-89 MM HG: CPT | Performed by: INTERNAL MEDICINE

## 2024-05-09 PROCEDURE — 99214 OFFICE O/P EST MOD 30 MIN: CPT | Performed by: INTERNAL MEDICINE

## 2024-05-09 PROCEDURE — 3044F HG A1C LEVEL LT 7.0%: CPT | Performed by: INTERNAL MEDICINE

## 2024-05-09 PROCEDURE — 3075F SYST BP GE 130 - 139MM HG: CPT | Performed by: INTERNAL MEDICINE

## 2024-05-09 RX ORDER — ATORVASTATIN CALCIUM 80 MG/1
TABLET, FILM COATED ORAL
Qty: 90 TABLET | Refills: 1 | Status: SHIPPED | OUTPATIENT
Start: 2024-05-09

## 2024-05-09 RX ORDER — CARVEDILOL 25 MG/1
TABLET ORAL
Qty: 180 TABLET | Refills: 1 | Status: SHIPPED | OUTPATIENT
Start: 2024-05-09

## 2024-05-09 RX ORDER — SITAGLIPTIN AND METFORMIN HYDROCHLORIDE 1000; 100 MG/1; MG/1
1 TABLET, FILM COATED, EXTENDED RELEASE ORAL DAILY
Qty: 90 TABLET | Refills: 1 | Status: SHIPPED | OUTPATIENT
Start: 2024-05-09

## 2024-05-09 ASSESSMENT — ENCOUNTER SYMPTOMS: SHORTNESS OF BREATH: 0

## 2024-05-09 NOTE — PROGRESS NOTES
[facility]  Simone Velásquez M.D.  Internal Medicine  [address] [place] [id number]  Office : [phone]  Fax : [phone]    Chief Complaint   Patient presents with    Diabetes     4 mo follow up       History of Present Illness:  Gume Guillen Jr. is a 53 y.o. male.  HPI    Diabetes Mellitus  Patient presents  for follow up on diabetes.  Onset of symptoms was several years ago. Patient describes symptoms as none. Course to date has been well controlled.Diet and Lifestyle: follows a diabetic diet regularly  exercises sporadically  nonsmoker  Home glucose monitoring:  Results average 120. Patient denies polyuria and polydipsia. No wounds in lower limbs.  Most recent HbA1c was   Hemoglobin A1C   Date Value Ref Range Status   01/08/2024 6.9 (H) 4.8 - 5.6 % Final       Hypertension  Patient is in for Hypertension which is stable.    Diet and Lifestyle: generally follows a low sodium diet  Home BP Monitoring: is not measured at home. Patient's recent blood pressures were   BP Readings from Last 3 Encounters:   05/09/24 131/86   04/05/24 (!) 124/92   02/07/24 132/80   .   taking medications as instructed, no medication side effects noted, no TIA's, no chest pain on exertion, no dyspnea on exertion, no swelling of ankles. Cardiac risk factors consist of diabetes mellitus, dyslipidemia, hypertension, and male gender.  Lab Results   Component Value Date/Time     01/20/2024 08:38 AM    K 3.6 01/20/2024 08:38 AM     01/20/2024 08:38 AM    CO2 23 01/20/2024 08:38 AM    BUN 12 01/20/2024 08:38 AM    CREATININE 1.10 01/20/2024 08:38 AM    GLUCOSE 151 01/20/2024 08:38 AM    CALCIUM 8.4 01/20/2024 08:38 AM    LABGLOM >60 01/20/2024 08:38 AM        Hyperlipidemia  Patient is in for follow-up for hyperlipidemia.  Diet and Lifestyle: nonsmoker. Risk factors for vascular disease consist of hyperlipidemia, coronary artery disease, hypertension, and diabetes.

## 2024-05-30 ENCOUNTER — OFFICE VISIT (OUTPATIENT)
Dept: INTERNAL MEDICINE CLINIC | Facility: CLINIC | Age: 54
End: 2024-05-30
Payer: MEDICARE

## 2024-05-30 VITALS
TEMPERATURE: 98 F | HEART RATE: 72 BPM | WEIGHT: 232 LBS | HEIGHT: 75 IN | BODY MASS INDEX: 28.85 KG/M2 | SYSTOLIC BLOOD PRESSURE: 128 MMHG | OXYGEN SATURATION: 99 % | DIASTOLIC BLOOD PRESSURE: 87 MMHG

## 2024-05-30 DIAGNOSIS — S80.12XA CONTUSION OF MULTIPLE SITES OF LEFT LOWER EXTREMITY, INITIAL ENCOUNTER: Primary | ICD-10-CM

## 2024-05-30 PROCEDURE — 3074F SYST BP LT 130 MM HG: CPT | Performed by: INTERNAL MEDICINE

## 2024-05-30 PROCEDURE — 99213 OFFICE O/P EST LOW 20 MIN: CPT | Performed by: INTERNAL MEDICINE

## 2024-05-30 PROCEDURE — 3079F DIAST BP 80-89 MM HG: CPT | Performed by: INTERNAL MEDICINE

## 2024-05-30 NOTE — PROGRESS NOTES
[facility]  Simone Velásquez M.D.  Internal Medicine  [address] [place] [address]  Office : [phone]  Fax : [phone]    Chief Complaint   Patient presents with    Leg Injury     Lower left leg injury from tractor last Friday with pain and swelling       History of Present Illness:  Gume Guillen Jr. is a 53 y.o. male.  Leg Injury  This is a new problem. The current episode started in the past 7 days. The problem occurs constantly. The problem has been rapidly improving. Pertinent negatives include no chills, fever or weakness. Associated symptoms comments: Pressure on injury. The symptoms are aggravated by bending. He has tried rest for the symptoms. The treatment provided mild relief.   He has bruising , but no skin breakdown        Past Medical History:  Past Medical History:   Diagnosis Date    Arthritis     Benign essential hypertension     CAD (coronary artery disease)     MI in 12/07, 6/2017    Chronic pain     Dilated cardiomyopathy (HCC)     Gastrointestinal hemorrhage with hematemesis 6/20/2017    GERD (gastroesophageal reflux disease)     Heart failure (HCC)     Hyperlipidemia LDL goal <70     Psychiatric disorder     depression and anxiety    Renal insufficiency      Past Surgical History:  Past Surgical History:   Procedure Laterality Date    CARDIAC PROCEDURE N/A 1/12/2024    Left heart cath / coronary angiography performed by Dakota Leyva MD at [facility] CARDIAC CATH LAB    CARDIAC PROCEDURE N/A 1/12/2024    Percutaneous coronary intervention performed by Dakota Leyva MD at [facility] CARDIAC CATH LAB    CORONARY ANGIOPLASTY WITH STENT PLACEMENT  06/2017    RCA    LEFT HEART CATH,PERCUTANEOUS  06/19/2017    STEMI & PCI    ORTHOPEDIC SURGERY      right femur ORIF surg    WISDOM TOOTH EXTRACTION       Allergies:   Allergies   Allergen Reactions    Abciximab Other (See Comments)     Thrombocytopenia    Lisinopril Swelling

## 2024-06-06 NOTE — TELEPHONE ENCOUNTER
MEDICATION REFILL REQUEST      Name of Medication:  Xarelto  Dose:  2.5 mg  Frequency:  BID  Quantity:  60  Days' supply:  30 with 11 refills      Pharmacy Name/Location:  [phone]

## 2024-06-07 NOTE — TELEPHONE ENCOUNTER
Per medical record, Xarelto was continued at last office visit with Dr. Lorenzo on 4/5/24. Next scheduled appointment with Dr. Lorenzo is 10/7/24.   Requested Prescriptions     Pending Prescriptions Disp Refills    rivaroxaban (XARELTO) 2.5 MG TABS tablet 180 tablet 3     Sig: Take 1 tablet by mouth 2 times daily     Xarelto refill, as above, sent to Dr. Lorenzo for approval.

## 2024-06-07 NOTE — TELEPHONE ENCOUNTER
Patient calling back regarding Rx needed for Xarelto. Please call patient back with update on when this will be called in.

## 2024-06-10 ENCOUNTER — TELEPHONE (OUTPATIENT)
Age: 54
End: 2024-06-10

## 2024-06-10 NOTE — TELEPHONE ENCOUNTER
[facility] pharmacy [facility] patient was given partial fill of 10 tablets of Xarelto 2.5 mg BID because they did not have more Xarelto 2.5 mg tablets, at that time. She states remainder of Xarelto refill should arrive, today. Advised patient of above. Advised patient to call back, if Xarelto does not arrive, today. Patient verbalized understanding.

## 2024-06-10 NOTE — TELEPHONE ENCOUNTER
Patient states when he picked up Xarelto 2.5 mg BID refill sent to pharmacy on 6/7/24, he only received 10 tablets, and will run out, very soon.     Patient requests at least 60 tablets of Xarelto to last 1 month.     Per medical record, advised patient Xarelto 2.5 mg BID #180 with 3 refills was E-prescribed to [facility] in [place] on 6/7/24. Advised patient that I will call [facility], and call back, if any problem filling Xarelto #180. Patient verbalized understanding.

## 2024-06-20 NOTE — PROGRESS NOTES
Stroke Mother        Allergies   Allergen Reactions    Abciximab Other (See Comments)     Thrombocytopenia    Lisinopril Swelling       Current Outpatient Medications   Medication Sig    rivaroxaban (XARELTO) 2.5 MG TABS tablet Take 1 tablet by mouth 2 times daily    atorvastatin (LIPITOR) 80 MG tablet TAKE 1 TABLET BY MOUTH DAILY    carvedilol (COREG) 25 MG tablet TAKE 1 TABLET BY MOUTH TWICE DAILY    empagliflozin (JARDIANCE) 25 MG tablet Take 1 tablet by mouth daily    SITagliptin-metFORMIN HCl ER (JANUMET XR) 100-1000 MG TB24 Take 1 tablet by mouth daily    amLODIPine (NORVASC) 5 MG tablet Take 1 tablet by mouth daily    nitroGLYCERIN (NITROSTAT) 0.4 MG SL tablet Place 1 tablet under the tongue every 5 minutes as needed for Chest pain up to max of 3 total doses. If no relief after 1 dose, call 911.    ezetimibe (ZETIA) 10 MG tablet Take 1 tablet by mouth daily    aspirin 81 MG EC tablet Take 1 tablet by mouth daily    clopidogrel (PLAVIX) 75 MG tablet Take 1 tablet by mouth daily    losartan (COZAAR) 100 MG tablet TAKE 1 TABLET BY MOUTH EVERY DAY     No current facility-administered medications for this visit.       Over 50% of today's office visit was spent in face to face time reviewing test results, prognosis, importance of compliance, education about disease process, benefits of medications, instructions for management of acute symptoms, and follow up plans.     Electronically signed. Dictated using voice recognition software.  Proof read but unrecognized errors may exist.

## 2024-06-21 ENCOUNTER — OFFICE VISIT (OUTPATIENT)
Dept: PULMONOLOGY | Age: 54
End: 2024-06-21
Payer: MEDICARE

## 2024-06-21 VITALS
SYSTOLIC BLOOD PRESSURE: 120 MMHG | TEMPERATURE: 97.9 F | BODY MASS INDEX: 27.73 KG/M2 | HEART RATE: 82 BPM | DIASTOLIC BLOOD PRESSURE: 90 MMHG | RESPIRATION RATE: 17 BRPM | HEIGHT: 75 IN | WEIGHT: 223 LBS | OXYGEN SATURATION: 97 %

## 2024-06-21 DIAGNOSIS — J98.4 RESTRICTIVE LUNG DISEASE: ICD-10-CM

## 2024-06-21 DIAGNOSIS — F17.211 CIGARETTE NICOTINE DEPENDENCE IN REMISSION: ICD-10-CM

## 2024-06-21 DIAGNOSIS — G47.33 OSA (OBSTRUCTIVE SLEEP APNEA): ICD-10-CM

## 2024-06-21 DIAGNOSIS — R91.8 PULMONARY NODULES: Primary | ICD-10-CM

## 2024-06-21 PROCEDURE — 3080F DIAST BP >= 90 MM HG: CPT | Performed by: NURSE PRACTITIONER

## 2024-06-21 PROCEDURE — 99214 OFFICE O/P EST MOD 30 MIN: CPT | Performed by: NURSE PRACTITIONER

## 2024-06-21 PROCEDURE — 3074F SYST BP LT 130 MM HG: CPT | Performed by: NURSE PRACTITIONER

## 2024-06-21 ASSESSMENT — ENCOUNTER SYMPTOMS
COUGH: 1
SHORTNESS OF BREATH: 0
HEMOPTYSIS: 0
SPUTUM PRODUCTION: 0
WHEEZING: 0

## 2024-06-21 NOTE — PATIENT INSTRUCTIONS
Chest CT-we will call with results.  Order remains in effect.    Radiology scheduling dept # is provided if necessary to schedule chest CT ([phone])    Complete pulmonary function test.  I will call him with results.    Continue CPAP as prescribed by the sleep center, needs follow-up appointment in sleep center.    He is commended in smoking cessation.  Alpha 1 antitrypsin level was obtained today.  I will call him with results.    Recommend newest COVID booster, seasonal flu vaccines.  Recommend Prevnar 20 pneumonia vaccine.    Follow-up in 6 months with Dr. Huynh, subject to change based on findings from above tests.

## 2024-07-12 ENCOUNTER — TELEPHONE (OUTPATIENT)
Dept: PULMONOLOGY | Age: 54
End: 2024-07-12

## 2024-07-12 NOTE — TELEPHONE ENCOUNTER
Called patient to let him know the results were normal. He thanked me and had no other questions. EILEEN LOPEZ MA

## 2024-07-12 NOTE — TELEPHONE ENCOUNTER
----- Message from MINDA Moreno - CNP sent at 7/12/2024  8:46 AM EDT -----  Please let him know that alpha 1 level and genotype are normal.

## 2024-07-24 NOTE — CARE COORDINATION
ARRIVAL INFORMATION:  Verified patient name and date of birth, scheduled procedure, and informed consent.     : adan cleaning  friend contact number: [phone]  Physician and staff can share information with the .     Belongings with patient include:  Clothing,Jewelry earrings    GI FOCUSED ASSESSMENT:  Neuro: Awake, alert, oriented x4  Respiratory: even and unlabored   GI: soft and non-distended  EKG Rhythm: normal sinus rhythm    Education:Reviewed general discharge instructions and  information.      Ambulatory Care Management Outreach Attempt    This patient was received as a referral from  Daily assignment for case management of ed utilizer.    Attempted to reach patient for ED follow up. Pt has respectfully declined services at this time, my contact information has been shared with pt in the event there circumstances change. They are free to reach out at any time. ACM signing off.      Patient: Gume Guillen Jr. Patient : 1970 MRN: [id number]    Last Discharge Facility       Date Complaint Diagnosis Description Type Department Provider    24 Chest Pain Chest pain, unspecified type ED (DISCHARGE) SFKANIKA Adamson, Long Pacheco MD; Scott Sarkar...                Noted following upcoming appointments from discharge chart review:   [facility] follow up appointment(s):   Future Appointments   Date Time Provider Department Center   2024 10:15 AM [facility] CT1 [facility] 256 SLICE [facility]   2024  9:30 AM Yayo Lorenzo MD [facility] AMB   2024  8:45 AM [facility]     Non-BS follow up appointment(s):

## 2024-09-23 ENCOUNTER — OFFICE VISIT (OUTPATIENT)
Dept: INTERNAL MEDICINE CLINIC | Facility: CLINIC | Age: 54
End: 2024-09-23
Payer: MEDICARE

## 2024-09-23 VITALS
WEIGHT: 233 LBS | HEART RATE: 84 BPM | BODY MASS INDEX: 28.97 KG/M2 | SYSTOLIC BLOOD PRESSURE: 124 MMHG | DIASTOLIC BLOOD PRESSURE: 77 MMHG | HEIGHT: 75 IN | TEMPERATURE: 97.4 F | OXYGEN SATURATION: 97 %

## 2024-09-23 DIAGNOSIS — E11.9 TYPE 2 DIABETES MELLITUS WITHOUT COMPLICATION, WITHOUT LONG-TERM CURRENT USE OF INSULIN (HCC): Primary | ICD-10-CM

## 2024-09-23 DIAGNOSIS — I10 ESSENTIAL HYPERTENSION: ICD-10-CM

## 2024-09-23 DIAGNOSIS — E78.49 OTHER HYPERLIPIDEMIA: ICD-10-CM

## 2024-09-23 DIAGNOSIS — G47.33 OSA (OBSTRUCTIVE SLEEP APNEA): ICD-10-CM

## 2024-09-23 DIAGNOSIS — E11.9 TYPE 2 DIABETES MELLITUS WITHOUT COMPLICATION, WITHOUT LONG-TERM CURRENT USE OF INSULIN (HCC): ICD-10-CM

## 2024-09-23 DIAGNOSIS — I25.10 ASHD (ARTERIOSCLEROTIC HEART DISEASE): ICD-10-CM

## 2024-09-23 LAB
ANION GAP SERPL CALC-SCNC: 11 MMOL/L (ref 9–18)
BUN SERPL-MCNC: 12 MG/DL (ref 6–23)
CALCIUM SERPL-MCNC: 8.8 MG/DL (ref 8.8–10.2)
CHLORIDE SERPL-SCNC: 107 MMOL/L (ref 98–107)
CO2 SERPL-SCNC: 20 MMOL/L (ref 20–28)
CREAT SERPL-MCNC: 1.09 MG/DL (ref 0.8–1.3)
EST. AVERAGE GLUCOSE BLD GHB EST-MCNC: 180 MG/DL
GLUCOSE SERPL-MCNC: 167 MG/DL (ref 70–99)
HBA1C MFR BLD: 7.9 % (ref 0–5.6)
POTASSIUM SERPL-SCNC: 3.9 MMOL/L (ref 3.5–5.1)
SODIUM SERPL-SCNC: 139 MMOL/L (ref 136–145)

## 2024-09-23 PROCEDURE — 3074F SYST BP LT 130 MM HG: CPT | Performed by: INTERNAL MEDICINE

## 2024-09-23 PROCEDURE — 3078F DIAST BP <80 MM HG: CPT | Performed by: INTERNAL MEDICINE

## 2024-09-23 PROCEDURE — 3044F HG A1C LEVEL LT 7.0%: CPT | Performed by: INTERNAL MEDICINE

## 2024-09-23 PROCEDURE — G2211 COMPLEX E/M VISIT ADD ON: HCPCS | Performed by: INTERNAL MEDICINE

## 2024-09-23 PROCEDURE — 99214 OFFICE O/P EST MOD 30 MIN: CPT | Performed by: INTERNAL MEDICINE

## 2024-09-23 RX ORDER — CARVEDILOL 25 MG/1
TABLET ORAL
Qty: 180 TABLET | Refills: 1 | Status: SHIPPED | OUTPATIENT
Start: 2024-09-23

## 2024-09-23 RX ORDER — ATORVASTATIN CALCIUM 80 MG/1
TABLET, FILM COATED ORAL
Qty: 90 TABLET | Refills: 1 | Status: SHIPPED | OUTPATIENT
Start: 2024-09-23

## 2024-09-23 RX ORDER — SITAGLIPTIN AND METFORMIN HYDROCHLORIDE 1000; 100 MG/1; MG/1
1 TABLET, FILM COATED, EXTENDED RELEASE ORAL DAILY
Qty: 90 TABLET | Refills: 1 | Status: SHIPPED | OUTPATIENT
Start: 2024-09-23

## 2024-09-23 SDOH — ECONOMIC STABILITY: FOOD INSECURITY: WITHIN THE PAST 12 MONTHS, THE FOOD YOU BOUGHT JUST DIDN'T LAST AND YOU DIDN'T HAVE MONEY TO GET MORE.: NEVER TRUE

## 2024-09-23 SDOH — ECONOMIC STABILITY: FOOD INSECURITY: WITHIN THE PAST 12 MONTHS, YOU WORRIED THAT YOUR FOOD WOULD RUN OUT BEFORE YOU GOT MONEY TO BUY MORE.: NEVER TRUE

## 2024-09-23 SDOH — ECONOMIC STABILITY: INCOME INSECURITY: HOW HARD IS IT FOR YOU TO PAY FOR THE VERY BASICS LIKE FOOD, HOUSING, MEDICAL CARE, AND HEATING?: NOT HARD AT ALL

## 2024-09-23 ASSESSMENT — PATIENT HEALTH QUESTIONNAIRE - PHQ9
SUM OF ALL RESPONSES TO PHQ QUESTIONS 1-9: 0
5. POOR APPETITE OR OVEREATING: NOT AT ALL
3. TROUBLE FALLING OR STAYING ASLEEP: NOT AT ALL
4. FEELING TIRED OR HAVING LITTLE ENERGY: NOT AT ALL
2. FEELING DOWN, DEPRESSED OR HOPELESS: NOT AT ALL
9. THOUGHTS THAT YOU WOULD BE BETTER OFF DEAD, OR OF HURTING YOURSELF: NOT AT ALL
SUM OF ALL RESPONSES TO PHQ QUESTIONS 1-9: 0
SUM OF ALL RESPONSES TO PHQ QUESTIONS 1-9: 0
8. MOVING OR SPEAKING SO SLOWLY THAT OTHER PEOPLE COULD HAVE NOTICED. OR THE OPPOSITE, BEING SO FIGETY OR RESTLESS THAT YOU HAVE BEEN MOVING AROUND A LOT MORE THAN USUAL: NOT AT ALL
1. LITTLE INTEREST OR PLEASURE IN DOING THINGS: NOT AT ALL
SUM OF ALL RESPONSES TO PHQ QUESTIONS 1-9: 0
SUM OF ALL RESPONSES TO PHQ9 QUESTIONS 1 & 2: 0
10. IF YOU CHECKED OFF ANY PROBLEMS, HOW DIFFICULT HAVE THESE PROBLEMS MADE IT FOR YOU TO DO YOUR WORK, TAKE CARE OF THINGS AT HOME, OR GET ALONG WITH OTHER PEOPLE: NOT DIFFICULT AT ALL
7. TROUBLE CONCENTRATING ON THINGS, SUCH AS READING THE NEWSPAPER OR WATCHING TELEVISION: NOT AT ALL
6. FEELING BAD ABOUT YOURSELF - OR THAT YOU ARE A FAILURE OR HAVE LET YOURSELF OR YOUR FAMILY DOWN: NOT AT ALL

## 2024-09-23 ASSESSMENT — ANXIETY QUESTIONNAIRES
7. FEELING AFRAID AS IF SOMETHING AWFUL MIGHT HAPPEN: NOT AT ALL
2. NOT BEING ABLE TO STOP OR CONTROL WORRYING: NOT AT ALL
4. TROUBLE RELAXING: NOT AT ALL
GAD7 TOTAL SCORE: 0
6. BECOMING EASILY ANNOYED OR IRRITABLE: NOT AT ALL
5. BEING SO RESTLESS THAT IT IS HARD TO SIT STILL: NOT AT ALL
3. WORRYING TOO MUCH ABOUT DIFFERENT THINGS: NOT AT ALL
IF YOU CHECKED OFF ANY PROBLEMS ON THIS QUESTIONNAIRE, HOW DIFFICULT HAVE THESE PROBLEMS MADE IT FOR YOU TO DO YOUR WORK, TAKE CARE OF THINGS AT HOME, OR GET ALONG WITH OTHER PEOPLE: NOT DIFFICULT AT ALL
1. FEELING NERVOUS, ANXIOUS, OR ON EDGE: NOT AT ALL

## 2024-09-23 ASSESSMENT — ENCOUNTER SYMPTOMS
ABDOMINAL PAIN: 0
SHORTNESS OF BREATH: 0

## 2024-09-25 ENCOUNTER — HOSPITAL ENCOUNTER (OUTPATIENT)
Dept: CT IMAGING | Age: 54
Discharge: HOME | End: 2024-09-28
Attending: INTERNAL MEDICINE
Payer: MEDICARE

## 2024-09-25 DIAGNOSIS — R91.8 PULMONARY NODULES: ICD-10-CM

## 2024-09-25 DIAGNOSIS — J98.4 RESTRICTIVE LUNG DISEASE: ICD-10-CM

## 2024-09-25 PROCEDURE — 71250 CT THORAX DX C-: CPT

## 2024-09-27 ENCOUNTER — HOSPITAL ENCOUNTER (EMERGENCY)
Age: 54
Discharge: LEFT BEFORE DISCHARGE | End: 2024-09-27
Attending: EMERGENCY MEDICINE
Payer: MEDICARE

## 2024-09-27 ENCOUNTER — APPOINTMENT (OUTPATIENT)
Dept: GENERAL RADIOLOGY | Age: 54
End: 2024-09-27
Payer: MEDICARE

## 2024-09-27 VITALS
OXYGEN SATURATION: 98 % | TEMPERATURE: 99.1 F | WEIGHT: 233 LBS | DIASTOLIC BLOOD PRESSURE: 101 MMHG | SYSTOLIC BLOOD PRESSURE: 139 MMHG | HEART RATE: 80 BPM | BODY MASS INDEX: 28.97 KG/M2 | HEIGHT: 75 IN | RESPIRATION RATE: 18 BRPM

## 2024-09-27 DIAGNOSIS — Z87.09 HISTORY OF RESTRICTIVE LUNG DISEASE: ICD-10-CM

## 2024-09-27 DIAGNOSIS — R06.02 SHORTNESS OF BREATH: Primary | ICD-10-CM

## 2024-09-27 DIAGNOSIS — Z86.79 HISTORY OF CORONARY ARTERY DISEASE: ICD-10-CM

## 2024-09-27 LAB
ALBUMIN SERPL-MCNC: 4 G/DL (ref 3.5–5)
ALBUMIN SERPL-MCNC: 4.3 G/DL (ref 3.5–5)
ALBUMIN/GLOB SERPL: 1.2 (ref 1–1.9)
ALBUMIN/GLOB SERPL: 1.2 (ref 1–1.9)
ALP SERPL-CCNC: 107 U/L (ref 40–129)
ALP SERPL-CCNC: 115 U/L (ref 40–129)
ALT SERPL-CCNC: 18 U/L (ref 8–55)
ALT SERPL-CCNC: 20 U/L (ref 8–55)
ANION GAP SERPL CALC-SCNC: 12 MMOL/L (ref 9–18)
ANION GAP SERPL CALC-SCNC: 17 MMOL/L (ref 9–18)
AST SERPL-CCNC: 20 U/L (ref 15–37)
AST SERPL-CCNC: 20 U/L (ref 15–37)
BASOPHILS # BLD: 0.1 K/UL (ref 0–0.2)
BASOPHILS NFR BLD: 1 % (ref 0–2)
BILIRUB SERPL-MCNC: 0.3 MG/DL (ref 0–1.2)
BILIRUB SERPL-MCNC: 0.6 MG/DL (ref 0–1.2)
BUN SERPL-MCNC: 11 MG/DL (ref 6–23)
BUN SERPL-MCNC: 12 MG/DL (ref 6–23)
CALCIUM SERPL-MCNC: 8.7 MG/DL (ref 8.8–10.2)
CALCIUM SERPL-MCNC: 8.9 MG/DL (ref 8.8–10.2)
CHLORIDE SERPL-SCNC: 109 MMOL/L (ref 98–107)
CHLORIDE SERPL-SCNC: 109 MMOL/L (ref 98–107)
CO2 SERPL-SCNC: 19 MMOL/L (ref 20–28)
CO2 SERPL-SCNC: ABNORMAL MMOL/L (ref 20–28)
CREAT SERPL-MCNC: 0.93 MG/DL (ref 0.8–1.3)
CREAT SERPL-MCNC: 0.96 MG/DL (ref 0.8–1.3)
DIFFERENTIAL METHOD BLD: ABNORMAL
EOSINOPHIL # BLD: 0.5 K/UL (ref 0–0.8)
EOSINOPHIL NFR BLD: 7 % (ref 0.5–7.8)
ERYTHROCYTE [DISTWIDTH] IN BLOOD BY AUTOMATED COUNT: 13.4 % (ref 11.9–14.6)
GLOBULIN SER CALC-MCNC: 3.3 G/DL (ref 2.3–3.5)
GLOBULIN SER CALC-MCNC: 3.5 G/DL (ref 2.3–3.5)
GLUCOSE SERPL-MCNC: 114 MG/DL (ref 70–99)
GLUCOSE SERPL-MCNC: 144 MG/DL (ref 70–99)
HCT VFR BLD AUTO: 48 % (ref 41.1–50.3)
HGB BLD-MCNC: 15.7 G/DL (ref 13.6–17.2)
IMM GRANULOCYTES # BLD AUTO: 0 K/UL (ref 0–0.5)
IMM GRANULOCYTES NFR BLD AUTO: 0 % (ref 0–5)
LYMPHOCYTES # BLD: 2 K/UL (ref 0.5–4.6)
LYMPHOCYTES NFR BLD: 28 % (ref 13–44)
MAGNESIUM SERPL-MCNC: 2.1 MG/DL (ref 1.8–2.4)
MCH RBC QN AUTO: 29.3 PG (ref 26.1–32.9)
MCHC RBC AUTO-ENTMCNC: 32.7 G/DL (ref 31.4–35)
MCV RBC AUTO: 89.7 FL (ref 82–102)
MONOCYTES # BLD: 0.5 K/UL (ref 0.1–1.3)
MONOCYTES NFR BLD: 7 % (ref 4–12)
NEUTS SEG # BLD: 4.1 K/UL (ref 1.7–8.2)
NEUTS SEG NFR BLD: 57 % (ref 43–78)
NRBC # BLD: 0 K/UL (ref 0–0.2)
NT PRO BNP: 153 PG/ML (ref 0–125)
NT PRO BNP: 166 PG/ML (ref 0–125)
PLATELET # BLD AUTO: 169 K/UL (ref 150–450)
PMV BLD AUTO: 8.7 FL (ref 9.4–12.3)
POTASSIUM SERPL-SCNC: 3.7 MMOL/L (ref 3.5–5.1)
POTASSIUM SERPL-SCNC: 3.8 MMOL/L (ref 3.5–5.1)
PROT SERPL-MCNC: 7.3 G/DL (ref 6.3–8.2)
PROT SERPL-MCNC: 7.8 G/DL (ref 6.3–8.2)
RBC # BLD AUTO: 5.35 M/UL (ref 4.23–5.6)
SARS-COV-2 RDRP RESP QL NAA+PROBE: NOT DETECTED
SODIUM SERPL-SCNC: 140 MMOL/L (ref 136–145)
SODIUM SERPL-SCNC: 140 MMOL/L (ref 136–145)
SOURCE: NORMAL
TROPONIN T SERPL HS-MCNC: 7 NG/L (ref 0–22)
TROPONIN T SERPL HS-MCNC: 8 NG/L (ref 0–22)
WBC # BLD AUTO: 7.2 K/UL (ref 4.3–11.1)

## 2024-09-27 PROCEDURE — 85025 COMPLETE CBC W/AUTO DIFF WBC: CPT

## 2024-09-27 PROCEDURE — 4500000002 HC ER NO CHARGE

## 2024-09-27 PROCEDURE — 87635 SARS-COV-2 COVID-19 AMP PRB: CPT

## 2024-09-27 PROCEDURE — 80053 COMPREHEN METABOLIC PANEL: CPT

## 2024-09-27 PROCEDURE — 83880 ASSAY OF NATRIURETIC PEPTIDE: CPT

## 2024-09-27 PROCEDURE — 71045 X-RAY EXAM CHEST 1 VIEW: CPT

## 2024-09-27 PROCEDURE — 83735 ASSAY OF MAGNESIUM: CPT

## 2024-09-27 PROCEDURE — 84484 ASSAY OF TROPONIN QUANT: CPT

## 2024-09-27 ASSESSMENT — ENCOUNTER SYMPTOMS
ABDOMINAL PAIN: 0
WHEEZING: 0
COUGH: 0
SHORTNESS OF BREATH: 1
VOMITING: 0

## 2024-09-27 ASSESSMENT — LIFESTYLE VARIABLES
HOW MANY STANDARD DRINKS CONTAINING ALCOHOL DO YOU HAVE ON A TYPICAL DAY: PATIENT DOES NOT DRINK
HOW OFTEN DO YOU HAVE A DRINK CONTAINING ALCOHOL: NEVER

## 2024-09-27 ASSESSMENT — PAIN - FUNCTIONAL ASSESSMENT: PAIN_FUNCTIONAL_ASSESSMENT: NONE - DENIES PAIN

## 2024-09-27 NOTE — ED NOTES
Patient reports of shortness of breath without chest pain  Patient is concern due to SOB- causing heart attack- January 2024 and 2017, 2007 CHF     Veronica Monge, EDMAR  09/27/24 4239

## 2024-09-27 NOTE — ED TRIAGE NOTES
Pt arrived ambulatory to ER triage with c/o SOB that started yesterday while sitting. Pt is currently on Cpap at night. Has hx of MI, HTN, DM. Denies CP or leg swelling. Pt takes blood thinners and aspirin daily.

## 2024-09-27 NOTE — ED PROVIDER NOTES
Emergency Department Provider Note       PCP: Simone Velásquez MD   Age: 54 y.o.   Sex: male     DISPOSITION Eloped - Left Before Treatment Complete 09/27/2024 06:20:17 PM  Condition at Disposition: Data Unavailable       ICD-10-CM    1. Shortness of breath  R06.02       2. History of coronary artery disease  Z86.79       3. History of restrictive lung disease  Z87.09           Medical Decision Making   Shortness of breath.  Patient is on Xarelto due to his congestive heart failure history.  Check EKG and single troponin.  Check chest x-ray for effusion or infiltrate.  Screen for infection.  Workup negative.  Cardiology follow-up     1 or more acute illnesses that pose a threat to life or bodily function.   1 or more chronic illnesses with a severe exacerbation or progression.  Over the counter drug management performed.  I independently ordered and reviewed each unique test.    I reviewed external records: provider visit note from outside specialist.   Pulmonology notes regarding restrictive lung disease and sleep apnea.  Cardiology notes regarding coronary artery disease with stenting in the past.  Primary care doctor notes regarding type 2 diabetes    I interpreted the X-rays chest x-ray without infiltrates or effusion.  ED provider's independent EKG interpretation my interpretation EKG shows normal sinus rhythm at 78.  Occasional PVCs.  Nonspecific ST ST changes.      History     54-year-old gentleman presents with 24-hour history of shortness of breath.  Started while he was sitting.  Has had no cough.  No fever or chills.  No particular dyspnea on exertion orthopnea.  No chest pain or swelling in his legs.  Patient states he has a history of coronary disease with stenting.  He was concerned this might represent some heart issues.  I reviewed patient's records.  History of hypertension diabetes.  Coronary disease with stenting in the past.  Sleep apnea and uses CPAP at night.  No sick contacts.    The  disorder     depression and anxiety    Renal insufficiency         Past Surgical History:   Procedure Laterality Date    CARDIAC PROCEDURE N/A 2024    Left heart cath / coronary angiography performed by Dakota Leyva MD at [facility] CARDIAC CATH LAB    CARDIAC PROCEDURE N/A 2024    Percutaneous coronary intervention performed by Dakota Leyva MD at [facility] CARDIAC CATH LAB    CORONARY ANGIOPLASTY WITH STENT PLACEMENT  2017    RCA    LEFT HEART CATH,PERCUTANEOUS  2017    STEMI & PCI    ORTHOPEDIC SURGERY      right femur ORIF surg    WISDOM TOOTH EXTRACTION          Social History     Socioeconomic History    Marital status: Single   Tobacco Use    Smoking status: Former     Current packs/day: 0.00     Average packs/day: 0.3 packs/day for 30.0 years (9.0 ttl pk-yrs)     Types: Cigarettes     Start date: 1987     Quit date: 2017     Years since quittin.2    Smokeless tobacco: Never   Substance and Sexual Activity    Alcohol use: No    Drug use: No     Social Determinants of Health     Financial Resource Strain: Low Risk  (2024)    Overall Financial Resource Strain (CARDIA)     Difficulty of Paying Living Expenses: Not hard at all   Food Insecurity: No Food Insecurity (2024)    Hunger Vital Sign     Worried About Running Out of Food in the Last Year: Never true     Ran Out of Food in the Last Year: Never true   Transportation Needs: Unknown (2024)    PRAPARE - Transportation     Lack of Transportation (Non-Medical): No   Social Connections: Unknown (3/20/2021)    Received from [email or web address]    Social Connections     Frequency of Communication with Friends and Family: Not asked     Frequency of Social Gatherings with Friends and Family: Not asked   Intimate Partner Violence: Unknown (3/20/2021)    Received from [email or web address]    Intimate Partner Violence     Fear of Current or Ex-Partner: Not asked     Emotionally Abused: Not asked

## 2024-10-06 NOTE — PROGRESS NOTES
[facility] CARDIOLOGY Follow Up                 Reason for Visit: CCD    Subjective:     Patient is a 54 y.o. male with a PMH of CAD status post PCI, ICM, hypertension, and hyperlipidemia who presents for follow-up.  The patient was last seen in April 2024.  He had a TTE in January 2024 that was noted to demonstrate an EF of 45 to 50% with RWMA's.  The patient visited the ER on September 27 with SOB.  Troponin was normal x 2.  NT proBNP was 166; however, CXR did not note pulmonary edema.  He was subsequent discharged from the ED.  The patient denies angina and dyspnea.    Past Medical History:   Diagnosis Date    Arthritis     Benign essential hypertension     CAD (coronary artery disease)     MI in 12/07, 6/2017    Chronic pain     Dilated cardiomyopathy (HCC)     Gastrointestinal hemorrhage with hematemesis 6/20/2017    GERD (gastroesophageal reflux disease)     Heart failure (HCC)     Hyperlipidemia LDL goal <70     PIO (obstructive sleep apnea) 6/21/2024    Psychiatric disorder     depression and anxiety    Renal insufficiency       Past Surgical History:   Procedure Laterality Date    CARDIAC PROCEDURE N/A 1/12/2024    Left heart cath / coronary angiography performed by Dakota Leyav MD at [facility] CARDIAC CATH LAB    CARDIAC PROCEDURE N/A 1/12/2024    Percutaneous coronary intervention performed by Dakota Leyva MD at [facility] CARDIAC CATH LAB    CORONARY ANGIOPLASTY WITH STENT PLACEMENT  06/2017    RCA    LEFT HEART CATH,PERCUTANEOUS  06/19/2017    STEMI & PCI    ORTHOPEDIC SURGERY      right femur ORIF surg    WISDOM TOOTH EXTRACTION        Family History   Problem Relation Age of Onset    Diabetes Mother     Hypertension Mother     Stroke Mother     Heart Disease Father     Hypertension Father     Cancer Father         possible prostate    Emphysema Father       Social History     Tobacco Use    Smoking status: Former     Current packs/day: 0.00     Average packs/day: 0.3 packs/day for 30.0 years (9.0 ttl

## 2024-10-07 ENCOUNTER — OFFICE VISIT (OUTPATIENT)
Age: 54
End: 2024-10-07

## 2024-10-07 VITALS
HEART RATE: 74 BPM | DIASTOLIC BLOOD PRESSURE: 88 MMHG | HEIGHT: 75 IN | WEIGHT: 230 LBS | SYSTOLIC BLOOD PRESSURE: 136 MMHG | BODY MASS INDEX: 28.6 KG/M2

## 2024-10-07 DIAGNOSIS — I25.10 CAD IN NATIVE ARTERY: Primary | ICD-10-CM

## 2024-10-07 DIAGNOSIS — E78.5 HYPERLIPIDEMIA, UNSPECIFIED HYPERLIPIDEMIA TYPE: ICD-10-CM

## 2024-10-07 DIAGNOSIS — I25.5 ISCHEMIC CARDIOMYOPATHY: ICD-10-CM

## 2024-10-07 DIAGNOSIS — I10 HYPERTENSION, UNSPECIFIED TYPE: ICD-10-CM

## 2024-12-10 ENCOUNTER — OFFICE VISIT (OUTPATIENT)
Dept: INTERNAL MEDICINE CLINIC | Facility: CLINIC | Age: 54
End: 2024-12-10
Payer: MEDICARE

## 2024-12-10 VITALS
OXYGEN SATURATION: 96 % | HEIGHT: 75 IN | BODY MASS INDEX: 28.47 KG/M2 | DIASTOLIC BLOOD PRESSURE: 93 MMHG | WEIGHT: 229 LBS | SYSTOLIC BLOOD PRESSURE: 131 MMHG | TEMPERATURE: 98 F | HEART RATE: 82 BPM

## 2024-12-10 DIAGNOSIS — M54.12 CERVICAL RADICULOPATHY AT C6: Primary | ICD-10-CM

## 2024-12-10 PROCEDURE — 3080F DIAST BP >= 90 MM HG: CPT | Performed by: INTERNAL MEDICINE

## 2024-12-10 PROCEDURE — 99213 OFFICE O/P EST LOW 20 MIN: CPT | Performed by: INTERNAL MEDICINE

## 2024-12-10 PROCEDURE — 3075F SYST BP GE 130 - 139MM HG: CPT | Performed by: INTERNAL MEDICINE

## 2024-12-10 RX ORDER — PREDNISONE 10 MG/1
10 TABLET ORAL DAILY
Qty: 10 TABLET | Refills: 0 | Status: SHIPPED | OUTPATIENT
Start: 2024-12-10 | End: 2024-12-20

## 2024-12-10 NOTE — PROGRESS NOTES
[facility]  Simone Velásquez M.D.  Internal Medicine  [address] [place] [address]  Office : [phone]  Fax : [phone]    Chief Complaint   Patient presents with    Neck Pain     Neck pain hat moves dewayne the left arm x 1 week        History of Present Illness:  Gume Guillen Jr. is a 54 y.o. male.  Neck Pain   This is a new problem. The current episode started in the past 7 days. The problem occurs intermittently. The problem has been unchanged. The pain is associated with nothing. The pain is present in the left side. The quality of the pain is described as shooting. The pain is moderate. The symptoms are aggravated by bending. The pain is Worse during the day. Pertinent negatives include no fever. He has tried nothing for the symptoms. The treatment provided no relief.           Past Medical History:  Past Medical History:   Diagnosis Date    Arthritis     Benign essential hypertension     CAD (coronary artery disease)     MI in 12/07, 6/2017    Chronic pain     Dilated cardiomyopathy (HCC)     Gastrointestinal hemorrhage with hematemesis 6/20/2017    GERD (gastroesophageal reflux disease)     Heart failure (HCC)     Hyperlipidemia LDL goal <70     PIO (obstructive sleep apnea) 6/21/2024    Psychiatric disorder     depression and anxiety    Renal insufficiency      Past Surgical History:  Past Surgical History:   Procedure Laterality Date    CARDIAC PROCEDURE N/A 1/12/2024    Left heart cath / coronary angiography performed by Dakota Leyva MD at [facility] CARDIAC CATH LAB    CARDIAC PROCEDURE N/A 1/12/2024    Percutaneous coronary intervention performed by Dakota Leyva MD at [facility] CARDIAC CATH LAB    CORONARY ANGIOPLASTY WITH STENT PLACEMENT  06/2017    RCA    LEFT HEART CATH,PERCUTANEOUS  06/19/2017    STEMI & PCI    ORTHOPEDIC SURGERY      right femur ORIF surg    WISDOM TOOTH EXTRACTION       Allergies:   Allergies   Allergen Reactions

## 2024-12-16 RX ORDER — CLOPIDOGREL BISULFATE 75 MG/1
75 TABLET ORAL DAILY
Qty: 90 TABLET | Refills: 3 | Status: SHIPPED | OUTPATIENT
Start: 2024-12-16

## 2024-12-16 NOTE — TELEPHONE ENCOUNTER
MEDICATION REFILL REQUEST      Name of Medication:  clopidogrel  Dose:  75 mg  Frequency:  QD  Quantity:  90  Days' supply:  90 with 3 refills      Pharmacy Name/Location:  [phone]

## 2024-12-16 NOTE — TELEPHONE ENCOUNTER
Per medical record, Plavix was continued at last appointment with Dr. Lorenzo on 10/7/24. Next scheduled appointment with Dr. Ruth is 1/7/25.   Requested Prescriptions     Pending Prescriptions Disp Refills    clopidogrel (PLAVIX) 75 MG tablet 90 tablet 3     Sig: Take 1 tablet by mouth daily     Pended Plavix refill, as above, sent to Dr. Lorenzo for approval.

## 2025-01-05 ENCOUNTER — APPOINTMENT (OUTPATIENT)
Dept: GENERAL RADIOLOGY | Age: 55
End: 2025-01-05
Payer: MEDICARE

## 2025-01-05 ENCOUNTER — HOSPITAL ENCOUNTER (EMERGENCY)
Age: 55
Discharge: HOME | End: 2025-01-05
Payer: MEDICARE

## 2025-01-05 VITALS
RESPIRATION RATE: 16 BRPM | DIASTOLIC BLOOD PRESSURE: 97 MMHG | HEART RATE: 76 BPM | SYSTOLIC BLOOD PRESSURE: 142 MMHG | TEMPERATURE: 98.2 F | OXYGEN SATURATION: 98 %

## 2025-01-05 DIAGNOSIS — R07.9 CHEST PAIN, UNSPECIFIED TYPE: Primary | ICD-10-CM

## 2025-01-05 LAB
ALBUMIN SERPL-MCNC: 4.2 G/DL (ref 3.5–5)
ALBUMIN/GLOB SERPL: 1.2 (ref 1–1.9)
ALP SERPL-CCNC: 120 U/L (ref 40–129)
ALT SERPL-CCNC: 16 U/L (ref 8–55)
ANION GAP SERPL CALC-SCNC: 10 MMOL/L (ref 7–16)
AST SERPL-CCNC: 22 U/L (ref 15–37)
BASOPHILS # BLD: 0.1 K/UL (ref 0–0.2)
BASOPHILS NFR BLD: 1 % (ref 0–2)
BILIRUB SERPL-MCNC: 0.5 MG/DL (ref 0–1.2)
BUN SERPL-MCNC: 11 MG/DL (ref 6–23)
CALCIUM SERPL-MCNC: 9.2 MG/DL (ref 8.8–10.2)
CHLORIDE SERPL-SCNC: 107 MMOL/L (ref 98–107)
CO2 SERPL-SCNC: 21 MMOL/L (ref 20–29)
CREAT SERPL-MCNC: 1.01 MG/DL (ref 0.8–1.3)
DIFFERENTIAL METHOD BLD: ABNORMAL
EOSINOPHIL # BLD: 0.2 K/UL (ref 0–0.8)
EOSINOPHIL NFR BLD: 2 % (ref 0.5–7.8)
ERYTHROCYTE [DISTWIDTH] IN BLOOD BY AUTOMATED COUNT: 13.1 % (ref 11.9–14.6)
GLOBULIN SER CALC-MCNC: 3.4 G/DL (ref 2.3–3.5)
GLUCOSE SERPL-MCNC: 120 MG/DL (ref 70–99)
HCT VFR BLD AUTO: 49.6 % (ref 41.1–50.3)
HGB BLD-MCNC: 16.2 G/DL (ref 13.6–17.2)
IMM GRANULOCYTES # BLD AUTO: 0 K/UL (ref 0–0.5)
IMM GRANULOCYTES NFR BLD AUTO: 0 % (ref 0–5)
LYMPHOCYTES # BLD: 2.1 K/UL (ref 0.5–4.6)
LYMPHOCYTES NFR BLD: 24 % (ref 13–44)
MCH RBC QN AUTO: 29.7 PG (ref 26.1–32.9)
MCHC RBC AUTO-ENTMCNC: 32.7 G/DL (ref 31.4–35)
MCV RBC AUTO: 91 FL (ref 82–102)
MONOCYTES # BLD: 0.6 K/UL (ref 0.1–1.3)
MONOCYTES NFR BLD: 7 % (ref 4–12)
NEUTS SEG # BLD: 5.9 K/UL (ref 1.7–8.2)
NEUTS SEG NFR BLD: 66 % (ref 43–78)
NRBC # BLD: 0 K/UL (ref 0–0.2)
PLATELET # BLD AUTO: 189 K/UL (ref 150–450)
PMV BLD AUTO: 8.7 FL (ref 9.4–12.3)
POTASSIUM SERPL-SCNC: 3.6 MMOL/L (ref 3.5–5.1)
PROT SERPL-MCNC: 7.6 G/DL (ref 6.3–8.2)
RBC # BLD AUTO: 5.45 M/UL (ref 4.23–5.6)
SODIUM SERPL-SCNC: 138 MMOL/L (ref 136–145)
TROPONIN T SERPL HS-MCNC: 11 NG/L (ref 0–22)
TROPONIN T SERPL HS-MCNC: 12 NG/L (ref 0–22)
WBC # BLD AUTO: 8.8 K/UL (ref 4.3–11.1)

## 2025-01-05 PROCEDURE — 85025 COMPLETE CBC W/AUTO DIFF WBC: CPT

## 2025-01-05 PROCEDURE — 99284 EMERGENCY DEPT VISIT MOD MDM: CPT

## 2025-01-05 PROCEDURE — 71046 X-RAY EXAM CHEST 2 VIEWS: CPT

## 2025-01-05 PROCEDURE — 80053 COMPREHEN METABOLIC PANEL: CPT

## 2025-01-05 PROCEDURE — 84484 ASSAY OF TROPONIN QUANT: CPT

## 2025-01-05 NOTE — PROGRESS NOTES
[facility] CARDIOLOGY Follow Up                 Reason for Visit: CCD    Subjective:     Patient is a 54 y.o. male with a PMH of CAD status post PCI, ICM, hypertension, and hyperlipidemia who presents for follow-up.  The patient was last seen in October 2024.  He had a TTE in January 2024 that was noted to demonstrate an EF of 45 to 50% with RWMA's.  He visited the ER January 5 with chest pain.  Troponin normal x 2.  CXR negative for acute findings.  The patient reports that he had a left-sided pain just below the left arm pit toward the surface that was worse with moving his head back.  He says that this is related to cervical radiculopathy that his PCP diagnosed in mid December.  He denies dyspnea.    Past Medical History:   Diagnosis Date    Arthritis     Benign essential hypertension     CAD (coronary artery disease)     MI in 12/07, 6/2017    Chronic pain     Dilated cardiomyopathy (HCC)     Gastrointestinal hemorrhage with hematemesis 6/20/2017    GERD (gastroesophageal reflux disease)     Heart failure (HCC)     Hyperlipidemia LDL goal <70     PIO (obstructive sleep apnea) 6/21/2024    Psychiatric disorder     depression and anxiety    Renal insufficiency       Past Surgical History:   Procedure Laterality Date    CARDIAC PROCEDURE N/A 1/12/2024    Left heart cath / coronary angiography performed by Dakota Leyva MD at [facility] CARDIAC CATH LAB    CARDIAC PROCEDURE N/A 1/12/2024    Percutaneous coronary intervention performed by Dakota Leyva MD at [facility] CARDIAC CATH LAB    CORONARY ANGIOPLASTY WITH STENT PLACEMENT  06/2017    RCA    LEFT HEART CATH,PERCUTANEOUS  06/19/2017    STEMI & PCI    ORTHOPEDIC SURGERY      right femur ORIF surg    WISDOM TOOTH EXTRACTION        Family History   Problem Relation Age of Onset    Diabetes Mother     Hypertension Mother     Stroke Mother     Heart Disease Father     Hypertension Father     Cancer Father         possible prostate    Emphysema Father       Social History

## 2025-01-05 NOTE — ED PROVIDER NOTES
performed during the hospital encounter of 01/05/25   XR CHEST (2 VW)    Narrative    Chest X-ray    INDICATION: Pain    COMPARISON: Reviewed    TECHNIQUE: PA and lateral views of the chest were obtained.    FINDINGS: The lungs are clear. There are no infiltrates or effusions.  The heart  size is normal.  The bony thorax is intact.        Impression    No acute findings in the chest      Electronically signed by Chandan Ramsey   CBC with Auto Differential   Result Value Ref Range    WBC 8.8 4.3 - 11.1 K/uL    RBC 5.45 4.23 - 5.6 M/uL    Hemoglobin 16.2 13.6 - 17.2 g/dL    Hematocrit 49.6 41.1 - 50.3 %    MCV 91.0 82 - 102 FL    MCH 29.7 26.1 - 32.9 PG    MCHC 32.7 31.4 - 35.0 g/dL    RDW 13.1 11.9 - 14.6 %    Platelets 189 150 - 450 K/uL    MPV 8.7 (L) 9.4 - 12.3 FL    nRBC 0.00 0.0 - 0.2 K/uL    Differential Type AUTOMATED      Neutrophils % 66 43 - 78 %    Lymphocytes % 24 13 - 44 %    Monocytes % 7 4.0 - 12.0 %    Eosinophils % 2 0.5 - 7.8 %    Basophils % 1 0.0 - 2.0 %    Immature Granulocytes % 0 0.0 - 5.0 %    Neutrophils Absolute 5.9 1.7 - 8.2 K/UL    Lymphocytes Absolute 2.1 0.5 - 4.6 K/UL    Monocytes Absolute 0.6 0.1 - 1.3 K/UL    Eosinophils Absolute 0.2 0.0 - 0.8 K/UL    Basophils Absolute 0.1 0.0 - 0.2 K/UL    Immature Granulocytes Absolute 0.0 0.0 - 0.5 K/UL   Comprehensive Metabolic Panel w/ Reflex to MG   Result Value Ref Range    Sodium 138 136 - 145 mmol/L    Potassium 3.6 3.5 - 5.1 mmol/L    Chloride 107 98 - 107 mmol/L    CO2 21 20 - 29 mmol/L    Anion Gap 10 7 - 16 mmol/L    Glucose 120 (H) 70 - 99 mg/dL    BUN 11 6 - 23 MG/DL    Creatinine 1.01 0.80 - 1.30 MG/DL    Est, Glom Filt Rate 89 >60 ml/min/1.73m2    Calcium 9.2 8.8 - 10.2 MG/DL    Total Bilirubin 0.5 0.0 - 1.2 MG/DL    ALT 16 8 - 55 U/L    AST 22 15 - 37 U/L    Alk Phosphatase 120 40 - 129 U/L    Total Protein 7.6 6.3 - 8.2 g/dL    Albumin 4.2 3.5 - 5.0 g/dL    Globulin 3.4 2.3 - 3.5 g/dL    Albumin/Globulin Ratio 1.2 1.0 - 1.9

## 2025-01-05 NOTE — DISCHARGE INSTRUCTIONS
Drink lots of fluids, especially water.    Take Tylenol as needed for pain. Warm and cold compresses to sore areas for 15-20 minutes several times/day.    Please follow up with your doctor or specialist in 1-2 days.    Return to ER for worsening symptoms, high fever, chest pain, shortness of breath, vomiting or vomiting blood, abdominal pain or any other concerning symptoms.

## 2025-01-06 ENCOUNTER — CARE COORDINATION (OUTPATIENT)
Dept: CARE COORDINATION | Facility: CLINIC | Age: 55
End: 2025-01-06

## 2025-01-07 ENCOUNTER — OFFICE VISIT (OUTPATIENT)
Age: 55
End: 2025-01-07
Payer: MEDICARE

## 2025-01-07 VITALS
WEIGHT: 230 LBS | DIASTOLIC BLOOD PRESSURE: 86 MMHG | BODY MASS INDEX: 28.6 KG/M2 | SYSTOLIC BLOOD PRESSURE: 120 MMHG | HEIGHT: 75 IN | HEART RATE: 82 BPM

## 2025-01-07 DIAGNOSIS — I25.5 ISCHEMIC CARDIOMYOPATHY: ICD-10-CM

## 2025-01-07 DIAGNOSIS — I25.10 CAD IN NATIVE ARTERY: Primary | ICD-10-CM

## 2025-01-07 DIAGNOSIS — R07.89 ATYPICAL CHEST PAIN: ICD-10-CM

## 2025-01-07 DIAGNOSIS — E78.5 HYPERLIPIDEMIA, UNSPECIFIED HYPERLIPIDEMIA TYPE: ICD-10-CM

## 2025-01-07 DIAGNOSIS — I10 HYPERTENSION, UNSPECIFIED TYPE: ICD-10-CM

## 2025-01-07 PROCEDURE — 3074F SYST BP LT 130 MM HG: CPT | Performed by: INTERNAL MEDICINE

## 2025-01-07 PROCEDURE — 3079F DIAST BP 80-89 MM HG: CPT | Performed by: INTERNAL MEDICINE

## 2025-01-07 PROCEDURE — 99214 OFFICE O/P EST MOD 30 MIN: CPT | Performed by: INTERNAL MEDICINE

## 2025-01-30 DIAGNOSIS — I20.0 UNSTABLE ANGINA (HCC): ICD-10-CM

## 2025-01-30 DIAGNOSIS — I10 ESSENTIAL HYPERTENSION: ICD-10-CM

## 2025-01-30 DIAGNOSIS — E11.9 TYPE 2 DIABETES MELLITUS WITHOUT COMPLICATION, WITHOUT LONG-TERM CURRENT USE OF INSULIN (HCC): ICD-10-CM

## 2025-01-30 RX ORDER — AMLODIPINE BESYLATE 5 MG/1
5 TABLET ORAL DAILY
Qty: 90 TABLET | Refills: 3 | Status: CANCELLED | OUTPATIENT
Start: 2025-01-30

## 2025-01-30 RX ORDER — EZETIMIBE 10 MG/1
10 TABLET ORAL DAILY
Qty: 90 TABLET | Refills: 3 | Status: CANCELLED | OUTPATIENT
Start: 2025-01-30

## 2025-01-30 RX ORDER — LOSARTAN POTASSIUM 100 MG/1
TABLET ORAL
Qty: 90 TABLET | Refills: 3 | Status: CANCELLED | OUTPATIENT
Start: 2025-01-30

## 2025-01-31 RX ORDER — SITAGLIPTIN AND METFORMIN HYDROCHLORIDE 1000; 100 MG/1; MG/1
1 TABLET, FILM COATED, EXTENDED RELEASE ORAL DAILY
Qty: 90 TABLET | Refills: 1 | OUTPATIENT
Start: 2025-01-31

## 2025-01-31 RX ORDER — AMLODIPINE BESYLATE 5 MG/1
5 TABLET ORAL DAILY
Qty: 90 TABLET | Refills: 3 | Status: SHIPPED | OUTPATIENT
Start: 2025-01-31

## 2025-01-31 RX ORDER — NITROGLYCERIN 0.4 MG/1
0.4 TABLET SUBLINGUAL EVERY 5 MIN PRN
Qty: 25 TABLET | Refills: 3 | Status: SHIPPED | OUTPATIENT
Start: 2025-01-31

## 2025-01-31 RX ORDER — LOSARTAN POTASSIUM 100 MG/1
TABLET ORAL
Qty: 90 TABLET | Refills: 3 | Status: SHIPPED | OUTPATIENT
Start: 2025-01-31

## 2025-01-31 RX ORDER — EZETIMIBE 10 MG/1
10 TABLET ORAL DAILY
Qty: 90 TABLET | Refills: 3 | Status: SHIPPED | OUTPATIENT
Start: 2025-01-31

## 2025-01-31 RX ORDER — AMLODIPINE BESYLATE 5 MG/1
5 TABLET ORAL DAILY
Qty: 90 TABLET | Refills: 3 | OUTPATIENT
Start: 2025-01-31

## 2025-01-31 RX ORDER — CARVEDILOL 25 MG/1
TABLET ORAL
Qty: 180 TABLET | Refills: 1 | OUTPATIENT
Start: 2025-01-31

## 2025-01-31 NOTE — TELEPHONE ENCOUNTER
Last office visit 1/7/2025.    Requested Prescriptions     Pending Prescriptions Disp Refills    nitroGLYCERIN (NITROSTAT) 0.4 MG SL tablet 25 tablet 3     Sig: Place 1 tablet under the tongue every 5 minutes as needed for Chest pain up to max of 3 total doses. If no relief after 1 dose, call 911.    rivaroxaban (XARELTO) 2.5 MG TABS tablet 180 tablet 3     Sig: Take 1 tablet by mouth 2 times daily    ezetimibe (ZETIA) 10 MG tablet 90 tablet 3     Sig: Take 1 tablet by mouth daily    losartan (COZAAR) 100 MG tablet 90 tablet 3     Sig: TAKE 1 TABLET BY MOUTH EVERY DAY    amLODIPine (NORVASC) 5 MG tablet 90 tablet 3     Sig: Take 1 tablet by mouth daily

## 2025-01-31 NOTE — TELEPHONE ENCOUNTER
Called Pharmacy and spoke with Davis and verified that patient just picked up an Rx yesterday but they did receive the new Rx that was sent yesterday. Called patient and verified that he picked up Rx. Patient informed new Rx sent today . Patient voiced understanding.

## 2025-01-31 NOTE — TELEPHONE ENCOUNTER
Please see previous encounter.  Patient sent in multiple messages requesting different prescriptions.  All prescriptions have been added to one encounter.

## 2025-02-04 ENCOUNTER — OFFICE VISIT (OUTPATIENT)
Dept: INTERNAL MEDICINE CLINIC | Facility: CLINIC | Age: 55
End: 2025-02-04
Payer: MEDICARE

## 2025-02-04 VITALS
OXYGEN SATURATION: 96 % | WEIGHT: 229 LBS | HEART RATE: 84 BPM | DIASTOLIC BLOOD PRESSURE: 88 MMHG | BODY MASS INDEX: 28.47 KG/M2 | TEMPERATURE: 98 F | HEIGHT: 75 IN | SYSTOLIC BLOOD PRESSURE: 119 MMHG

## 2025-02-04 DIAGNOSIS — I10 ESSENTIAL HYPERTENSION: ICD-10-CM

## 2025-02-04 DIAGNOSIS — R91.8 PULMONARY NODULES: ICD-10-CM

## 2025-02-04 DIAGNOSIS — E11.59 TYPE 2 DIABETES MELLITUS WITH OTHER CIRCULATORY COMPLICATIONS (HCC): ICD-10-CM

## 2025-02-04 DIAGNOSIS — I25.10 ASHD (ARTERIOSCLEROTIC HEART DISEASE): ICD-10-CM

## 2025-02-04 DIAGNOSIS — E11.59 TYPE 2 DIABETES MELLITUS WITH OTHER CIRCULATORY COMPLICATIONS (HCC): Primary | ICD-10-CM

## 2025-02-04 DIAGNOSIS — E78.49 OTHER HYPERLIPIDEMIA: ICD-10-CM

## 2025-02-04 DIAGNOSIS — G47.33 OSA (OBSTRUCTIVE SLEEP APNEA): ICD-10-CM

## 2025-02-04 LAB
CREAT UR-MCNC: 68.2 MG/DL (ref 39–259)
MICROALBUMIN UR-MCNC: <1.2 MG/DL (ref 0–20)
MICROALBUMIN/CREAT UR-RTO: NORMAL MG/G (ref 0–30)

## 2025-02-04 PROCEDURE — 3074F SYST BP LT 130 MM HG: CPT | Performed by: INTERNAL MEDICINE

## 2025-02-04 PROCEDURE — G2211 COMPLEX E/M VISIT ADD ON: HCPCS | Performed by: INTERNAL MEDICINE

## 2025-02-04 PROCEDURE — 3079F DIAST BP 80-89 MM HG: CPT | Performed by: INTERNAL MEDICINE

## 2025-02-04 PROCEDURE — 99214 OFFICE O/P EST MOD 30 MIN: CPT | Performed by: INTERNAL MEDICINE

## 2025-02-04 RX ORDER — ATORVASTATIN CALCIUM 80 MG/1
TABLET, FILM COATED ORAL
Qty: 90 TABLET | Refills: 1 | Status: SHIPPED | OUTPATIENT
Start: 2025-02-04

## 2025-02-04 RX ORDER — SITAGLIPTIN AND METFORMIN HYDROCHLORIDE 1000; 100 MG/1; MG/1
1 TABLET, FILM COATED, EXTENDED RELEASE ORAL DAILY
Qty: 90 TABLET | Refills: 1 | Status: SHIPPED | OUTPATIENT
Start: 2025-02-04

## 2025-02-04 RX ORDER — CARVEDILOL 25 MG/1
TABLET ORAL
Qty: 180 TABLET | Refills: 1 | Status: SHIPPED | OUTPATIENT
Start: 2025-02-04

## 2025-02-04 SDOH — ECONOMIC STABILITY: FOOD INSECURITY: WITHIN THE PAST 12 MONTHS, THE FOOD YOU BOUGHT JUST DIDN'T LAST AND YOU DIDN'T HAVE MONEY TO GET MORE.: NEVER TRUE

## 2025-02-04 SDOH — ECONOMIC STABILITY: FOOD INSECURITY: WITHIN THE PAST 12 MONTHS, YOU WORRIED THAT YOUR FOOD WOULD RUN OUT BEFORE YOU GOT MONEY TO BUY MORE.: NEVER TRUE

## 2025-02-04 ASSESSMENT — PATIENT HEALTH QUESTIONNAIRE - PHQ9
SUM OF ALL RESPONSES TO PHQ QUESTIONS 1-9: 0
SUM OF ALL RESPONSES TO PHQ9 QUESTIONS 1 & 2: 0
SUM OF ALL RESPONSES TO PHQ QUESTIONS 1-9: 0
1. LITTLE INTEREST OR PLEASURE IN DOING THINGS: NOT AT ALL
2. FEELING DOWN, DEPRESSED OR HOPELESS: NOT AT ALL

## 2025-02-04 ASSESSMENT — ANXIETY QUESTIONNAIRES
4. TROUBLE RELAXING: NOT AT ALL
1. FEELING NERVOUS, ANXIOUS, OR ON EDGE: NOT AT ALL
7. FEELING AFRAID AS IF SOMETHING AWFUL MIGHT HAPPEN: NOT AT ALL
3. WORRYING TOO MUCH ABOUT DIFFERENT THINGS: NOT AT ALL
5. BEING SO RESTLESS THAT IT IS HARD TO SIT STILL: NOT AT ALL
6. BECOMING EASILY ANNOYED OR IRRITABLE: NOT AT ALL
GAD7 TOTAL SCORE: 0
2. NOT BEING ABLE TO STOP OR CONTROL WORRYING: NOT AT ALL
IF YOU CHECKED OFF ANY PROBLEMS ON THIS QUESTIONNAIRE, HOW DIFFICULT HAVE THESE PROBLEMS MADE IT FOR YOU TO DO YOUR WORK, TAKE CARE OF THINGS AT HOME, OR GET ALONG WITH OTHER PEOPLE: NOT DIFFICULT AT ALL

## 2025-02-04 ASSESSMENT — ENCOUNTER SYMPTOMS: SHORTNESS OF BREATH: 0

## 2025-02-04 NOTE — PROGRESS NOTES
[facility]  Simone Velásquez M.D.  Internal Medicine  [address] [place] [id number]  Office : [phone]  Fax : [phone]    Chief Complaint   Patient presents with    Diabetes     4 mo follow up       History of Present Illness:  Gume Guillen Jr. is a 54 y.o. male.  HPI    Diabetes Mellitus  Patient presents  for follow up on diabetes.  Onset of symptoms was several years ago. Patient describes symptoms as none. Course to date has been well controlled.Diet and Lifestyle: follows a diabetic diet regularly  exercises sporadically  nonsmoker  Home glucose monitoring:  Results average 140. Patient denies polyuria and polydipsia. No wounds in lower limbs.  Most recent HbA1c was   Hemoglobin A1C   Date Value Ref Range Status   09/23/2024 7.9 (H) 0 - 5.6 % Final     Comment:     Reference Range  Normal       <5.7%  Prediabetes  5.7-6.4%  Diabetes     >6.4%         Hypertension  Patient is in for Hypertension which is stable.    Diet and Lifestyle: generally follows a low sodium diet  Home BP Monitoring: is not measured at home. Patient's recent blood pressures were   BP Readings from Last 3 Encounters:   02/04/25 119/88   01/07/25 120/86   01/05/25 (!) 142/97   .   taking medications as instructed, no medication side effects noted, no TIA's, no chest pain on exertion, no dyspnea on exertion, no swelling of ankles. Cardiac risk factors consist of diabetes mellitus, dyslipidemia, hypertension, and male gender.  Lab Results   Component Value Date/Time     01/05/2025 11:38 AM    K 3.6 01/05/2025 11:38 AM     01/05/2025 11:38 AM    CO2 21 01/05/2025 11:38 AM    BUN 11 01/05/2025 11:38 AM    CREATININE 1.01 01/05/2025 11:38 AM    GLUCOSE 120 01/05/2025 11:38 AM    CALCIUM 9.2 01/05/2025 11:38 AM    LABGLOM 89 01/05/2025 11:38 AM    LABGLOM >60 01/20/2024 08:38 AM        Hyperlipidemia  Patient is in for follow-up for hyperlipidemia.  Diet and

## 2025-04-02 ENCOUNTER — TELEPHONE (OUTPATIENT)
Age: 55
End: 2025-04-02

## 2025-04-02 NOTE — TELEPHONE ENCOUNTER
The pt has dropped off paperwork that he needs filled out stating that he is disabled-trying to get [employer]ouchers for his children to go to summer camp.

## 2025-04-02 NOTE — TELEPHONE ENCOUNTER
Forms reviewed by Dr Lorenzo. He states patient is not disabled from a cardiac standpoint and has no cardiac restrictions or limitations. Therefore, he can't sign the form stating he is disabled.     I notified the patient. I advised him to contact his PCP regarding Disability,or Social Security.     Forms placed at the  for the patient to .

## 2025-04-09 ENCOUNTER — TELEPHONE (OUTPATIENT)
Dept: INTERNAL MEDICINE CLINIC | Facility: CLINIC | Age: 55
End: 2025-04-09

## 2025-04-09 NOTE — TELEPHONE ENCOUNTER
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  Dr Velásquez completed form for him last year, but there is not a copy in the chart.  Mr Guillen tried to get Dr Lorenzo to complete the forms, but request was denied.

## 2025-04-09 NOTE — TELEPHONE ENCOUNTER
I called Mr Ross after speaking to Dr Velásquez, he indeed told him that he wouldn't complete the DSS forms because he wasn't the one who determined that Mr Correa was disabled, it was determined by the Social Security office many years ago. If Mr Ross could show a copy of the form that was completed last year, for Dr Velásquez to see, he would look at it and consider completing the forms.  Through conversation I found out that the patient has his Social Security determination letter, maybe that along with the forms may help.  Forms are so his grandchildren can get [employer] vouchers to attend day camp during the summer.  Mr Guillen will drop by the forms and copy of those filled out last year and Soc Sec determination letter.

## 2025-04-27 DIAGNOSIS — I10 ESSENTIAL HYPERTENSION: ICD-10-CM

## 2025-04-28 RX ORDER — CARVEDILOL 25 MG/1
25 TABLET ORAL 2 TIMES DAILY
Qty: 180 TABLET | Refills: 1 | OUTPATIENT
Start: 2025-04-28

## 2025-06-04 ENCOUNTER — OFFICE VISIT (OUTPATIENT)
Dept: INTERNAL MEDICINE CLINIC | Facility: CLINIC | Age: 55
End: 2025-06-04
Payer: MEDICARE

## 2025-06-04 VITALS
WEIGHT: 230 LBS | BODY MASS INDEX: 28.6 KG/M2 | OXYGEN SATURATION: 97 % | TEMPERATURE: 97.9 F | HEART RATE: 82 BPM | DIASTOLIC BLOOD PRESSURE: 84 MMHG | SYSTOLIC BLOOD PRESSURE: 125 MMHG | HEIGHT: 75 IN

## 2025-06-04 DIAGNOSIS — E11.59 TYPE 2 DIABETES MELLITUS WITH OTHER CIRCULATORY COMPLICATIONS (HCC): ICD-10-CM

## 2025-06-04 DIAGNOSIS — G47.33 OSA (OBSTRUCTIVE SLEEP APNEA): ICD-10-CM

## 2025-06-04 DIAGNOSIS — E78.49 OTHER HYPERLIPIDEMIA: Primary | ICD-10-CM

## 2025-06-04 DIAGNOSIS — E78.49 OTHER HYPERLIPIDEMIA: ICD-10-CM

## 2025-06-04 DIAGNOSIS — I10 ESSENTIAL HYPERTENSION: ICD-10-CM

## 2025-06-04 DIAGNOSIS — Z00.00 MEDICARE ANNUAL WELLNESS VISIT, SUBSEQUENT: ICD-10-CM

## 2025-06-04 DIAGNOSIS — I25.10 ASHD (ARTERIOSCLEROTIC HEART DISEASE): ICD-10-CM

## 2025-06-04 LAB
ALBUMIN SERPL-MCNC: 3.7 G/DL (ref 3.5–5)
ALBUMIN/GLOB SERPL: 1.1 (ref 1–1.9)
ALP SERPL-CCNC: 115 U/L (ref 40–129)
ALT SERPL-CCNC: 14 U/L (ref 8–55)
ANION GAP SERPL CALC-SCNC: 11 MMOL/L (ref 7–16)
AST SERPL-CCNC: 20 U/L (ref 15–37)
BASOPHILS # BLD: 0.05 K/UL (ref 0–0.2)
BASOPHILS NFR BLD: 0.8 % (ref 0–2)
BILIRUB DIRECT SERPL-MCNC: 0.3 MG/DL (ref 0–0.3)
BILIRUB SERPL-MCNC: 0.6 MG/DL (ref 0–1.2)
BUN SERPL-MCNC: 11 MG/DL (ref 6–23)
CALCIUM SERPL-MCNC: 9.2 MG/DL (ref 8.8–10.2)
CHLORIDE SERPL-SCNC: 106 MMOL/L (ref 98–107)
CHOLEST SERPL-MCNC: 95 MG/DL (ref 0–200)
CO2 SERPL-SCNC: 23 MMOL/L (ref 20–29)
CREAT SERPL-MCNC: 1.03 MG/DL (ref 0.8–1.3)
DIFFERENTIAL METHOD BLD: ABNORMAL
EOSINOPHIL # BLD: 0.19 K/UL (ref 0–0.8)
EOSINOPHIL NFR BLD: 2.9 % (ref 0.5–7.8)
ERYTHROCYTE [DISTWIDTH] IN BLOOD BY AUTOMATED COUNT: 13.2 % (ref 11.9–14.6)
EST. AVERAGE GLUCOSE BLD GHB EST-MCNC: 154 MG/DL
GLOBULIN SER CALC-MCNC: 3.5 G/DL (ref 2.3–3.5)
GLUCOSE SERPL-MCNC: 125 MG/DL (ref 70–99)
HBA1C MFR BLD: 7 % (ref 0–5.6)
HCT VFR BLD AUTO: 49.4 % (ref 41.1–50.3)
HDLC SERPL-MCNC: 37 MG/DL (ref 40–60)
HDLC SERPL: 2.6 (ref 0–5)
HGB BLD-MCNC: 15.9 G/DL (ref 13.6–17.2)
IMM GRANULOCYTES # BLD AUTO: 0.01 K/UL (ref 0–0.5)
IMM GRANULOCYTES NFR BLD AUTO: 0.2 % (ref 0–5)
LDLC SERPL CALC-MCNC: 47 MG/DL (ref 0–100)
LYMPHOCYTES # BLD: 1.72 K/UL (ref 0.5–4.6)
LYMPHOCYTES NFR BLD: 26.5 % (ref 13–44)
MCH RBC QN AUTO: 30.2 PG (ref 26.1–32.9)
MCHC RBC AUTO-ENTMCNC: 32.2 G/DL (ref 31.4–35)
MCV RBC AUTO: 93.9 FL (ref 82–102)
MONOCYTES # BLD: 0.48 K/UL (ref 0.1–1.3)
MONOCYTES NFR BLD: 7.4 % (ref 4–12)
NEUTS SEG # BLD: 4.05 K/UL (ref 1.7–8.2)
NEUTS SEG NFR BLD: 62.2 % (ref 43–78)
NRBC # BLD: 0 K/UL (ref 0–0.2)
PLATELET # BLD AUTO: 179 K/UL (ref 150–450)
PMV BLD AUTO: 9.3 FL (ref 9.4–12.3)
POTASSIUM SERPL-SCNC: 3.8 MMOL/L (ref 3.5–5.1)
PROT SERPL-MCNC: 7.2 G/DL (ref 6.3–8.2)
RBC # BLD AUTO: 5.26 M/UL (ref 4.23–5.6)
SODIUM SERPL-SCNC: 139 MMOL/L (ref 136–145)
TRIGL SERPL-MCNC: 52 MG/DL (ref 0–150)
VLDLC SERPL CALC-MCNC: 10 MG/DL (ref 6–23)
WBC # BLD AUTO: 6.5 K/UL (ref 4.3–11.1)

## 2025-06-04 PROCEDURE — 99214 OFFICE O/P EST MOD 30 MIN: CPT | Performed by: INTERNAL MEDICINE

## 2025-06-04 PROCEDURE — G2211 COMPLEX E/M VISIT ADD ON: HCPCS | Performed by: INTERNAL MEDICINE

## 2025-06-04 PROCEDURE — 3074F SYST BP LT 130 MM HG: CPT | Performed by: INTERNAL MEDICINE

## 2025-06-04 PROCEDURE — 3079F DIAST BP 80-89 MM HG: CPT | Performed by: INTERNAL MEDICINE

## 2025-06-04 PROCEDURE — G0439 PPPS, SUBSEQ VISIT: HCPCS | Performed by: INTERNAL MEDICINE

## 2025-06-04 RX ORDER — ATORVASTATIN CALCIUM 80 MG/1
TABLET, FILM COATED ORAL
Qty: 90 TABLET | Refills: 1 | Status: SHIPPED | OUTPATIENT
Start: 2025-06-04

## 2025-06-04 RX ORDER — SITAGLIPTIN AND METFORMIN HYDROCHLORIDE 1000; 100 MG/1; MG/1
1 TABLET, FILM COATED, EXTENDED RELEASE ORAL DAILY
Qty: 90 TABLET | Refills: 1 | Status: SHIPPED | OUTPATIENT
Start: 2025-06-04

## 2025-06-04 RX ORDER — CARVEDILOL 25 MG/1
TABLET ORAL
Qty: 180 TABLET | Refills: 1 | Status: SHIPPED | OUTPATIENT
Start: 2025-06-04

## 2025-06-04 SDOH — ECONOMIC STABILITY: FOOD INSECURITY: WITHIN THE PAST 12 MONTHS, THE FOOD YOU BOUGHT JUST DIDN'T LAST AND YOU DIDN'T HAVE MONEY TO GET MORE.: NEVER TRUE

## 2025-06-04 SDOH — ECONOMIC STABILITY: FOOD INSECURITY: WITHIN THE PAST 12 MONTHS, YOU WORRIED THAT YOUR FOOD WOULD RUN OUT BEFORE YOU GOT MONEY TO BUY MORE.: NEVER TRUE

## 2025-06-04 ASSESSMENT — PATIENT HEALTH QUESTIONNAIRE - PHQ9
1. LITTLE INTEREST OR PLEASURE IN DOING THINGS: NOT AT ALL
SUM OF ALL RESPONSES TO PHQ QUESTIONS 1-9: 0
2. FEELING DOWN, DEPRESSED OR HOPELESS: NOT AT ALL
SUM OF ALL RESPONSES TO PHQ QUESTIONS 1-9: 0

## 2025-06-04 ASSESSMENT — ENCOUNTER SYMPTOMS: SHORTNESS OF BREATH: 0

## 2025-06-04 NOTE — PROGRESS NOTES
[facility]  Simone Velásquez M.D.  Internal Medicine  [address] [place] [address]  Office : [phone]  Fax : [phone]    CHIEF COMPLAINT  Chief Complaint   Patient presents with    Diabetes     4 mo follow up    Medicare AWV     Sub AWV         Medicare Annual Wellness Visit    Gume Guillen Jr. is here for Diabetes (4 mo follow up) and Medicare AWV (Sub AWV)    Assessment & Plan   Other hyperlipidemia  The following orders have not been finalized:  -     atorvastatin (LIPITOR) 80 MG tablet  Essential hypertension  The following orders have not been finalized:  -     carvedilol (COREG) 25 MG tablet  Type 2 diabetes mellitus with other circulatory complications (HCC)  The following orders have not been finalized:  -     SITaglip Phos-metFORMIN HCl ER (JANUMET XR) 100-1000 MG TB24  -     empagliflozin (JARDIANCE) 25 MG tablet  Medicare annual wellness visit, subsequent       No follow-ups on file.     Subjective       Patient's complete Health Risk Assessment and screening values have been reviewed and are found in Flowsheets. The following problems were reviewed today and where indicated follow up appointments were made and/or referrals ordered.    Positive Risk Factor Screenings with Interventions:                 Dentist Screen:  Have you seen the dentist within the past year?: (!) No    Intervention:  Advised to schedule with their dentist     Vision Screen:  Do you have difficulty driving, watching TV, or doing any of your daily activities because of your eyesight?: No  Have you had an eye exam within the past year?: (!) No  Interventions:   Patient encouraged to make appointment with their eye specialist            Mini-Cog cognitive screening and clock test were performed and patient answered 3/3 words correctly, and dylan clock correctly          Objective   Vitals:    06/04/25 0825   TempSrc: Temporal   Weight: 104.3 kg (230 lb)   Height:

## 2025-06-04 NOTE — PROGRESS NOTES
[facility]  Simone Velásquez M.D.  Internal Medicine  [address] [place] [id number]  Office : [phone]  Fax : [phone]    Chief Complaint   Patient presents with    Diabetes     4 mo follow up    Medicare AWV     Sub AW       History of Present Illness:  Gume Guillen Jr. is a 54 y.o. male.  HPI    Diabetes Mellitus  Patient presents  for follow up on diabetes.  Onset of symptoms was several years ago. Patient describes symptoms as none. Course to date has been waxing and waning.Diet and Lifestyle: follows a diabetic diet regularly  exercises sporadically  nonsmoker  Home glucose monitoring:  Results average n/a. Patient denies polyuria and polydipsia. No wounds in lower limbs.  Most recent HbA1c was   Hemoglobin A1C   Date Value Ref Range Status   09/23/2024 7.9 (H) 0 - 5.6 % Final     Comment:     Reference Range  Normal       <5.7%  Prediabetes  5.7-6.4%  Diabetes     >6.4%         Hypertension  Patient is in for Hypertension which is stable.    Diet and Lifestyle: generally follows a low sodium diet  Home BP Monitoring: is not measured at home. Patient's recent blood pressures were   BP Readings from Last 3 Encounters:   06/04/25 125/84   04/01/25 127/88   02/04/25 119/88   .   taking medications as instructed, no medication side effects noted, no TIA's, no chest pain on exertion, no dyspnea on exertion, no swelling of ankles. Cardiac risk factors consist of diabetes mellitus, dyslipidemia, hypertension, and male gender.  Lab Results   Component Value Date/Time     01/05/2025 11:38 AM    K 3.6 01/05/2025 11:38 AM     01/05/2025 11:38 AM    CO2 21 01/05/2025 11:38 AM    BUN 11 01/05/2025 11:38 AM    CREATININE 1.01 01/05/2025 11:38 AM    GLUCOSE 120 01/05/2025 11:38 AM    CALCIUM 9.2 01/05/2025 11:38 AM    LABGLOM 89 01/05/2025 11:38 AM    LABGLOM >60 01/20/2024 08:38 AM        Hyperlipidemia  Patient is in for follow-up for

## 2025-06-05 ENCOUNTER — RESULTS FOLLOW-UP (OUTPATIENT)
Dept: INTERNAL MEDICINE CLINIC | Facility: CLINIC | Age: 55
End: 2025-06-05

## 2025-06-10 ENCOUNTER — OFFICE VISIT (OUTPATIENT)
Dept: INTERNAL MEDICINE CLINIC | Facility: CLINIC | Age: 55
End: 2025-06-10
Payer: MEDICARE

## 2025-06-10 VITALS
HEIGHT: 75 IN | DIASTOLIC BLOOD PRESSURE: 86 MMHG | TEMPERATURE: 98.1 F | OXYGEN SATURATION: 98 % | HEART RATE: 80 BPM | SYSTOLIC BLOOD PRESSURE: 125 MMHG | WEIGHT: 229 LBS | BODY MASS INDEX: 28.47 KG/M2

## 2025-06-10 DIAGNOSIS — S16.1XXA STRAIN OF NECK MUSCLE, INITIAL ENCOUNTER: Primary | ICD-10-CM

## 2025-06-10 DIAGNOSIS — M79.5 RETAINED BULLET: ICD-10-CM

## 2025-06-10 PROCEDURE — 3079F DIAST BP 80-89 MM HG: CPT | Performed by: INTERNAL MEDICINE

## 2025-06-10 PROCEDURE — 99213 OFFICE O/P EST LOW 20 MIN: CPT | Performed by: INTERNAL MEDICINE

## 2025-06-10 PROCEDURE — 3074F SYST BP LT 130 MM HG: CPT | Performed by: INTERNAL MEDICINE

## 2025-06-10 RX ORDER — BACLOFEN 10 MG/1
10 TABLET ORAL 3 TIMES DAILY
Qty: 30 TABLET | Refills: 0 | Status: SHIPPED | OUTPATIENT
Start: 2025-06-10

## 2025-06-10 SDOH — ECONOMIC STABILITY: FOOD INSECURITY: WITHIN THE PAST 12 MONTHS, YOU WORRIED THAT YOUR FOOD WOULD RUN OUT BEFORE YOU GOT MONEY TO BUY MORE.: NEVER TRUE

## 2025-06-10 SDOH — ECONOMIC STABILITY: FOOD INSECURITY: WITHIN THE PAST 12 MONTHS, THE FOOD YOU BOUGHT JUST DIDN'T LAST AND YOU DIDN'T HAVE MONEY TO GET MORE.: NEVER TRUE

## 2025-06-10 ASSESSMENT — PATIENT HEALTH QUESTIONNAIRE - PHQ9
SUM OF ALL RESPONSES TO PHQ QUESTIONS 1-9: 0
1. LITTLE INTEREST OR PLEASURE IN DOING THINGS: NOT AT ALL
SUM OF ALL RESPONSES TO PHQ QUESTIONS 1-9: 0
SUM OF ALL RESPONSES TO PHQ QUESTIONS 1-9: 0
2. FEELING DOWN, DEPRESSED OR HOPELESS: NOT AT ALL
SUM OF ALL RESPONSES TO PHQ QUESTIONS 1-9: 0
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
## 2025-06-26 ENCOUNTER — PREP FOR PROCEDURE (OUTPATIENT)
Dept: SURGERY | Age: 55
End: 2025-06-26

## 2025-06-26 ENCOUNTER — OFFICE VISIT (OUTPATIENT)
Dept: SURGERY | Age: 55
End: 2025-06-26
Payer: MEDICARE

## 2025-06-26 VITALS
HEART RATE: 87 BPM | BODY MASS INDEX: 28.47 KG/M2 | HEIGHT: 75 IN | WEIGHT: 229 LBS | DIASTOLIC BLOOD PRESSURE: 88 MMHG | OXYGEN SATURATION: 98 % | SYSTOLIC BLOOD PRESSURE: 114 MMHG

## 2025-06-26 DIAGNOSIS — S40.851A SUPERFICIAL FOREIGN BODY OF RIGHT UPPER ARM, INITIAL ENCOUNTER: Primary | ICD-10-CM

## 2025-06-26 DIAGNOSIS — M79.5 FOREIGN BODY (FB) IN SOFT TISSUE: ICD-10-CM

## 2025-06-26 PROCEDURE — 99204 OFFICE O/P NEW MOD 45 MIN: CPT | Performed by: SURGERY

## 2025-06-26 PROCEDURE — 3074F SYST BP LT 130 MM HG: CPT | Performed by: SURGERY

## 2025-06-26 PROCEDURE — 3079F DIAST BP 80-89 MM HG: CPT | Performed by: SURGERY
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
## 2025-06-27 ENCOUNTER — TELEPHONE (OUTPATIENT)
Age: 55
End: 2025-06-27

## 2025-06-27 NOTE — TELEPHONE ENCOUNTER
I called pt.and he said surgery in in August.He has an appt.in July.Note made in appt.notes that cardiac clearance is needed.

## 2025-06-27 NOTE — TELEPHONE ENCOUNTER
Cardiac Clearance        Physician or Practice Requesting:[facility]   : Dr. Saman Puga  Contact Phone Number: [phone]  Fax Number: place in epic  Date of Surgery/Procedure: TBD  Type of Surgery or Procedure: Excision foreign body of right arm    Type of Anesthesia:   Type of Clearance Requested: risk assessment and any medication holf   Medication to Hold:Xarelto   Days to Hold: please advise    69.9

## 2025-06-27 NOTE — TELEPHONE ENCOUNTER
Cardiac Clearance           Physician or Practice Requesting:[facility]   : Dr. Saman Puga  Contact Phone Number: [phone]  Fax Number: place in epic  Date of Surgery/Procedure: TBD  Type of Surgery or Procedure: Excision foreign body of right arm    Type of Anesthesia: MAC  Type of Clearance Requested: risk assessment and any medication hold  Medication to Hold:Xarelto   Days to Hold: please advise

## 2025-07-02 ENCOUNTER — HOSPITAL ENCOUNTER (EMERGENCY)
Age: 55
Discharge: HOME | End: 2025-07-02
Attending: EMERGENCY MEDICINE
Payer: MEDICARE

## 2025-07-02 VITALS
RESPIRATION RATE: 18 BRPM | DIASTOLIC BLOOD PRESSURE: 95 MMHG | TEMPERATURE: 98 F | BODY MASS INDEX: 28.6 KG/M2 | OXYGEN SATURATION: 99 % | SYSTOLIC BLOOD PRESSURE: 144 MMHG | WEIGHT: 230 LBS | HEIGHT: 75 IN | HEART RATE: 80 BPM

## 2025-07-02 DIAGNOSIS — R00.2 PALPITATIONS: Primary | ICD-10-CM

## 2025-07-02 DIAGNOSIS — I49.3 PVC (PREMATURE VENTRICULAR CONTRACTION): ICD-10-CM

## 2025-07-02 LAB
ALBUMIN SERPL-MCNC: 3.6 G/DL (ref 3.5–5)
ALBUMIN/GLOB SERPL: 1 (ref 1–1.9)
ALP SERPL-CCNC: 112 U/L (ref 40–129)
ALT SERPL-CCNC: 14 U/L (ref 8–55)
ANION GAP SERPL CALC-SCNC: 12 MMOL/L (ref 7–16)
AST SERPL-CCNC: 20 U/L (ref 15–37)
BASOPHILS # BLD: 0.05 K/UL (ref 0–0.2)
BASOPHILS NFR BLD: 0.7 % (ref 0–2)
BILIRUB SERPL-MCNC: 0.7 MG/DL (ref 0–1.2)
BUN SERPL-MCNC: 9 MG/DL (ref 6–23)
CALCIUM SERPL-MCNC: 9.1 MG/DL (ref 8.8–10.2)
CHLORIDE SERPL-SCNC: 109 MMOL/L (ref 98–107)
CO2 SERPL-SCNC: 20 MMOL/L (ref 20–29)
CREAT SERPL-MCNC: 0.94 MG/DL (ref 0.8–1.3)
DIFFERENTIAL METHOD BLD: ABNORMAL
EKG ATRIAL RATE: 86 BPM
EKG DIAGNOSIS: NORMAL
EKG P AXIS: 58 DEGREES
EKG P-R INTERVAL: 188 MS
EKG Q-T INTERVAL: 370 MS
EKG QRS DURATION: 98 MS
EKG QTC CALCULATION (BAZETT): 442 MS
EKG R AXIS: -9 DEGREES
EKG T AXIS: -15 DEGREES
EKG VENTRICULAR RATE: 86 BPM
EOSINOPHIL # BLD: 0.23 K/UL (ref 0–0.8)
EOSINOPHIL NFR BLD: 3.4 % (ref 0.5–7.8)
ERYTHROCYTE [DISTWIDTH] IN BLOOD BY AUTOMATED COUNT: 13.2 % (ref 11.9–14.6)
GLOBULIN SER CALC-MCNC: 3.5 G/DL (ref 2.3–3.5)
GLUCOSE SERPL-MCNC: 136 MG/DL (ref 70–99)
HCT VFR BLD AUTO: 49 % (ref 41.1–50.3)
HGB BLD-MCNC: 15.7 G/DL (ref 13.6–17.2)
IMM GRANULOCYTES # BLD AUTO: 0.02 K/UL (ref 0–0.5)
IMM GRANULOCYTES NFR BLD AUTO: 0.3 % (ref 0–5)
LYMPHOCYTES # BLD: 2.09 K/UL (ref 0.5–4.6)
LYMPHOCYTES NFR BLD: 30.6 % (ref 13–44)
MAGNESIUM SERPL-MCNC: 2 MG/DL (ref 1.8–2.4)
MCH RBC QN AUTO: 29.8 PG (ref 26.1–32.9)
MCHC RBC AUTO-ENTMCNC: 32 G/DL (ref 31.4–35)
MCV RBC AUTO: 93 FL (ref 82–102)
MONOCYTES # BLD: 0.42 K/UL (ref 0.1–1.3)
MONOCYTES NFR BLD: 6.2 % (ref 4–12)
NEUTS SEG # BLD: 4.01 K/UL (ref 1.7–8.2)
NEUTS SEG NFR BLD: 58.8 % (ref 43–78)
NRBC # BLD: 0 K/UL (ref 0–0.2)
NT PRO BNP: 138 PG/ML (ref 0–125)
PLATELET # BLD AUTO: 186 K/UL (ref 150–450)
PMV BLD AUTO: 9 FL (ref 9.4–12.3)
POTASSIUM SERPL-SCNC: 3.5 MMOL/L (ref 3.5–5.1)
PROT SERPL-MCNC: 7.1 G/DL (ref 6.3–8.2)
RBC # BLD AUTO: 5.27 M/UL (ref 4.23–5.6)
SODIUM SERPL-SCNC: 141 MMOL/L (ref 136–145)
TROPONIN T SERPL HS-MCNC: 10.4 NG/L (ref 0–22)
WBC # BLD AUTO: 6.8 K/UL (ref 4.3–11.1)

## 2025-07-02 PROCEDURE — 93005 ELECTROCARDIOGRAM TRACING: CPT | Performed by: EMERGENCY MEDICINE

## 2025-07-02 PROCEDURE — 85025 COMPLETE CBC W/AUTO DIFF WBC: CPT

## 2025-07-02 PROCEDURE — 84484 ASSAY OF TROPONIN QUANT: CPT

## 2025-07-02 PROCEDURE — 99284 EMERGENCY DEPT VISIT MOD MDM: CPT

## 2025-07-02 PROCEDURE — 83735 ASSAY OF MAGNESIUM: CPT

## 2025-07-02 PROCEDURE — 80053 COMPREHEN METABOLIC PANEL: CPT

## 2025-07-02 PROCEDURE — 93010 ELECTROCARDIOGRAM REPORT: CPT | Performed by: INTERNAL MEDICINE

## 2025-07-02 PROCEDURE — 83880 ASSAY OF NATRIURETIC PEPTIDE: CPT

## 2025-07-02 RX ORDER — POTASSIUM CHLORIDE 1500 MG/1
20 TABLET, EXTENDED RELEASE ORAL DAILY
Qty: 5 TABLET | Refills: 0 | Status: SHIPPED | OUTPATIENT
Start: 2025-07-02 | End: 2025-07-07

## 2025-07-02 ASSESSMENT — PAIN - FUNCTIONAL ASSESSMENT: PAIN_FUNCTIONAL_ASSESSMENT: NONE - DENIES PAIN

## 2025-07-02 NOTE — DISCHARGE INSTRUCTIONS
We recommend you follow-up with your cardiologist as previously scheduled next week, you may discuss whether or not it may be advisable to increase your carvedilol (Coreg) dose, they may also consider an event monitor study or heart rate monitor to monitor your heart rate prior to making any medication changes.    We are prescribing potassium once daily to be taken with food/drink x 5 days as your potassium is borderline low and this could potentially be contributing to your symptoms, it is not felt to be emergently low.    Please return for any questions, concerns or worsening symptoms, particularly increasing/unremitting chest pain, fainting episodes, heart palpitations, lightheadedness, shortness of breath.

## 2025-07-02 NOTE — ED PROVIDER NOTES
Emergency Department Provider Note                   PCP:                Simone Velásquez MD               Age: 54 y.o.      Sex: male   Final diagnosis/impression:  1. Palpitations    2. PVC (premature ventricular contraction)       Disposition: Discharged    MDM/Clinical Course:  Patient seen by myself at the [facility] emergency department. Patient had signs symptoms and clinical history most consistent with palpitation complaint as previously discussed. My independent analysis/interpretation of laboratory work-up here shows no acute/emergent normality, potassium borderline low at 3.5 so will write short course of outpatient prescription for oral potassium supplementation.  Discussed with patient PVC as well as pauses associated with PVCs.  Overall this could be the issue though I recommended follow-up with cardiologist as previously scheduled on 7/8/2025 for further discussion, consideration of possible event monitor study.  I recommended close monitoring of symptoms, low threshold to return as needed.  Patient/family given instructions to return as needed for any questions, concerns or worsening symptoms, particularly those as outlined in the disposition section / discharge section of patient discharge paperwork. Patient/family verbalizes understanding and agreement with ED course/plan in shared medical decision making. Questions answered.    Complexity of Problems Addressed:  1 or more acute illnesses that pose a threat to life or bodily function.     Data Reviewed and Analyzed:    Category 1:   I reviewed external records: Reviewed 1/7/2025 outpatient cardiology follow-up appointment, this documents history of EF of 45 to 50%, CAD former smoker, Xarelto use  I ordered each unique test.  I reviewed the results of each unique test.    Category 2:   My Independent EKG Interpretation:   Sinus rhythm, rate of 86, no ST elevation MI, QTc is 442 and is not prolonged, QRS is 98 is not 1, overall
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
## 2025-07-08 ENCOUNTER — OFFICE VISIT (OUTPATIENT)
Age: 55
End: 2025-07-08
Payer: MEDICARE

## 2025-07-08 VITALS
WEIGHT: 224 LBS | HEIGHT: 75 IN | SYSTOLIC BLOOD PRESSURE: 118 MMHG | BODY MASS INDEX: 27.85 KG/M2 | DIASTOLIC BLOOD PRESSURE: 80 MMHG | HEART RATE: 77 BPM

## 2025-07-08 DIAGNOSIS — I10 HYPERTENSION, UNSPECIFIED TYPE: ICD-10-CM

## 2025-07-08 DIAGNOSIS — Z01.810 PREOPERATIVE CARDIOVASCULAR EXAMINATION: ICD-10-CM

## 2025-07-08 DIAGNOSIS — I49.3 PVC'S (PREMATURE VENTRICULAR CONTRACTIONS): ICD-10-CM

## 2025-07-08 DIAGNOSIS — E78.5 HYPERLIPIDEMIA, UNSPECIFIED HYPERLIPIDEMIA TYPE: ICD-10-CM

## 2025-07-08 DIAGNOSIS — I25.10 CAD IN NATIVE ARTERY: Primary | ICD-10-CM

## 2025-07-08 PROCEDURE — 99214 OFFICE O/P EST MOD 30 MIN: CPT | Performed by: INTERNAL MEDICINE

## 2025-07-08 PROCEDURE — 93000 ELECTROCARDIOGRAM COMPLETE: CPT | Performed by: INTERNAL MEDICINE

## 2025-07-08 PROCEDURE — 3074F SYST BP LT 130 MM HG: CPT | Performed by: INTERNAL MEDICINE

## 2025-07-08 PROCEDURE — 3079F DIAST BP 80-89 MM HG: CPT | Performed by: INTERNAL MEDICINE

## 2025-08-07 PROBLEM — Z01.810 PREOPERATIVE CARDIOVASCULAR EXAMINATION: Status: RESOLVED | Noted: 2025-07-08 | Resolved: 2025-08-07

## 2025-08-28 ENCOUNTER — OFFICE VISIT (OUTPATIENT)
Dept: SURGERY | Age: 55
End: 2025-08-28

## 2025-08-28 VITALS — HEIGHT: 75 IN | BODY MASS INDEX: 27.85 KG/M2 | WEIGHT: 224 LBS

## 2025-08-28 DIAGNOSIS — S40.851A SUPERFICIAL FOREIGN BODY OF RIGHT UPPER ARM, INITIAL ENCOUNTER: Primary | ICD-10-CM

## (undated) DEVICE — CATHETER DIAG AD 5FR L110CM 145DEG COR GRY HYDRPHLC NYL

## (undated) DEVICE — CATH BLLN ANGIO 2.75X12MM NC EUPHORIA RX

## (undated) DEVICE — COPILOT BLEEDBACK CONTROL VALVE: Brand: COPILOT

## (undated) DEVICE — CATH BLLN ANGIO 2X15MM SC EUPHORA RX

## (undated) DEVICE — DEVICE COMPR LNG 27 CM VASC BND

## (undated) DEVICE — RUNTHROUGH NS EXTRA FLOPPY PTCA GUIDEWIRE: Brand: RUNTHROUGH

## (undated) DEVICE — GUIDE CATHETER: Brand: RUNWAY™

## (undated) DEVICE — HI-TORQUE PILOT 50 GUIDE WIRE .014 J TIP3.0 CM X 300 CM: Brand: HI-TORQUE PILOT

## (undated) DEVICE — HI-TORQUE PILOT 50 GUIDE WIRE .014 J TIP 3.0 CM X 190 CM: Brand: HI-TORQUE PILOT

## (undated) DEVICE — GUIDEWIRE 035IN 210CM PTFE COAT FIX COR J TIP 15MM FIRM BODY

## (undated) DEVICE — CATHETER GUID 6FR L150CM RAP EXCHG L25CM TIP 5.1FR PUSHROD

## (undated) DEVICE — CATHETER COR DIAG 4.0 6FR 110CM 2 SIDE H

## (undated) DEVICE — CATHETER ASPIR 6FR L140CM GWIRE STYL TECHNOLOGY UNIQUE BVL

## (undated) DEVICE — CATHETER GUID AD 6FR L100CM COR PERIPH GRN NYL PTFE AL 1

## (undated) DEVICE — CATHETER GUID JR5 0.070 INX6 FRX100 CM VISTA BRT TIP

## (undated) DEVICE — THROMBECTOMY SET: Brand: ANGIOJET® SPIROFLEX®

## (undated) DEVICE — GLIDESHEATH SLENDER STAINLESS STEEL KIT: Brand: GLIDESHEATH SLENDER